# Patient Record
Sex: MALE | Race: WHITE | NOT HISPANIC OR LATINO | URBAN - METROPOLITAN AREA
[De-identification: names, ages, dates, MRNs, and addresses within clinical notes are randomized per-mention and may not be internally consistent; named-entity substitution may affect disease eponyms.]

---

## 2019-02-26 ENCOUNTER — CLINICAL SUPPORT (OUTPATIENT)
Dept: SMOKING CESSATION | Facility: CLINIC | Age: 59
End: 2019-02-26
Payer: COMMERCIAL

## 2019-02-26 DIAGNOSIS — F17.200 NICOTINE DEPENDENCE: Primary | ICD-10-CM

## 2019-02-26 PROCEDURE — 99404 PR PREVENT COUNSEL,INDIV,60 MIN: ICD-10-PCS | Mod: S$GLB,,, | Performed by: GENERAL PRACTICE

## 2019-02-26 PROCEDURE — 99999 PR PBB SHADOW E&M-NEW PATIENT-LVL I: CPT | Mod: PBBFAC,,,

## 2019-02-26 PROCEDURE — 99999 PR PBB SHADOW E&M-NEW PATIENT-LVL I: ICD-10-PCS | Mod: PBBFAC,,,

## 2019-02-26 PROCEDURE — 99404 PREV MED CNSL INDIV APPRX 60: CPT | Mod: S$GLB,,, | Performed by: GENERAL PRACTICE

## 2019-02-26 RX ORDER — IBUPROFEN 200 MG
1 TABLET ORAL DAILY
Qty: 14 PATCH | Refills: 0 | Status: SHIPPED | OUTPATIENT
Start: 2019-02-26 | End: 2019-03-19 | Stop reason: SDUPTHER

## 2019-02-26 RX ORDER — MULTIVITAMIN
1 TABLET ORAL DAILY
COMMUNITY

## 2019-03-12 ENCOUNTER — CLINICAL SUPPORT (OUTPATIENT)
Dept: SMOKING CESSATION | Facility: CLINIC | Age: 59
End: 2019-03-12
Payer: COMMERCIAL

## 2019-03-12 DIAGNOSIS — F17.200 NICOTINE DEPENDENCE: Primary | ICD-10-CM

## 2019-03-12 PROCEDURE — 99401 PREV MED CNSL INDIV APPRX 15: CPT | Mod: S$GLB,,, | Performed by: GENERAL PRACTICE

## 2019-03-12 PROCEDURE — 99401 PR PREVENT COUNSEL,INDIV,15 MIN: ICD-10-PCS | Mod: S$GLB,,, | Performed by: GENERAL PRACTICE

## 2019-03-12 NOTE — PROGRESS NOTES
Individual Follow-Up Form    3/12/2019    Clinical Status of Patient: Outpatient    Length of Service: 15 minutes    Continuing Medication: yes  Patches     Target Symptoms: Withdrawal and medication side effects. The following were  rated moderate (3) to severe (4) on TCRS:  · Moderate (3): desire tobacco, restless  · Severe (4): none    Comments: Spoke with patient by telephone along with his wife Magnolia in regards to their scheduled clinic follow up appointment. She stated that she did not arrange for transportation for today and will need to reschedule for next week. He stated that he was able to go 4-5 days without smoking last week and used the Step 1 nicotine patch with no adverse side effects noted. He stated that he and his wife were feeling stressed due to smoke personal issues and their finances so they each smoked. He stated that he woke up this morning and put on a nicotine patch. He has 8 remaining patches and feels confident that he will be able to refrain from smoking. Discussed coping strategies and healthy eating habits. Encouraged a clinic follow up appointment. Appointment scheduled. The patient denies any abnormal behavioral or mental changes at this time. Will continue to encourage and monitor progress.    Diagnosis: F17.200    Next Visit: 1 week

## 2019-03-19 ENCOUNTER — CLINICAL SUPPORT (OUTPATIENT)
Dept: SMOKING CESSATION | Facility: CLINIC | Age: 59
End: 2019-03-19
Payer: COMMERCIAL

## 2019-03-19 DIAGNOSIS — F17.200 NICOTINE DEPENDENCE: Primary | ICD-10-CM

## 2019-03-19 DIAGNOSIS — F17.200 NICOTINE DEPENDENCE: ICD-10-CM

## 2019-03-19 PROCEDURE — 99999 PR PBB SHADOW E&M-EST. PATIENT-LVL I: CPT | Mod: PBBFAC,,,

## 2019-03-19 PROCEDURE — 99999 PR PBB SHADOW E&M-EST. PATIENT-LVL I: ICD-10-PCS | Mod: PBBFAC,,,

## 2019-03-19 PROCEDURE — 99404 PREV MED CNSL INDIV APPRX 60: CPT | Mod: S$GLB,,, | Performed by: GENERAL PRACTICE

## 2019-03-19 PROCEDURE — 99404 PR PREVENT COUNSEL,INDIV,60 MIN: ICD-10-PCS | Mod: S$GLB,,, | Performed by: GENERAL PRACTICE

## 2019-03-19 RX ORDER — IBUPROFEN 200 MG
1 TABLET ORAL DAILY
Qty: 14 PATCH | Refills: 0 | Status: SHIPPED | OUTPATIENT
Start: 2019-03-19 | End: 2019-09-18 | Stop reason: ALTCHOICE

## 2019-03-19 RX ORDER — DM/P-EPHED/ACETAMINOPH/DOXYLAM 30-7.5/3
2 LIQUID (ML) ORAL
Qty: 72 LOZENGE | Refills: 2 | Status: SHIPPED | OUTPATIENT
Start: 2019-03-19 | End: 2019-04-25 | Stop reason: SDUPTHER

## 2019-03-19 NOTE — PROGRESS NOTES
Individual Follow-Up Form    3/19/2019    Clinical Status of Patient: Outpatient    Length of Service: 60 minutes    Continuing Medication: yes  Patches    Other Medications: nicotine lozenges to use as needed     Target Symptoms: Withdrawal and medication side effects. The following were  rated moderate (3) to severe (4) on TCRS:  · Moderate (3): desire tobacco  · Severe (4): none    Comments: Patient was seen today for a smoking cessation progress update. He is here today with his wife whom is also in the program. He states that he is doing better than his wife. He is able to refrain from smoking but states that when he sees his wife smoke he will have an intense craving that he is unable to overcome. Discussed coping strategies and the use of nicotine lozenges. He verbalized understanding.  He denies any negative thoughts or behavior at this time. Discussed healthy eating habits and physical activities. Will continue to encourage and monitor progress.  Diagnosis: F17.200    Next Visit: 2 weeks

## 2019-04-02 ENCOUNTER — CLINICAL SUPPORT (OUTPATIENT)
Dept: SMOKING CESSATION | Facility: CLINIC | Age: 59
End: 2019-04-02
Payer: COMMERCIAL

## 2019-04-02 DIAGNOSIS — F17.200 NICOTINE DEPENDENCE: Primary | ICD-10-CM

## 2019-04-02 PROCEDURE — 99401 PR PREVENT COUNSEL,INDIV,15 MIN: ICD-10-PCS | Mod: S$GLB,,, | Performed by: GENERAL PRACTICE

## 2019-04-02 PROCEDURE — 99401 PREV MED CNSL INDIV APPRX 15: CPT | Mod: S$GLB,,, | Performed by: GENERAL PRACTICE

## 2019-04-02 NOTE — PROGRESS NOTES
"Individual Follow-Up Form    4/2/2019    Quit Date:3-    Clinical Status of Patient: Outpatient    Length of Service: 15 minutes    Continuing Medication: yes  Nicotine Lozenges     Target Symptoms: Withdrawal and medication side effects. The following were  rated moderate (3) to severe (4) on TCRS:  · Moderate (3): restless, anxious  · Severe (4): none    Comments: Spoke with patient by telephone in regards to his smoking cessation progress. He and his wife were on the phone together for a counseling call by telephone. She stated that they were unable to secure transportation for today's appointment. Both have reached their target quit date. He stated that he had an easier time not smoking because he would keep himself busy or go for a walk when he wanted to smoke. He is using the nicotine lozenges and states that he is only using a "few" each day. He has stopped using the nicotine patches and feels confident that he will remain tobacco free. They both state that they still have remaining cigarettes in the house if it gets too difficult. Reviewed coping strategies and encouraged them to delay before giving in and to get rid of the remaining cigarettes. They verbalized understanding. The patient denies any abnormal behavioral or mental changes at this time. Will continue to encourage and monitor progress.    Diagnosis: F17.200    Next Visit: 2 weeks  "

## 2019-04-25 ENCOUNTER — CLINICAL SUPPORT (OUTPATIENT)
Dept: SMOKING CESSATION | Facility: CLINIC | Age: 59
End: 2019-04-25
Payer: COMMERCIAL

## 2019-04-25 ENCOUNTER — TELEPHONE (OUTPATIENT)
Dept: SMOKING CESSATION | Facility: CLINIC | Age: 59
End: 2019-04-25

## 2019-04-25 DIAGNOSIS — F17.200 NICOTINE DEPENDENCE: Primary | ICD-10-CM

## 2019-04-25 DIAGNOSIS — F17.200 NICOTINE DEPENDENCE: ICD-10-CM

## 2019-04-25 PROCEDURE — 99407 PR TOBACCO USE CESSATION INTENSIVE >10 MINUTES: ICD-10-PCS | Mod: S$GLB,,, | Performed by: GENERAL PRACTICE

## 2019-04-25 PROCEDURE — 99407 BEHAV CHNG SMOKING > 10 MIN: CPT | Mod: S$GLB,,, | Performed by: GENERAL PRACTICE

## 2019-04-25 RX ORDER — DM/P-EPHED/ACETAMINOPH/DOXYLAM 30-7.5/3
2 LIQUID (ML) ORAL
Qty: 270 LOZENGE | Refills: 0 | Status: SHIPPED | OUTPATIENT
Start: 2019-04-25 | End: 2019-09-18 | Stop reason: ALTCHOICE

## 2019-04-25 NOTE — TELEPHONE ENCOUNTER
Attempt to contact patient in regards to his smoking cessation progress. Recorded message left with return contact information.

## 2019-05-16 ENCOUNTER — TELEPHONE (OUTPATIENT)
Dept: SMOKING CESSATION | Facility: CLINIC | Age: 59
End: 2019-05-16

## 2019-05-16 NOTE — TELEPHONE ENCOUNTER
Attempt to contact patient or his wife Magnolia in regards to their smoking cessation appointment. Patient's wife cancelled her appointment online but Shaan did not. Attempt to confirm appointment but had to leave a recorded message with return contact information. Patient did not arrive for his 1:00pm appointment.

## 2019-05-20 ENCOUNTER — CLINICAL SUPPORT (OUTPATIENT)
Dept: SMOKING CESSATION | Facility: CLINIC | Age: 59
End: 2019-05-20
Payer: COMMERCIAL

## 2019-05-20 DIAGNOSIS — F17.200 NICOTINE DEPENDENCE: Primary | ICD-10-CM

## 2019-05-20 PROCEDURE — 99401 PR PREVENT COUNSEL,INDIV,15 MIN: ICD-10-PCS | Mod: S$GLB,,, | Performed by: GENERAL PRACTICE

## 2019-05-20 PROCEDURE — 99401 PREV MED CNSL INDIV APPRX 15: CPT | Mod: S$GLB,,, | Performed by: GENERAL PRACTICE

## 2019-05-20 NOTE — PROGRESS NOTES
"Individual Follow-Up Form    5/20/2019    Quit Date: 5-    Clinical Status of Patient: Outpatient    Length of Service: 15 minutes    Continuing Medication: yes  Nicotine Lozenges     Target Symptoms: Withdrawal and medication side effects. The following were  rated moderate (3) to severe (4) on TCRS:  · Moderate (3): restless  · Severe (4): none    Comments: Spoke with patient by telephone along with his wife Magnolia in regards to their smoking cessation progress and missed clinic appointment. He has not smoked since last Friday and stated that he only took a "few puffs" off of a small cigarette. He continues to use the 2 mg nicotine lozenges as needed. He has been working a few hours each day trying to get more money to be financially comfortable. He states that they cannot spend money on cigarettes or cigars. He denies any negative thoughts or behavior at this time. Will follow up by telephone for the next follow up due to transportation issues. Will continue to encourage and monitor progress.    Diagnosis: F17.200    Next Visit: 1 week  "

## 2019-08-05 ENCOUNTER — TELEPHONE (OUTPATIENT)
Dept: SMOKING CESSATION | Facility: CLINIC | Age: 59
End: 2019-08-05

## 2019-09-18 ENCOUNTER — CLINICAL SUPPORT (OUTPATIENT)
Dept: SMOKING CESSATION | Facility: CLINIC | Age: 59
End: 2019-09-18
Payer: COMMERCIAL

## 2019-09-18 DIAGNOSIS — F17.200 NICOTINE DEPENDENCE: Primary | ICD-10-CM

## 2019-09-18 PROCEDURE — 99407 PR TOBACCO USE CESSATION INTENSIVE >10 MINUTES: ICD-10-PCS | Mod: S$GLB,,, | Performed by: GENERAL PRACTICE

## 2019-09-18 PROCEDURE — 99407 BEHAV CHNG SMOKING > 10 MIN: CPT | Mod: S$GLB,,, | Performed by: GENERAL PRACTICE

## 2019-09-18 RX ORDER — DM/P-EPHED/ACETAMINOPH/DOXYLAM 30-7.5/3
2 LIQUID (ML) ORAL
Qty: 270 LOZENGE | Refills: 0 | Status: SHIPPED | OUTPATIENT
Start: 2019-09-18 | End: 2021-01-27 | Stop reason: ALTCHOICE

## 2021-01-27 ENCOUNTER — CLINICAL SUPPORT (OUTPATIENT)
Dept: SMOKING CESSATION | Facility: CLINIC | Age: 61
End: 2021-01-27
Payer: COMMERCIAL

## 2021-01-27 DIAGNOSIS — F17.200 NICOTINE DEPENDENCE: Primary | ICD-10-CM

## 2021-01-27 PROCEDURE — 99404 PREV MED CNSL INDIV APPRX 60: CPT | Mod: S$GLB,,, | Performed by: GENERAL PRACTICE

## 2021-01-27 PROCEDURE — 99999 PR PBB SHADOW E&M-EST. PATIENT-LVL I: ICD-10-PCS | Mod: PBBFAC,,,

## 2021-01-27 PROCEDURE — 99999 PR PBB SHADOW E&M-EST. PATIENT-LVL I: CPT | Mod: PBBFAC,,,

## 2021-01-27 PROCEDURE — 99404 PR PREVENT COUNSEL,INDIV,60 MIN: ICD-10-PCS | Mod: S$GLB,,, | Performed by: GENERAL PRACTICE

## 2021-01-27 RX ORDER — NICOTINE POLACRILEX 2 MG/1
2 LOZENGE ORAL
Qty: 243 EACH | Refills: 0 | Status: SHIPPED | OUTPATIENT
Start: 2021-01-27

## 2021-01-27 RX ORDER — IBUPROFEN 200 MG
1 TABLET ORAL DAILY
Qty: 14 PATCH | Refills: 1 | Status: SHIPPED | OUTPATIENT
Start: 2021-01-27

## 2021-02-04 ENCOUNTER — TELEPHONE (OUTPATIENT)
Dept: SMOKING CESSATION | Facility: CLINIC | Age: 61
End: 2021-02-04

## 2021-02-15 ENCOUNTER — CLINICAL SUPPORT (OUTPATIENT)
Dept: SMOKING CESSATION | Facility: CLINIC | Age: 61
End: 2021-02-15
Payer: COMMERCIAL

## 2021-02-15 DIAGNOSIS — F17.200 NICOTINE DEPENDENCE: Primary | ICD-10-CM

## 2021-02-15 PROCEDURE — 99402 PR PREVENT COUNSEL,INDIV,30 MIN: ICD-10-PCS | Mod: S$GLB,,, | Performed by: GENERAL PRACTICE

## 2021-02-15 PROCEDURE — 99402 PREV MED CNSL INDIV APPRX 30: CPT | Mod: S$GLB,,, | Performed by: GENERAL PRACTICE

## 2021-05-12 ENCOUNTER — CLINICAL SUPPORT (OUTPATIENT)
Dept: SMOKING CESSATION | Facility: CLINIC | Age: 61
End: 2021-05-12
Payer: COMMERCIAL

## 2021-05-12 DIAGNOSIS — F17.200 NICOTINE DEPENDENCE: Primary | ICD-10-CM

## 2021-05-12 PROCEDURE — 99407 BEHAV CHNG SMOKING > 10 MIN: CPT | Mod: S$GLB,,,

## 2021-05-12 PROCEDURE — 99407 PR TOBACCO USE CESSATION INTENSIVE >10 MINUTES: ICD-10-PCS | Mod: S$GLB,,,

## 2021-08-19 ENCOUNTER — CLINICAL SUPPORT (OUTPATIENT)
Dept: SMOKING CESSATION | Facility: CLINIC | Age: 61
End: 2021-08-19
Payer: COMMERCIAL

## 2021-08-19 DIAGNOSIS — F17.200 NICOTINE DEPENDENCE: Primary | ICD-10-CM

## 2021-08-19 PROCEDURE — 99407 PR TOBACCO USE CESSATION INTENSIVE >10 MINUTES: ICD-10-PCS | Mod: S$GLB,,,

## 2021-08-19 PROCEDURE — 99407 BEHAV CHNG SMOKING > 10 MIN: CPT | Mod: S$GLB,,,

## 2025-05-27 ENCOUNTER — TELEPHONE (OUTPATIENT)
Dept: INTERNAL MEDICINE | Facility: CLINIC | Age: 65
End: 2025-05-27
Payer: MEDICARE

## 2025-05-27 NOTE — TELEPHONE ENCOUNTER
Copied from CRM #[id number]. Topic: Appointments - Appointment Rescheduling  >> May 27, 2025  8:26 AM Krystina wrote:  Type:  Patient Requesting a call back     Who Called: Binta   What is the call back request regarding?:caller states that pt is needing to reschedule the appt for today 5/27/25. The next option was 6/2/25 but that did not work and neither does the third option which was 6/27/25. Caller would like to speak to a nurse   Would the patient rather a call back or a response via [facility]?call  Best Call Back Number:[phone]       Additional Information:

## 2025-05-27 NOTE — TELEPHONE ENCOUNTER
Called and spoke to pts wife and assisted with rescheduling pts appointment. Advised pts wife that the appointment was added to wait list in case something sooner became available. Verbalized understanding.

## 2025-05-30 ENCOUNTER — HOSPITAL ENCOUNTER (INPATIENT)
Facility: HOSPITAL | Age: 65
LOS: 5 days | Discharge: HOME | DRG: 193 | End: 2025-06-04
Attending: EMERGENCY MEDICINE | Admitting: HOSPITALIST
Payer: MEDICARE

## 2025-05-30 DIAGNOSIS — R07.9 CHEST PAIN: ICD-10-CM

## 2025-05-30 DIAGNOSIS — R06.02 SOB (SHORTNESS OF BREATH): ICD-10-CM

## 2025-05-30 DIAGNOSIS — R79.89 ELEVATED BRAIN NATRIURETIC PEPTIDE (BNP) LEVEL: ICD-10-CM

## 2025-05-30 DIAGNOSIS — R04.2 HEMOPTYSIS: ICD-10-CM

## 2025-05-30 DIAGNOSIS — J18.9 PNEUMONIA OF LEFT LOWER LOBE DUE TO INFECTIOUS ORGANISM: ICD-10-CM

## 2025-05-30 DIAGNOSIS — R06.02 SHORTNESS OF BREATH: ICD-10-CM

## 2025-05-30 DIAGNOSIS — D64.9 SYMPTOMATIC ANEMIA: Primary | ICD-10-CM

## 2025-05-30 LAB
ABO + RH BLD: NORMAL
ABO + RH BLD: NORMAL
ABORH RETYPE: NORMAL
ABSOLUTE EOSINOPHIL (OHS): 0.16 K/UL
ABSOLUTE MONOCYTE (OHS): 0.41 K/UL (ref 0.3–1)
ABSOLUTE NEUTROPHIL COUNT (OHS): 5.03 K/UL (ref 1.8–7.7)
ALBUMIN SERPL BCP-MCNC: 3.3 G/DL (ref 3.5–5.2)
ALP SERPL-CCNC: 69 UNIT/L (ref 40–150)
ALT SERPL W/O P-5'-P-CCNC: 11 UNIT/L (ref 10–44)
AMPHET UR QL SCN: NEGATIVE
ANION GAP (OHS): 8 MMOL/L (ref 8–16)
AST SERPL-CCNC: 12 UNIT/L (ref 11–45)
BARBITURATE SCN PRESENT UR: NEGATIVE
BASOPHILS # BLD AUTO: 0.01 K/UL
BASOPHILS NFR BLD AUTO: 0.1 %
BENZODIAZ UR QL SCN: NEGATIVE
BILIRUB SERPL-MCNC: 0.5 MG/DL (ref 0.1–1)
BLD PROD TYP BPU: NORMAL
BLD PROD TYP BPU: NORMAL
BLOOD UNIT EXPIRATION DATE: NORMAL
BLOOD UNIT EXPIRATION DATE: NORMAL
BLOOD UNIT TYPE CODE: 9500
BLOOD UNIT TYPE CODE: 9500
BNP SERPL-MCNC: 210 PG/ML (ref 0–99)
BUN SERPL-MCNC: 22 MG/DL (ref 8–23)
CALCIUM SERPL-MCNC: 8.7 MG/DL (ref 8.7–10.5)
CANNABINOIDS UR QL SCN: ABNORMAL
CHLORIDE SERPL-SCNC: 103 MMOL/L (ref 95–110)
CO2 SERPL-SCNC: 24 MMOL/L (ref 23–29)
COCAINE UR QL SCN: ABNORMAL
CREAT SERPL-MCNC: 0.9 MG/DL (ref 0.5–1.4)
CREAT UR-MCNC: 98.4 MG/DL (ref 23–375)
CROSSMATCH INTERPRETATION: NORMAL
CROSSMATCH INTERPRETATION: NORMAL
DISPENSE STATUS: NORMAL
DISPENSE STATUS: NORMAL
ERYTHROCYTE [DISTWIDTH] IN BLOOD BY AUTOMATED COUNT: 21 % (ref 11.5–14.5)
ETHANOL SERPL-MCNC: <10 MG/DL
GFR SERPLBLD CREATININE-BSD FMLA CKD-EPI: >60 ML/MIN/1.73/M2
GLUCOSE SERPL-MCNC: 97 MG/DL (ref 70–110)
HCT VFR BLD AUTO: 15.6 % (ref 40–54)
HCT VFR BLD AUTO: 17.7 % (ref 40–54)
HCV AB SERPL QL IA: NEGATIVE
HGB BLD-MCNC: 3.9 GM/DL (ref 14–18)
HGB BLD-MCNC: 5 GM/DL (ref 14–18)
HIV 1+2 AB+HIV1 P24 AG SERPL QL IA: NEGATIVE
HOLD SPECIMEN: NORMAL
IMM GRANULOCYTES # BLD AUTO: 0.02 K/UL (ref 0–0.04)
IMM GRANULOCYTES NFR BLD AUTO: 0.3 % (ref 0–0.5)
INDIRECT COOMBS: NORMAL
LYMPHOCYTES # BLD AUTO: 1.11 K/UL (ref 1–4.8)
MCH RBC QN AUTO: 17.3 PG (ref 27–31)
MCHC RBC AUTO-ENTMCNC: 25 G/DL (ref 32–36)
MCV RBC AUTO: 69 FL (ref 82–98)
METHADONE UR QL SCN: NEGATIVE
NUCLEATED RBC (/100WBC) (OHS): 0 /100 WBC
OPIATES UR QL SCN: NEGATIVE
PCP UR QL: NEGATIVE
PLATELET # BLD AUTO: 233 K/UL (ref 150–450)
PMV BLD AUTO: 9.2 FL (ref 9.2–12.9)
POTASSIUM SERPL-SCNC: 3.9 MMOL/L (ref 3.5–5.1)
PROT SERPL-MCNC: 6.2 GM/DL (ref 6–8.4)
RBC # BLD AUTO: 2.25 M/UL (ref 4.6–6.2)
RELATIVE EOSINOPHIL (OHS): 2.4 %
RELATIVE LYMPHOCYTE (OHS): 16.5 % (ref 18–48)
RELATIVE MONOCYTE (OHS): 6.1 % (ref 4–15)
RELATIVE NEUTROPHIL (OHS): 74.6 % (ref 38–73)
RH BLD: NORMAL
SODIUM SERPL-SCNC: 135 MMOL/L (ref 136–145)
SPECIMEN OUTDATE: NORMAL
TROPONIN I SERPL DL<=0.01 NG/ML-MCNC: <0.006 NG/ML
TROPONIN I SERPL DL<=0.01 NG/ML-MCNC: <0.006 NG/ML
UNIT NUMBER: NORMAL
UNIT NUMBER: NORMAL
WBC # BLD AUTO: 6.74 K/UL (ref 3.9–12.7)

## 2025-05-30 PROCEDURE — 82077 ASSAY SPEC XCP UR&BREATH IA: CPT | Performed by: EMERGENCY MEDICINE

## 2025-05-30 PROCEDURE — 80307 DRUG TEST PRSMV CHEM ANLYZR: CPT | Performed by: EMERGENCY MEDICINE

## 2025-05-30 PROCEDURE — 87389 HIV-1 AG W/HIV-1&-2 AB AG IA: CPT | Performed by: EMERGENCY MEDICINE

## 2025-05-30 PROCEDURE — 85025 COMPLETE CBC W/AUTO DIFF WBC: CPT | Performed by: EMERGENCY MEDICINE

## 2025-05-30 PROCEDURE — 93005 ELECTROCARDIOGRAM TRACING: CPT

## 2025-05-30 PROCEDURE — 93010 ELECTROCARDIOGRAM REPORT: CPT | Mod: ,,, | Performed by: INTERNAL MEDICINE

## 2025-05-30 PROCEDURE — 96365 THER/PROPH/DIAG IV INF INIT: CPT

## 2025-05-30 PROCEDURE — 83880 ASSAY OF NATRIURETIC PEPTIDE: CPT | Performed by: EMERGENCY MEDICINE

## 2025-05-30 PROCEDURE — 63600175 PHARM REV CODE 636 W HCPCS: Performed by: EMERGENCY MEDICINE

## 2025-05-30 PROCEDURE — 82040 ASSAY OF SERUM ALBUMIN: CPT | Performed by: EMERGENCY MEDICINE

## 2025-05-30 PROCEDURE — 36430 TRANSFUSION BLD/BLD COMPNT: CPT

## 2025-05-30 PROCEDURE — 11000001 HC ACUTE MED/SURG PRIVATE ROOM

## 2025-05-30 PROCEDURE — 25000003 PHARM REV CODE 250: Performed by: EMERGENCY MEDICINE

## 2025-05-30 PROCEDURE — 85018 HEMOGLOBIN: CPT | Performed by: NURSE PRACTITIONER

## 2025-05-30 PROCEDURE — 87040 BLOOD CULTURE FOR BACTERIA: CPT | Performed by: EMERGENCY MEDICINE

## 2025-05-30 PROCEDURE — P9016 RBC LEUKOCYTES REDUCED: HCPCS | Performed by: EMERGENCY MEDICINE

## 2025-05-30 PROCEDURE — 30233N1 TRANSFUSION OF NONAUTOLOGOUS RED BLOOD CELLS INTO PERIPHERAL VEIN, PERCUTANEOUS APPROACH: ICD-10-PCS | Performed by: EMERGENCY MEDICINE

## 2025-05-30 PROCEDURE — 86803 HEPATITIS C AB TEST: CPT | Performed by: EMERGENCY MEDICINE

## 2025-05-30 PROCEDURE — 99285 EMERGENCY DEPT VISIT HI MDM: CPT | Mod: 25

## 2025-05-30 PROCEDURE — 86920 COMPATIBILITY TEST SPIN: CPT | Performed by: EMERGENCY MEDICINE

## 2025-05-30 PROCEDURE — 25500020 PHARM REV CODE 255: Performed by: EMERGENCY MEDICINE

## 2025-05-30 PROCEDURE — 84484 ASSAY OF TROPONIN QUANT: CPT | Performed by: EMERGENCY MEDICINE

## 2025-05-30 PROCEDURE — 86850 RBC ANTIBODY SCREEN: CPT | Performed by: EMERGENCY MEDICINE

## 2025-05-30 PROCEDURE — 96361 HYDRATE IV INFUSION ADD-ON: CPT

## 2025-05-30 PROCEDURE — 85014 HEMATOCRIT: CPT | Performed by: NURSE PRACTITIONER

## 2025-05-30 RX ORDER — ACETAMINOPHEN 500 MG
1000 TABLET ORAL
Status: ACTIVE | OUTPATIENT
Start: 2025-05-30 | End: 2025-05-31

## 2025-05-30 RX ORDER — ASPIRIN 325 MG
325 TABLET ORAL
Status: DISCONTINUED | OUTPATIENT
Start: 2025-05-30 | End: 2025-05-31

## 2025-05-30 RX ORDER — HYDROCODONE BITARTRATE AND ACETAMINOPHEN 500; 5 MG/1; MG/1
TABLET ORAL
Status: DISCONTINUED | OUTPATIENT
Start: 2025-05-30 | End: 2025-06-04 | Stop reason: HOSPADM

## 2025-05-30 RX ADMIN — IOHEXOL 100 ML: 350 INJECTION, SOLUTION INTRAVENOUS at 10:05

## 2025-05-30 RX ADMIN — PIPERACILLIN AND TAZOBACTAM 4.5 G: 4; .5 INJECTION, POWDER, LYOPHILIZED, FOR SOLUTION INTRAVENOUS at 09:05

## 2025-05-30 RX ADMIN — SODIUM CHLORIDE 500 ML: 9 INJECTION, SOLUTION INTRAVENOUS at 07:05

## 2025-05-30 NOTE — ED PROVIDER NOTES
"SCRIBE #1 NOTE: I, Irma Viviana, am scribing for, and in the presence of, Cody Jacobo Jr., MD. I have scribed the entire note.       History     Chief Complaint   Patient presents with    Shortness of Breath     Pt brought in by EMS for SOB, worse on exertion, with blood tinged sputum x 3 months. Pt also reports dizziness when standing over the same time. Pt reports he has been unable to get to a doctor. -chest pain     Review of patient's allergies indicates:   Allergen Reactions    Chantix [varenicline]      Seizures. Pt states that he can't remember if it was Chantix or Wellbutrin that he had seizures with.    Wellbutrin [bupropion hcl]      seizures         History of Present Illness     HPI    5/30/2025, 6:45 PM  History obtained from the medical records and patient      History of Present Illness: Shaan Tucker is a 64 y.o. male patient with a PMHx of seizures and fetal alcohol syndrome who presents to the Emergency Department for evaluation of coughing bloody tinged sputum which onset approximately "6 months ago." Pt denies being evaluated for sxs. Pt denies any surgeries or blood transfusions. Pt denies taking any home medications. Symptoms are constant and moderate in severity. No mitigating or exacerbating factors reported. Associated sxs include increased SOB with minimal exertion and fatigue. Patient denies any abdominal pain, fever, chest pain, and all other sxs at this time. No prior Tx provided. No further complaints or concerns at this time.       Arrival mode: Ambulance Service     PCP: Karla Dennis FNP        Past Medical History:  Past Medical History:   Diagnosis Date    ADHD (attention deficit hyperactivity disorder)     Autism     Fetal alcohol syndrome     Seizures        Past Surgical History:  Past Surgical History:   Procedure Laterality Date    CYST REMOVAL           Family History:  No family history on file.    Social History:  Social History     Tobacco Use    Smoking status: " Former     Current packs/day: 0.00     Average packs/day: 1 pack/day for 33.0 years (33.0 ttl pk-yrs)     Types: Cigarettes, Cigars     Start date: 1988     Quit date: 2021     Years since quittin.3    Smokeless tobacco: Never    Tobacco comments:     cigarillos   Substance and Sexual Activity    Alcohol use: Not on file    Drug use: Not on file    Sexual activity: Not on file        Review of Systems     Review of Systems   Constitutional:  Positive for fatigue. Negative for fever.   HENT:  Negative for sore throat.    Respiratory:  Positive for cough (Bloody tinged sputum) and shortness of breath.    Cardiovascular:  Negative for chest pain.   Gastrointestinal:  Negative for nausea.   Genitourinary:  Negative for dysuria.   Musculoskeletal:  Negative for back pain.   Skin:  Negative for rash.   Neurological:  Negative for weakness.   Hematological:  Does not bruise/bleed easily.   All other systems reviewed and are negative.     Physical Exam     Initial Vitals [25 1627]   BP Pulse Resp Temp SpO2   (!) 107/52 73 18 98.2 °F (36.8 °C) 99 %      MAP       --          Physical Exam  Nursing Notes and Vital Signs Reviewed.  Constitutional: Patient is in no acute distress. Frail. Pale.  Head: Atraumatic. Normocephalic.  Eyes: PERRL. EOM intact. Conjunctivae are not pale. No scleral icterus.  ENT: Mucous membranes are dry. Oropharynx is clear and symmetric. Coughing blood tinged sputum.  Neck: Supple. Full ROM. No lymphadenopathy.  Cardiovascular: Regular rate. Regular rhythm. No murmurs, rubs, or gallops. Distal pulses are 2+ and symmetric.  Pulmonary/Chest: No respiratory distress. Left lung crackles.  Abdominal: Soft and non-distended.  There is no tenderness.  No rebound, guarding, or rigidity. Good bowel sounds.  Genitourinary: No CVA tenderness  Musculoskeletal: Moves all extremities. No obvious deformities. No edema. No calf tenderness.  Skin: Warm and dry.   Neurological:  Alert, awake, and  appropriate.  Normal speech.  No acute focal neurological deficits are appreciated. GS15  Psychiatric: Normal affect. Good eye contact. Appropriate in content.     ED Course   Critical Care    Date/Time: 5/30/2025 11:39 PM    Performed by: Cody Jacobo Jr., MD  Authorized by: Cody Jacobo Jr., MD  Direct patient critical care time: 21 minutes  Additional history critical care time: 19 minutes  Ordering / reviewing critical care time: 22 minutes  Documentation critical care time: 8 minutes  Consulting other physicians critical care time: 5 minutes  Total critical care time (exclusive of procedural time) : 75 minutes  Critical care time was exclusive of separately billable procedures and treating other patients and teaching time.  Critical care was necessary to treat or prevent imminent or life-threatening deterioration of the following conditions: Symptomatic anemia.  Critical care was time spent personally by me on the following activities: blood draw for specimens, development of treatment plan with patient or surrogate, interpretation of cardiac output measurements, evaluation of patient's response to treatment, examination of patient, obtaining history from patient or surrogate, ordering and performing treatments and interventions, pulse oximetry, ordering and review of radiographic studies, ordering and review of laboratory studies, re-evaluation of patient's condition and discussions with consultants.        ED Vital Signs:  Vitals:    05/30/25 1914 05/30/25 1932 05/30/25 1940 05/30/25 1945   BP: (!) 110/59 115/61 115/65 124/68   Pulse: 65 65 66 66   Resp: 17 17 18 16   Temp: 97.9 °F (36.6 °C)  97.9 °F (36.6 °C) 98 °F (36.7 °C)   TempSrc: Oral  Oral    SpO2: 100% 98% 100% 100%   Weight:       Height:        05/30/25 2003 05/30/25 2004 05/30/25 2014 05/30/25 2130   BP: 127/71  127/72 126/76   Pulse: 67  66 66   Resp: 16  17 18   Temp:   97.8 °F (36.6 °C) 98.4 °F (36.9 °C)   TempSrc:   Oral    SpO2: 100%  99%  "100%   Weight:  63.5 kg (140 lb)     Height:  5' 10" (1.778 m)      05/30/25 2147 05/30/25 2203 05/30/25 2232 05/30/25 2333   BP: 127/72 130/78 (!) 115/55 119/65   Pulse: 67 70 72 70   Resp: 19  19 20   Temp: 98.2 °F (36.8 °C)   98 °F (36.7 °C)   TempSrc: Oral   Oral   SpO2: 99% 98% 98% 98%   Weight:       Height:        05/31/25 0003 05/31/25 0040 05/31/25 0157   BP: 124/64 124/68    Pulse: 71 66 62   Resp:  20    Temp:  98.3 °F (36.8 °C)    TempSrc:  Oral    SpO2: 97% 98%    Weight:  59.3 kg (130 lb 11.7 oz)    Height:  5' 9" (1.753 m)        Abnormal Lab Results:  Labs Reviewed   COMPREHENSIVE METABOLIC PANEL - Abnormal       Result Value    Sodium 135 (*)     Potassium 3.9      Chloride 103      CO2 24      Glucose 97      BUN 22      Creatinine 0.9      Calcium 8.7      Protein Total 6.2      Albumin 3.3 (*)     Bilirubin Total 0.5      ALP 69      AST 12      ALT 11      Anion Gap 8      eGFR >60     B-TYPE NATRIURETIC PEPTIDE - Abnormal     (*)    CBC WITH DIFFERENTIAL - Abnormal    WBC 6.74      RBC 2.25 (*)     HGB 3.9 (*)     HCT 15.6 (*)     MCV 69 (*)     MCH 17.3 (*)     MCHC 25.0 (*)     RDW 21.0 (*)     Platelet Count 233      MPV 9.2      Nucleated RBC 0      Neut % 74.6 (*)     Lymph % 16.5 (*)     Mono % 6.1      Eos % 2.4      Basophil % 0.1      Imm Grans % 0.3      Neut # 5.03      Lymph # 1.11      Mono # 0.41      Eos # 0.16      Baso # 0.01      Imm Grans # 0.02     DRUG SCREEN PANEL, URINE EMERGENCY - Abnormal    Benzodiazepine, Urine Negative      Methadone, Urine Negative      Cocaine, Urine Presumptive Positive (*)     Opiates, Urine Negative      Barbiturates, Urine Negative      Amphetamines, Urine Negative      THC Presumptive Positive (*)     Phencyclidine, Urine Negative      Urine Creatinine 98.4      Narrative:     This screen includes the following classes of drugs at the listed cut-off:     Benzodiazepines:        200 ng/ml   Methadone:              300 ng/ml   Cocaine " "metabolite:     300 ng/ml   Opiates:                300 ng/ml   Barbiturates:           200 ng/ml   Amphetamines:           1000 ng/ml   Marijuana metabs (THC): 50 ng/ml   Phencyclidine (PCP):    25 ng/ml     This is a screening test. If results do not correlate with clinical presentation, then a confirmatory send out test is advised.    This report is intended for use in clinical monitoring and management of patients. It is not intended for use in employment related drug testing."   HEMOGLOBIN - Abnormal    HGB 5.0 (*)    HEMATOCRIT - Abnormal    HCT 17.7 (*)    HEPATITIS C ANTIBODY - Normal    Hep C Ab Interp Negative     HIV 1 / 2 ANTIBODY - Normal    HIV 1/2 Ag/Ab Negative     TROPONIN I - Normal    Troponin-I <0.006     TROPONIN I - Normal    Troponin-I <0.006     ALCOHOL,MEDICAL (ETHANOL) - Normal    Alcohol, Serum <10     CULTURE, BLOOD   CULTURE, BLOOD   HEP C VIRUS HOLD SPECIMEN    Extra Tube Hold for add-ons.     CBC W/ AUTO DIFFERENTIAL    Narrative:     The following orders were created for panel order CBC auto differential.  Procedure                               Abnormality         Status                     ---------                               -----------         ------                     CBC with Differential[[id number]]       Abnormal            Final result                 Please view results for these tests on the individual orders.   TYPE & SCREEN    Specimen Outdate 06/02/2025 23:59      Group & Rh A POS      Indirect Tianna NEG     ABORH RETYPE    ABORH Retype A POS     PREPARE RBC SOFT    UNIT NUMBER [id number]      UNIT ABO/RH A POS      DISPENSE STATUS Transfused      Unit Expiration [id number]      Product Code C2705A40      Unit Blood Type Code 6200      CROSSMATCH INTERPRETATION Compatible      UNIT NUMBER [id number]      UNIT ABO/RH A POS      DISPENSE STATUS Selected      Unit Expiration [id number]      Product Code C0148C18      Unit Blood Type Code 6200      CROSSMATCH " INTERPRETATION Compatible     PREPARE RBC SOFT    UNIT NUMBER [id number]      UNIT ABO/RH O NEG      DISPENSE STATUS Transfused      Unit Expiration [id number]      Product Code W8297H34      Unit Blood Type Code 9500      CROSSMATCH INTERPRETATION Compatible      UNIT NUMBER [id number]      UNIT ABO/RH O NEG      DISPENSE STATUS Transfused      Unit Expiration [id number]      Product Code X8283O45      Unit Blood Type Code 9500      CROSSMATCH INTERPRETATION Compatible          All Lab Results:  Results for orders placed or performed during the hospital encounter of 05/30/25   Hepatitis C Antibody    Collection Time: 05/30/25  5:47 PM   Result Value Ref Range    Hep C Ab Interp Negative Negative   HCV Virus Hold Specimen    Collection Time: 05/30/25  5:47 PM   Result Value Ref Range    Extra Tube Hold for add-ons.    HIV 1/2 Ag/Ab (4th Gen)    Collection Time: 05/30/25  5:47 PM   Result Value Ref Range    HIV 1/2 Ag/Ab Negative Negative   Comprehensive metabolic panel    Collection Time: 05/30/25  5:47 PM   Result Value Ref Range    Sodium 135 (L) 136 - 145 mmol/L    Potassium 3.9 3.5 - 5.1 mmol/L    Chloride 103 95 - 110 mmol/L    CO2 24 23 - 29 mmol/L    Glucose 97 70 - 110 mg/dL    BUN 22 8 - 23 mg/dL    Creatinine 0.9 0.5 - 1.4 mg/dL    Calcium 8.7 8.7 - 10.5 mg/dL    Protein Total 6.2 6.0 - 8.4 gm/dL    Albumin 3.3 (L) 3.5 - 5.2 g/dL    Bilirubin Total 0.5 0.1 - 1.0 mg/dL    ALP 69 40 - 150 unit/L    AST 12 11 - 45 unit/L    ALT 11 10 - 44 unit/L    Anion Gap 8 8 - 16 mmol/L    eGFR >60 >60 mL/min/1.73/m2   Troponin I #1    Collection Time: 05/30/25  5:47 PM   Result Value Ref Range    Troponin-I <0.006 <=0.026 ng/mL   BNP    Collection Time: 05/30/25  5:47 PM   Result Value Ref Range     (H) 0 - 99 pg/mL   ABORH RETYPE    Collection Time: 05/30/25  5:47 PM   Result Value Ref Range    ABORH Retype A POS    CBC with Differential    Collection Time: 05/30/25  6:38 PM   Result Value Ref Range     WBC 6.74 3.90 - 12.70 K/uL    RBC 2.25 (L) 4.60 - 6.20 M/uL    HGB 3.9 (LL) 14.0 - 18.0 gm/dL    HCT 15.6 (LL) 40.0 - 54.0 %    MCV 69 (L) 82 - 98 fL    MCH 17.3 (L) 27.0 - 31.0 pg    MCHC 25.0 (L) 32.0 - 36.0 g/dL    RDW 21.0 (H) 11.5 - 14.5 %    Platelet Count 233 150 - 450 K/uL    MPV 9.2 9.2 - 12.9 fL    Nucleated RBC 0 <=0 /100 WBC    Neut % 74.6 (H) 38 - 73 %    Lymph % 16.5 (L) 18 - 48 %    Mono % 6.1 4 - 15 %    Eos % 2.4 <=8 %    Basophil % 0.1 <=1.9 %    Imm Grans % 0.3 0.0 - 0.5 %    Neut # 5.03 1.8 - 7.7 K/uL    Lymph # 1.11 1 - 4.8 K/uL    Mono # 0.41 0.3 - 1 K/uL    Eos # 0.16 <=0.5 K/uL    Baso # 0.01 <=0.2 K/uL    Imm Grans # 0.02 0.00 - 0.04 K/uL   Ethanol    Collection Time: 05/30/25  6:38 PM   Result Value Ref Range    Alcohol, Serum <10 <10 mg/dL   Type & Screen    Collection Time: 05/30/25  6:38 PM   Result Value Ref Range    Specimen Outdate 06/02/2025 23:59     Group & Rh A POS     Indirect Tianna NEG    Prepare RBC 2 Units; hb: 4    Collection Time: 05/30/25  6:38 PM   Result Value Ref Range    UNIT NUMBER [id number]     UNIT ABO/RH A POS     DISPENSE STATUS Transfused     Unit Expiration [id number]     Product Code S1704B41     Unit Blood Type Code 6200     CROSSMATCH INTERPRETATION Compatible     UNIT NUMBER [id number]     UNIT ABO/RH A POS     DISPENSE STATUS Selected     Unit Expiration [id number]     Product Code H1199D46     Unit Blood Type Code 6200     CROSSMATCH INTERPRETATION Compatible    Prepare RBC 2 Units; Emergency    Collection Time: 05/30/25  6:38 PM   Result Value Ref Range    UNIT NUMBER [id number]     UNIT ABO/RH O NEG     DISPENSE STATUS Transfused     Unit Expiration [id number]     Product Code S5433E64     Unit Blood Type Code 9500     CROSSMATCH INTERPRETATION Compatible     UNIT NUMBER [id number]     UNIT ABO/RH O NEG     DISPENSE STATUS Transfused     Unit Expiration [id number]     Product Code O1081Z50     Unit Blood Type Code 9500     CROSSMATCH  INTERPRETATION Compatible    Troponin I #2    Collection Time: 05/30/25  8:42 PM   Result Value Ref Range    Troponin-I <0.006 <=0.026 ng/mL   Hemoglobin    Collection Time: 05/30/25  9:16 PM   Result Value Ref Range    HGB 5.0 (LL) 14.0 - 18.0 gm/dL   Hematocrit    Collection Time: 05/30/25  9:16 PM   Result Value Ref Range    HCT 17.7 (LL) 40.0 - 54.0 %   Drug screen panel, in-house    Collection Time: 05/30/25  9:35 PM   Result Value Ref Range    Benzodiazepine, Urine Negative Negative    Methadone, Urine Negative Negative    Cocaine, Urine Presumptive Positive (A) Negative    Opiates, Urine Negative Negative    Barbiturates, Urine Negative Negative    Amphetamines, Urine Negative Negative    THC Presumptive Positive (A) Negative    Phencyclidine, Urine Negative Negative    Urine Creatinine 98.4 23.0 - 375.0 mg/dL       Imaging Results:  Imaging Results              CT Chest With Contrast (In process)                      X-Ray Chest AP Portable (Final result)  Result time 05/30/25 18:19:29      Final result by Jluis Wick Jr., MD (05/30/25 18:19:29)                   Impression:      1.  Pneumonia.    Finalized on: 5/30/2025 6:19 PM By:  Jluis Wick MD  [facility]# [id number]      2025-05-30 18:21:33.995     [facility]               Narrative:    EXAM: XR CHEST AP PORTABLE    CLINICAL INDICATION:     Shortness of breath.    TECHNIQUE/FINDINGS: Left lower lobe and left midlung consolidation with small effusion.  Right lung is clear.  Multiple granulomas.  Cardiac silhouette and mediastinum within normal limits.                                    Type of Interpretation: Outside Written Report  STAT Radiology Procedure Done: CT chest with intravenous contrast  Interpretation: Findings are compatible with infectious pneumonia within the left lung  Radiologist:  Florentin Wilson MD  05/30/2025  23:03    The EKG was ordered, reviewed, and independently interpreted by the ED provider.  Interpretation time: 16:57  Rate: 67  BPM  Rhythm: normal sinus rhythm  Interpretation: No acute ST changes. No STEMI.           The Emergency Provider reviewed the vital signs and test results, which are outlined above.     ED Discussion     9:12 PM: Discussed pt's case with FRAN Selby (Critical care medicine) who recommends STAT h/h and admission to the floor since VSS and no active hemoptysis.    9:46 PM: Discussed case with Maco Joya MD ([facility]). Dr. Joya agrees with current care and management of pt and accepts admission. Dr. Joya requests a CT Chest scan with contrast.  Admitting Service: [facility]  Admitting Physician: Dr. Joya  Admit to: med/tele inpt    9:46 PM: Re-evaluated pt. I have discussed test results, shared treatment plan, and the need for admission with patient/family/caretaker at bedside. Pt and/or family/caretaker express understanding at this time and agree with all information. All questions answered. Pt/caretaker/family member(s) have no further questions or concerns at this time. Pt is ready for admit.    65 yo M c/o fatigue, sob, and coughing up pink sputum for 6 months, worsening fatigue today.  hb: 3.9, wbc: 6.7, plt: 233, cr: 0.9, bnp: 210, trop: <0.006, EKG: nsr, no stemi. 67bpm. cxr: LLL pneumonia.  pt given 2u emergent prbc and I ordered 2 more units of prbc for him as well as zosyn for this pneumonia.  Repeat hb: 5 after 2u emergent prbc.  pt satting well at 99% on RA.  admission for symptomatic anemia and pneumonia.         Medical Decision Making  Amount and/or Complexity of Data Reviewed  Labs: ordered. Decision-making details documented in ED Course.  Radiology: ordered and independent interpretation performed. Decision-making details documented in ED Course.  ECG/medicine tests: ordered and independent interpretation performed. Decision-making details documented in ED Course.    Risk  OTC drugs.  Prescription drug management.  Parenteral controlled  substances.  Decision regarding hospitalization.  Risk Details: OTC drugs, prescription drugs and controlled substances considered.  Due to patient's symptoms improving and pain controlled pain medications ordered appropriately.  Differential diagnoses:  Pneumonia, Congestive heart failure, Acute Myocardial infarction, Pleural effusion, Pulmonary edema, Pulmonary embolism, Electrolyte imbalance, Infectious etiology, COPD exacerbation, Asthma exacerbation, Pneumothorax,  Cardiac tamponade, Anemia, Deconditioning, bronchospasm, upper airway obstruction such: Aspiration, Foreign body among others.      Critical Care  Total time providing critical care: 75 minutes                ED Medication(s):  Medications   aspirin tablet 325 mg (325 mg Oral Not Given 5/30/25 1930)   0.9%  NaCl infusion (for blood administration) (has no administration in time range)   acetaminophen tablet 1,000 mg (1,000 mg Oral Not Given 5/30/25 1845)   0.9%  NaCl infusion (for blood administration) (has no administration in time range)   sodium chloride 0.9% flush 10 mL (has no administration in time range)   albuterol-ipratropium 2.5 mg-0.5 mg/3 mL nebulizer solution 3 mL (has no administration in time range)   ondansetron injection 4 mg (has no administration in time range)   acetaminophen suppository 650 mg (has no administration in time range)   morphine injection 2 mg (has no administration in time range)   naloxone 0.4 mg/mL injection 0.02 mg (has no administration in time range)   glucose chewable tablet 16 g (has no administration in time range)   glucose chewable tablet 24 g (has no administration in time range)   dextrose 50% injection 12.5 g (has no administration in time range)   dextrose 50% injection 25 g (has no administration in time range)   glucagon (human recombinant) injection 1 mg (has no administration in time range)   sodium chloride 0.9% bolus 500 mL 500 mL (0 mLs Intravenous Stopped 5/30/25 2018)   piperacillin-tazobactam  (ZOSYN) 4.5 g in D5W 100 mL IVPB (MB+) (0 g Intravenous Stopped 5/30/25 2137)   iohexoL (OMNIPAQUE 350) injection 100 mL (100 mLs Intravenous Given 5/30/25 2247)       Current Discharge Medication List                  Scribe Attestation:   Scribe #1: I performed the above scribed service and the documentation accurately describes the services I performed. I attest to the accuracy of the note.     Attending:   Physician Attestation Statement for Scribe #1: I, Cody Jacobo Jr., MD, personally performed the services described in this documentation, as scribed by Irma Michelle, in my presence, and it is both accurate and complete.           Clinical Impression       ICD-10-CM ICD-9-CM   1. Symptomatic anemia  D64.9 285.9   2. Shortness of breath  R06.02 786.05   3. SOB (shortness of breath)  R06.02 786.05   4. Pneumonia of left lower lobe due to infectious organism  J18.9 486   5. Chest pain  R07.9 786.50       Disposition:   Disposition: Admitted  Condition: Cody Melo Jr., MD  05/31/25 0108

## 2025-05-31 PROBLEM — J18.9 PNEUMONIA: Status: ACTIVE | Noted: 2025-05-31

## 2025-05-31 PROBLEM — R04.2 HEMOPTYSIS: Status: ACTIVE | Noted: 2025-05-31

## 2025-05-31 PROBLEM — D64.9 SYMPTOMATIC ANEMIA: Status: ACTIVE | Noted: 2025-05-31

## 2025-05-31 LAB
ABO + RH BLD: NORMAL
ABO + RH BLD: NORMAL
ABSOLUTE EOSINOPHIL (OHS): 0.23 K/UL
ABSOLUTE MONOCYTE (OHS): 0.49 K/UL (ref 0.3–1)
ABSOLUTE NEUTROPHIL COUNT (OHS): 4.61 K/UL (ref 1.8–7.7)
ALBUMIN SERPL BCP-MCNC: 3 G/DL (ref 3.5–5.2)
ALP SERPL-CCNC: 63 UNIT/L (ref 40–150)
ALT SERPL W/O P-5'-P-CCNC: 7 UNIT/L (ref 10–44)
ANION GAP (OHS): 6 MMOL/L (ref 8–16)
AORTIC ROOT ANNULUS: 3.7 CM
AORTIC SIZE INDEX: 1.7 CM/M2
ASCENDING AORTA: 3.1 CM
AST SERPL-CCNC: 13 UNIT/L (ref 11–45)
AV INDEX (PROSTH): 0.81
AV MEAN GRADIENT: 5 MMHG
AV PEAK GRADIENT: 7 MMHG
AV REGURGITATION PRESSURE HALF TIME: 901 MS
AV VALVE AREA BY VELOCITY RATIO: 3.8 CM²
AV VALVE AREA: 3.9 CM²
AV VELOCITY RATIO: 0.77
BASOPHILS # BLD AUTO: 0.02 K/UL
BASOPHILS NFR BLD AUTO: 0.3 %
BILIRUB SERPL-MCNC: 1 MG/DL (ref 0.1–1)
BLD PROD TYP BPU: NORMAL
BLD PROD TYP BPU: NORMAL
BLOOD UNIT EXPIRATION DATE: NORMAL
BLOOD UNIT EXPIRATION DATE: NORMAL
BLOOD UNIT TYPE CODE: 6200
BLOOD UNIT TYPE CODE: 6200
BSA FOR ECHO PROCEDURE: 1.76 M2
BUN SERPL-MCNC: 16 MG/DL (ref 8–23)
CALCIUM SERPL-MCNC: 8.2 MG/DL (ref 8.7–10.5)
CHLORIDE SERPL-SCNC: 109 MMOL/L (ref 95–110)
CO2 SERPL-SCNC: 22 MMOL/L (ref 23–29)
CREAT SERPL-MCNC: 0.8 MG/DL (ref 0.5–1.4)
CROSSMATCH INTERPRETATION: NORMAL
CROSSMATCH INTERPRETATION: NORMAL
CV ECHO LV RWT: 0.27 CM
DISPENSE STATUS: NORMAL
DISPENSE STATUS: NORMAL
DOP CALC AO PEAK VEL: 1.3 M/S
DOP CALC AO VTI: 31.8 CM
DOP CALC LVOT AREA: 4.9 CM2
DOP CALC LVOT DIAMETER: 2.5 CM
DOP CALC LVOT PEAK VEL: 1 M/S
DOP CALC LVOT STROKE VOLUME: 125.6 CM3
DOP CALC RVOT PEAK VEL: 0.65 M/S
DOP CALC RVOT VTI: 18 CM
DOP CALCLVOT PEAK VEL VTI: 25.6 CM
E WAVE DECELERATION TIME: 163 MSEC
E/A RATIO: 1.07
E/E' RATIO: 12 M/S
ECHO LV POSTERIOR WALL: 0.8 CM (ref 0.6–1.1)
EJECTION FRACTION: 50 %
ERYTHROCYTE [DISTWIDTH] IN BLOOD BY AUTOMATED COUNT: 21.5 % (ref 11.5–14.5)
FRACTIONAL SHORTENING: 28.8 % (ref 28–44)
GFR SERPLBLD CREATININE-BSD FMLA CKD-EPI: >60 ML/MIN/1.73/M2
GLUCOSE SERPL-MCNC: 76 MG/DL (ref 70–110)
HCT VFR BLD AUTO: 23.9 % (ref 40–54)
HGB BLD-MCNC: 7 GM/DL (ref 14–18)
IMM GRANULOCYTES # BLD AUTO: 0.02 K/UL (ref 0–0.04)
IMM GRANULOCYTES NFR BLD AUTO: 0.3 % (ref 0–0.5)
INTERVENTRICULAR SEPTUM: 0.8 CM (ref 0.6–1.1)
IVC DIAMETER: 2.66 CM
IVRT: 100 MSEC
LA MAJOR: 6.9 CM
LA MINOR: 6.2 CM
LA WIDTH: 4.6 CM
LEFT ATRIUM AREA SYSTOLIC (APICAL 2 CHAMBER): 29.21 CM2
LEFT ATRIUM AREA SYSTOLIC (APICAL 4 CHAMBER): 25.52 CM2
LEFT ATRIUM SIZE: 4.1 CM
LEFT ATRIUM VOLUME INDEX MOD: 54 ML/M2
LEFT ATRIUM VOLUME INDEX: 59 ML/M2
LEFT ATRIUM VOLUME MOD: 97 ML
LEFT ATRIUM VOLUME: 105 CM3
LEFT INTERNAL DIMENSION IN SYSTOLE: 4.2 CM (ref 2.1–4)
LEFT VENTRICLE DIASTOLIC VOLUME INDEX: 96.07 ML/M2
LEFT VENTRICLE DIASTOLIC VOLUME: 171 ML
LEFT VENTRICLE END SYSTOLIC VOLUME APICAL 2 CHAMBER: 111.93 ML
LEFT VENTRICLE END SYSTOLIC VOLUME APICAL 4 CHAMBER: 78.85 ML
LEFT VENTRICLE MASS INDEX: 101.5 G/M2
LEFT VENTRICLE SYSTOLIC VOLUME INDEX: 43.3 ML/M2
LEFT VENTRICLE SYSTOLIC VOLUME: 77 ML
LEFT VENTRICULAR INTERNAL DIMENSION IN DIASTOLE: 5.9 CM (ref 3.5–6)
LEFT VENTRICULAR MASS: 180.7 G
LV LATERAL E/E' RATIO: 9.7 M/S
LV SEPTAL E/E' RATIO: 14.5 M/S
LVED V (TEICH): 171.13 ML
LVES V (TEICH): 76.75 ML
LVOT MG: 2.52 MMHG
LVOT MV: 0.74 CM/S
LYMPHOCYTES # BLD AUTO: 1.1 K/UL (ref 1–4.8)
MCH RBC QN AUTO: 21.7 PG (ref 27–31)
MCHC RBC AUTO-ENTMCNC: 29.3 G/DL (ref 32–36)
MCV RBC AUTO: 74 FL (ref 82–98)
MV PEAK A VEL: 0.81 M/S
MV PEAK E VEL: 0.87 M/S
NUCLEATED RBC (/100WBC) (OHS): 0 /100 WBC
OHS CV RV/LV RATIO: 0.51 CM
PISA AR MAX VEL: 4.12 M/S
PISA MRMAX VEL: 5.69 M/S
PISA TR MAX VEL: 3.1 M/S
PLATELET # BLD AUTO: 195 K/UL (ref 150–450)
PMV BLD AUTO: 9.4 FL (ref 9.2–12.9)
POTASSIUM SERPL-SCNC: 4.3 MMOL/L (ref 3.5–5.1)
PROT SERPL-MCNC: 5.8 GM/DL (ref 6–8.4)
PULM VEIN S/D RATIO: 1.75
PV MEAN GRADIENT: 1 MMHG
PV PEAK D VEL: 0.48 M/S
PV PEAK S VEL: 0.84 M/S
RA MAJOR: 5.29 CM
RA PRESSURE ESTIMATED: 3 MMHG
RA WIDTH: 4.8 CM
RBC # BLD AUTO: 3.22 M/UL (ref 4.6–6.2)
RELATIVE EOSINOPHIL (OHS): 3.6 %
RELATIVE LYMPHOCYTE (OHS): 17 % (ref 18–48)
RELATIVE MONOCYTE (OHS): 7.6 % (ref 4–15)
RELATIVE NEUTROPHIL (OHS): 71.2 % (ref 38–73)
RIGHT VENTRICLE DIASTOLIC BASEL DIMENSION: 3 CM
RV TB RVSP: 6 MMHG
RV TISSUE DOPPLER FREE WALL SYSTOLIC VELOCITY 1 (APICAL 4 CHAMBER VIEW): 13.28 CM/S
SODIUM SERPL-SCNC: 137 MMOL/L (ref 136–145)
STJ: 2.7 CM
TDI LATERAL: 0.09 M/S
TDI SEPTAL: 0.06 M/S
TDI: 0.08 M/S
TR MAX PG: 38 MMHG
TRICUSPID ANNULAR PLANE SYSTOLIC EXCURSION: 2 CM
TV REST PULMONARY ARTERY PRESSURE: 41 MMHG
UNIT NUMBER: NORMAL
UNIT NUMBER: NORMAL
WBC # BLD AUTO: 6.47 K/UL (ref 3.9–12.7)
Z-SCORE OF LEFT VENTRICULAR DIMENSION IN END DIASTOLE: 1.82
Z-SCORE OF LEFT VENTRICULAR DIMENSION IN END SYSTOLE: 2.55

## 2025-05-31 PROCEDURE — P9016 RBC LEUKOCYTES REDUCED: HCPCS | Performed by: EMERGENCY MEDICINE

## 2025-05-31 PROCEDURE — 11000001 HC ACUTE MED/SURG PRIVATE ROOM

## 2025-05-31 PROCEDURE — 25000003 PHARM REV CODE 250: Performed by: STUDENT IN AN ORGANIZED HEALTH CARE EDUCATION/TRAINING PROGRAM

## 2025-05-31 PROCEDURE — 99900035 HC TECH TIME PER 15 MIN (STAT)

## 2025-05-31 PROCEDURE — 63600175 PHARM REV CODE 636 W HCPCS: Performed by: STUDENT IN AN ORGANIZED HEALTH CARE EDUCATION/TRAINING PROGRAM

## 2025-05-31 PROCEDURE — 87205 SMEAR GRAM STAIN: CPT | Performed by: INTERNAL MEDICINE

## 2025-05-31 PROCEDURE — 36415 COLL VENOUS BLD VENIPUNCTURE: CPT | Performed by: HOSPITALIST

## 2025-05-31 PROCEDURE — 21400001 HC TELEMETRY ROOM

## 2025-05-31 PROCEDURE — 87205 SMEAR GRAM STAIN: CPT | Performed by: STUDENT IN AN ORGANIZED HEALTH CARE EDUCATION/TRAINING PROGRAM

## 2025-05-31 PROCEDURE — 87015 SPECIMEN INFECT AGNT CONCNTJ: CPT | Performed by: INTERNAL MEDICINE

## 2025-05-31 PROCEDURE — 63600175 PHARM REV CODE 636 W HCPCS: Performed by: INTERNAL MEDICINE

## 2025-05-31 PROCEDURE — 87206 SMEAR FLUORESCENT/ACID STAI: CPT | Performed by: INTERNAL MEDICINE

## 2025-05-31 PROCEDURE — 94799 UNLISTED PULMONARY SVC/PX: CPT | Mod: XB

## 2025-05-31 PROCEDURE — 85025 COMPLETE CBC W/AUTO DIFF WBC: CPT | Performed by: HOSPITALIST

## 2025-05-31 PROCEDURE — 80053 COMPREHEN METABOLIC PANEL: CPT | Performed by: HOSPITALIST

## 2025-05-31 RX ORDER — GLUCAGON 1 MG
1 KIT INJECTION
Status: DISCONTINUED | OUTPATIENT
Start: 2025-05-31 | End: 2025-06-04 | Stop reason: HOSPADM

## 2025-05-31 RX ORDER — AZITHROMYCIN 250 MG/1
250 TABLET, FILM COATED ORAL DAILY
Status: DISCONTINUED | OUTPATIENT
Start: 2025-05-31 | End: 2025-05-31

## 2025-05-31 RX ORDER — METRONIDAZOLE 500 MG/100ML
500 INJECTION, SOLUTION INTRAVENOUS
Status: DISCONTINUED | OUTPATIENT
Start: 2025-05-31 | End: 2025-06-04 | Stop reason: HOSPADM

## 2025-05-31 RX ORDER — SODIUM CHLORIDE 0.9 % (FLUSH) 0.9 %
10 SYRINGE (ML) INJECTION EVERY 12 HOURS PRN
Status: DISCONTINUED | OUTPATIENT
Start: 2025-05-31 | End: 2025-06-04 | Stop reason: HOSPADM

## 2025-05-31 RX ORDER — IBUPROFEN 200 MG
16 TABLET ORAL
Status: DISCONTINUED | OUTPATIENT
Start: 2025-05-31 | End: 2025-06-04 | Stop reason: HOSPADM

## 2025-05-31 RX ORDER — CEFTRIAXONE 1 G/1
1 INJECTION, POWDER, FOR SOLUTION INTRAMUSCULAR; INTRAVENOUS
Status: DISCONTINUED | OUTPATIENT
Start: 2025-05-31 | End: 2025-06-04 | Stop reason: HOSPADM

## 2025-05-31 RX ORDER — MORPHINE SULFATE 2 MG/ML
2 INJECTION, SOLUTION INTRAMUSCULAR; INTRAVENOUS EVERY 4 HOURS PRN
Refills: 0 | Status: DISCONTINUED | OUTPATIENT
Start: 2025-05-31 | End: 2025-06-02

## 2025-05-31 RX ORDER — IPRATROPIUM BROMIDE AND ALBUTEROL SULFATE 2.5; .5 MG/3ML; MG/3ML
3 SOLUTION RESPIRATORY (INHALATION) EVERY 6 HOURS PRN
Status: DISCONTINUED | OUTPATIENT
Start: 2025-05-31 | End: 2025-06-04 | Stop reason: HOSPADM

## 2025-05-31 RX ORDER — NALOXONE HCL 0.4 MG/ML
0.02 VIAL (ML) INJECTION
Status: DISCONTINUED | OUTPATIENT
Start: 2025-05-31 | End: 2025-06-04 | Stop reason: HOSPADM

## 2025-05-31 RX ORDER — ONDANSETRON HYDROCHLORIDE 2 MG/ML
4 INJECTION, SOLUTION INTRAVENOUS EVERY 8 HOURS PRN
Status: DISCONTINUED | OUTPATIENT
Start: 2025-05-31 | End: 2025-06-04 | Stop reason: HOSPADM

## 2025-05-31 RX ORDER — ACETAMINOPHEN 650 MG/1
650 SUPPOSITORY RECTAL EVERY 6 HOURS PRN
Status: DISCONTINUED | OUTPATIENT
Start: 2025-05-31 | End: 2025-06-04 | Stop reason: HOSPADM

## 2025-05-31 RX ORDER — IBUPROFEN 200 MG
24 TABLET ORAL
Status: DISCONTINUED | OUTPATIENT
Start: 2025-05-31 | End: 2025-06-04 | Stop reason: HOSPADM

## 2025-05-31 RX ADMIN — CEFTRIAXONE 1 G: 1 INJECTION, POWDER, FOR SOLUTION INTRAMUSCULAR; INTRAVENOUS at 09:05

## 2025-05-31 RX ADMIN — AZITHROMYCIN MONOHYDRATE 500 MG: 500 INJECTION, POWDER, LYOPHILIZED, FOR SOLUTION INTRAVENOUS at 09:05

## 2025-05-31 RX ADMIN — METRONIDAZOLE 500 MG: 5 INJECTION, SOLUTION INTRAVENOUS at 03:05

## 2025-05-31 RX ADMIN — METRONIDAZOLE 500 MG: 5 INJECTION, SOLUTION INTRAVENOUS at 11:05

## 2025-05-31 NOTE — ED NOTES
At about 1850  pt started coughing up blood  shortly after the pt became diaphoretic   Pts heart rate dropped to 42 bpm ,BP 89/74,O2 95%  Pt was responsive to verbal commands  Provider notified   Pts  bed was place in trendelenburg and IV fluids started  At about 18:59 pts bed was placed back into the neutral position  HOB elevated 30 degrees  Pts vital signs stablized P 65,114/62 BP, O2 98%

## 2025-05-31 NOTE — ASSESSMENT & PLAN NOTE
Patient has a diagnosis of pneumonia. The cause of the pneumonia is suspected to be bacterial in etiology but organism is not known. The pneumonia is stable. The patient has the following signs/symptoms of pneumonia: cough, sputum production, shortness of breath, and chest pain. The patient does not have a current oxygen requirement and the patient does not have a home oxygen requirement. I have reviewed the pertinent imaging. The following cultures have been collected: Blood cultures and Sputum culture The culture results are listed below.     Current antimicrobial regimen consists of the antibiotics listed below. Will monitor patient closely and continue current treatment plan unchanged.    Antibiotics (From admission, onward)      Start     Stop Route Frequency Ordered    05/31/25 1530  metronidazole IVPB 500 mg         -- IV Every 8 hours (non-standard times) 05/31/25 1419    05/31/25 1000  azithromycin (ZITHROMAX) 500 mg in 0.9% NaCl 250 mL IVPB (admixture device)         06/05/25 0959 IV Every 24 hours (non-standard times) 05/31/25 0849    05/31/25 0945  cefTRIAXone injection 1 g         -- IV Every 24 hours (non-standard times) 05/31/25 0844            Microbiology Results (last 7 days)       Procedure Component Value Units Date/Time    Culture, Respiratory with Gram Stain [[id number]] Collected: 05/31/25 1526    Order Status: Sent Specimen: Respiratory from Sputum, Expectorated Updated: 05/31/25 1555    AFB Culture & Smear [[id number]] Collected: 05/31/25 1526    Order Status: Sent Specimen: Sputum, Expectorated Updated: 05/31/25 1555    Blood culture [[id number]]  (Normal) Collected: 05/30/25 1705    Order Status: Completed Specimen: Blood from Peripheral, Antecubital, Left Updated: 05/31/25 1501     Blood Culture No Growth After 6 Hours    Blood Culture #2 **CANNOT BE ORDERED STAT** [[id number]]  (Normal) Collected: 05/30/25 2106    Order Status: Completed Specimen: Blood from Peripheral, Antecubital,  Right Updated: 05/31/25 1501     Blood Culture No Growth After 6 Hours    Culture, Respiratory with Gram Stain [[id number]] Collected: 05/31/25 0939    Order Status: Sent Specimen: Respiratory from Sputum Updated: 05/31/25 0979

## 2025-05-31 NOTE — PLAN OF CARE
[facility] - Med Surg  Initial Discharge Assessment       Primary Care Provider: Karla Dennis FNP    Admission Diagnosis: Shortness of breath [R06.02]  SOB (shortness of breath) [R06.02]  Symptomatic anemia [D64.9]  Pneumonia of left lower lobe due to infectious organism [J18.9]    Admission Date: 5/30/2025  Expected Discharge Date:     Transition of Care Barriers: None    Payor: [employer] MANAGED MEDICARE / Plan: [employer] H[employer] PPO SPECIAL NEEDS / Product Type: Medicare Advantage /     Extended Emergency Contact Information  Primary Emergency Contact: genny sexton  Address: [address] [place] [address] [facility]  Home Phone: [phone]  Mobile Phone: [phone]  Relation: Spouse  Preferred language: English   needed? No    Discharge Plan A: Home         [facility]  [address]  [place] [address]  Phone: [phone] Fax: [phone]      Initial Assessment (most recent)       Adult Discharge Assessment - 05/31/25 1002          Discharge Assessment    Assessment Type Discharge Planning Assessment     Confirmed/corrected address, phone number and insurance Yes     Confirmed Demographics Correct on Facesheet     Source of Information patient     Communicated ADEOLA with patient/caregiver Date not available/Unable to determine     People in Home spouse     Name(s) of People in Home eliseo Arriaga, [phone]     Do you expect to return to your current living situation? Yes     Do you have help at home or someone to help you manage your care at home? No     Prior to hospitilization cognitive status: Alert/Oriented     Current cognitive status: Alert/Oriented     Walking or Climbing Stairs Difficulty no     Dressing/Bathing Difficulty no     Home Accessibility wheelchair accessible     Home Layout Able to live on 1st floor     Equipment Currently Used at Home cane, straight;walker, rolling     Readmission within 30 days? No      Patient currently being followed by outpatient case management? No     Do you currently have service(s) that help you manage your care at home? No     Do you take prescription medications? No     Do you have prescription coverage? Yes     Do you have any problems affording any of your prescribed medications? TBD     Who is going to help you get home at discharge? needs transportation     How do you get to doctors appointments? health plan transportation     Are you on dialysis? No     Do you take coumadin? No     Discharge Plan A Home     DME Needed Upon Discharge  none     Discharge Plan discussed with: Patient     Transition of Care Barriers None

## 2025-05-31 NOTE — ASSESSMENT & PLAN NOTE
Anemia is likely due to chronic blood loss. Most recent hemoglobin and hematocrit are listed below.  Recent Labs     05/30/25  1838 05/30/25  2116 05/31/25  0837   HGB 3.9* 5.0* 7.0*   HCT 15.6* 17.7* 23.9*     Plan  - Monitor serial CBC: Every 8 hours  - Transfuse PRBC if patient becomes hemodynamically unstable, symptomatic or H/H drops below 7/21.  - s/p 4 units of prbc transfusion  - Patient's anemia is currently improving  -Hold antiplatelet/anticoagulation therapies   -Continue blood transfusion    5/31  Hb 7

## 2025-05-31 NOTE — HPI
Shaan Tucker is a 64 y.o. male with a PMH  has a past medical history of ADHD (attention deficit hyperactivity disorder), Autism, Fetal alcohol syndrome, and Seizures. who presented to the ED via EMS for further evaluation of persistent and worsening blood-tinged sputum x3 months duration.  Patient reported he has not seen a physician regarding this matter due to lack of transportation and reported calling EMS after experiencing extreme dizziness and inability to stand up.  Patient also reported taking over-the-counter naproxen daily over the past few months but denies use of other antiplatelet/anticoagulation therapies, and denied overseas or long-distance travel, TB exposure, experiencing any fever, chills, sweats, weight loss, epistaxis, hematemesis, abdominal pain, hematuria, hematochezia, or evidence of melena.  Associated symptoms also included worsening generalized weakness/fatigue, intermittent chest pain, and dyspnea/shortness of breath with exertion only.  He denies taking any prescribed medications, prior surgeries or blood transfusions, or experiencing similar symptoms previously.  Patient does report smoking cigars occasionally as well as smoking marijuana and doing cocaine.  He does not use inhalers, nebulizers, home oxygen, or CPAP outpatient and reported being in his usual state of health prior to onset of symptoms.  All other review of systems negative except as noted above.  Initial workup in the ED revealed patient to be afebrile without leukocytosis, initially hypotensive with blood pressure measuring 88/58 which improved following emergent blood transfusion, saturating 100% on room air in no acute distress and without use of accessory muscles noted, H/H 3.9/15.6, , troponin negative, urine toxicology positive for cocaine and THC, and chest x-ray positive for left lower lobe and left mid lung consolidations with small effusions and addition to multiple granulomas with clear appearing right  lung.  Patient received emergent 2u of unmatched blood and 1u crossmatch blood in the ED with additional 1 unit crossmatch blood pending transfusion.  Patient underwent CT chest for further evaluation and admitted to Hospital Medicine inpatient for continued medical management.      PCP: Karla Dennis

## 2025-05-31 NOTE — SUBJECTIVE & OBJECTIVE
Past Medical History:   Diagnosis Date    ADHD (attention deficit hyperactivity disorder)     Autism     Fetal alcohol syndrome     Seizures        Past Surgical History:   Procedure Laterality Date    CYST REMOVAL         Review of patient's allergies indicates:   Allergen Reactions    Chantix [varenicline]      Seizures. Pt states that he can't remember if it was Chantix or Wellbutrin that he had seizures with.    Wellbutrin [bupropion hcl]      seizures       No current facility-administered medications on file prior to encounter.     Current Outpatient Medications on File Prior to Encounter   Medication Sig    multivitamin (THERAGRAN) per tablet Take 1 tablet by mouth once daily.    nicotine (NICODERM CQ) 21 mg/24 hr Place 1 patch onto the skin once daily.    nicotine, polacrilex, 2 mg lzmn Take 1 each (2 mg total) by mouth as needed (use no more than 6 lozenges in 24 hours while wearing a nicotine patch).     Family History    None       Tobacco Use    Smoking status: Former     Current packs/day: 0.00     Average packs/day: 1 pack/day for 33.0 years (33.0 ttl pk-yrs)     Types: Cigarettes, Cigars     Start date: 1988     Quit date: 2021     Years since quittin.3    Smokeless tobacco: Never    Tobacco comments:     cigarillos   Substance and Sexual Activity    Alcohol use: Not on file    Drug use: Not on file    Sexual activity: Not on file     Review of Systems   All other systems reviewed and are negative.    Objective:     Vital Signs (Most Recent):  Temp: 98.3 °F (36.8 °C) (25 0040)  Pulse: 62 (25 0157)  Resp: 20 (25 0040)  BP: 124/68 (25 0040)  SpO2: 98 % (25 0040) Vital Signs (24h Range):  Temp:  [97.8 °F (36.6 °C)-98.4 °F (36.9 °C)] 98.3 °F (36.8 °C)  Pulse:  [62-73] 62  Resp:  [16-20] 20  SpO2:  [97 %-100 %] 98 %  BP: ()/(52-78) 124/68     Weight: 59.3 kg (130 lb 11.7 oz)  Body mass index is 19.31 kg/m².     Physical Exam  Vitals reviewed.    Constitutional:       General: He is not in acute distress.     Appearance: Normal appearance. He is normal weight. He is not ill-appearing, toxic-appearing or diaphoretic.   HENT:      Head: Normocephalic and atraumatic.      Right Ear: External ear normal.      Left Ear: External ear normal.      Nose: Nose normal. No congestion or rhinorrhea.      Mouth/Throat:      Mouth: Mucous membranes are dry.      Pharynx: Oropharynx is clear. No oropharyngeal exudate or posterior oropharyngeal erythema.      Comments: Poor dentition  Eyes:      General: No scleral icterus.     Extraocular Movements: Extraocular movements intact.      Conjunctiva/sclera: Conjunctivae normal.      Pupils: Pupils are equal, round, and reactive to light.      Comments: Pallor noted   Neck:      Vascular: No carotid bruit.   Cardiovascular:      Rate and Rhythm: Normal rate and regular rhythm.      Pulses: Normal pulses.      Heart sounds: Normal heart sounds. No murmur heard.     No friction rub. No gallop.   Pulmonary:      Effort: Pulmonary effort is normal. No respiratory distress.      Breath sounds: No stridor. No wheezing, rhonchi or rales.      Comments: Right lung clear to auscultation throughout.  Left lung positive for expiratory wheezing and coarse breath sounds noted throughout greater in lower lobe  Chest:      Chest wall: No tenderness.   Abdominal:      General: Abdomen is flat. Bowel sounds are normal. There is no distension.      Palpations: Abdomen is soft. There is no mass.      Tenderness: There is no abdominal tenderness. There is no right CVA tenderness, left CVA tenderness, guarding or rebound.      Hernia: No hernia is present.   Musculoskeletal:         General: No swelling, tenderness, deformity or signs of injury. Normal range of motion.      Cervical back: Normal range of motion and neck supple. No rigidity or tenderness.      Right lower leg: No edema.      Left lower leg: No edema.   Lymphadenopathy:       Cervical: No cervical adenopathy.   Skin:     General: Skin is warm and dry.      Capillary Refill: Capillary refill takes less than 2 seconds.      Coloration: Skin is not jaundiced or pale.      Findings: No bruising, erythema, lesion or rash.   Neurological:      General: No focal deficit present.      Mental Status: He is alert and oriented to person, place, and time. Mental status is at baseline.      Cranial Nerves: No cranial nerve deficit.      Sensory: No sensory deficit.      Motor: No weakness.      Coordination: Coordination normal.   Psychiatric:         Mood and Affect: Mood normal.         Behavior: Behavior normal.         Thought Content: Thought content normal.         Judgment: Judgment normal.              CRANIAL NERVES     CN III, IV, VI   Pupils are equal, round, and reactive to light.       Significant Labs: All pertinent labs within the past 24 hours have been reviewed.    Significant Imaging: I have reviewed all pertinent imaging results/findings within the past 24 hours.    LABS:  Recent Results (from the past 24 hours)   Hepatitis C Antibody    Collection Time: 05/30/25  5:47 PM   Result Value Ref Range    Hep C Ab Interp Negative Negative   HCV Virus Hold Specimen    Collection Time: 05/30/25  5:47 PM   Result Value Ref Range    Extra Tube Hold for add-ons.    HIV 1/2 Ag/Ab (4th Gen)    Collection Time: 05/30/25  5:47 PM   Result Value Ref Range    HIV 1/2 Ag/Ab Negative Negative   Comprehensive metabolic panel    Collection Time: 05/30/25  5:47 PM   Result Value Ref Range    Sodium 135 (L) 136 - 145 mmol/L    Potassium 3.9 3.5 - 5.1 mmol/L    Chloride 103 95 - 110 mmol/L    CO2 24 23 - 29 mmol/L    Glucose 97 70 - 110 mg/dL    BUN 22 8 - 23 mg/dL    Creatinine 0.9 0.5 - 1.4 mg/dL    Calcium 8.7 8.7 - 10.5 mg/dL    Protein Total 6.2 6.0 - 8.4 gm/dL    Albumin 3.3 (L) 3.5 - 5.2 g/dL    Bilirubin Total 0.5 0.1 - 1.0 mg/dL    ALP 69 40 - 150 unit/L    AST 12 11 - 45 unit/L    ALT 11 10 - 44  unit/L    Anion Gap 8 8 - 16 mmol/L    eGFR >60 >60 mL/min/1.73/m2   Troponin I #1    Collection Time: 05/30/25  5:47 PM   Result Value Ref Range    Troponin-I <0.006 <=0.026 ng/mL   BNP    Collection Time: 05/30/25  5:47 PM   Result Value Ref Range     (H) 0 - 99 pg/mL   ABORH RETYPE    Collection Time: 05/30/25  5:47 PM   Result Value Ref Range    ABORH Retype A POS    CBC with Differential    Collection Time: 05/30/25  6:38 PM   Result Value Ref Range    WBC 6.74 3.90 - 12.70 K/uL    RBC 2.25 (L) 4.60 - 6.20 M/uL    HGB 3.9 (LL) 14.0 - 18.0 gm/dL    HCT 15.6 (LL) 40.0 - 54.0 %    MCV 69 (L) 82 - 98 fL    MCH 17.3 (L) 27.0 - 31.0 pg    MCHC 25.0 (L) 32.0 - 36.0 g/dL    RDW 21.0 (H) 11.5 - 14.5 %    Platelet Count 233 150 - 450 K/uL    MPV 9.2 9.2 - 12.9 fL    Nucleated RBC 0 <=0 /100 WBC    Neut % 74.6 (H) 38 - 73 %    Lymph % 16.5 (L) 18 - 48 %    Mono % 6.1 4 - 15 %    Eos % 2.4 <=8 %    Basophil % 0.1 <=1.9 %    Imm Grans % 0.3 0.0 - 0.5 %    Neut # 5.03 1.8 - 7.7 K/uL    Lymph # 1.11 1 - 4.8 K/uL    Mono # 0.41 0.3 - 1 K/uL    Eos # 0.16 <=0.5 K/uL    Baso # 0.01 <=0.2 K/uL    Imm Grans # 0.02 0.00 - 0.04 K/uL   Ethanol    Collection Time: 05/30/25  6:38 PM   Result Value Ref Range    Alcohol, Serum <10 <10 mg/dL   Prepare RBC 2 Units; hb: 4    Collection Time: 05/30/25  6:38 PM   Result Value Ref Range    UNIT NUMBER [id number]     UNIT ABO/RH A POS     DISPENSE STATUS Transfused     Unit Expiration [id number]     Product Code O4740N27     Unit Blood Type Code 6200     CROSSMATCH INTERPRETATION Compatible     UNIT NUMBER [id number]     UNIT ABO/RH A POS     DISPENSE STATUS Selected     Unit Expiration [id number]     Product Code [id number]     Unit Blood Type Code 6200     CROSSMATCH INTERPRETATION Compatible    Type & Screen    Collection Time: 05/30/25  6:38 PM   Result Value Ref Range    Specimen Outdate 06/02/2025 23:59     Group & Rh A POS     Indirect [place] NEG    Prepare RBC 2 Units;  Emergency    Collection Time: 05/30/25  6:38 PM   Result Value Ref Range    UNIT NUMBER [id number]     UNIT ABO/RH O NEG     DISPENSE STATUS Transfused     Unit Expiration [id number]     Product Code A5124K07     Unit Blood Type Code 9500     CROSSMATCH INTERPRETATION Compatible     UNIT NUMBER [id number]     UNIT ABO/RH O NEG     DISPENSE STATUS Transfused     Unit Expiration [id number]     Product Code P7789F96     Unit Blood Type Code 9500     CROSSMATCH INTERPRETATION Compatible    Troponin I #2    Collection Time: 05/30/25  8:42 PM   Result Value Ref Range    Troponin-I <0.006 <=0.026 ng/mL   Hemoglobin    Collection Time: 05/30/25  9:16 PM   Result Value Ref Range    HGB 5.0 (LL) 14.0 - 18.0 gm/dL   Hematocrit    Collection Time: 05/30/25  9:16 PM   Result Value Ref Range    HCT 17.7 (LL) 40.0 - 54.0 %   Drug screen panel, in-house    Collection Time: 05/30/25  9:35 PM   Result Value Ref Range    Benzodiazepine, Urine Negative Negative    Methadone, Urine Negative Negative    Cocaine, Urine Presumptive Positive (A) Negative    Opiates, Urine Negative Negative    Barbiturates, Urine Negative Negative    Amphetamines, Urine Negative Negative    THC Presumptive Positive (A) Negative    Phencyclidine, Urine Negative Negative    Urine Creatinine 98.4 23.0 - 375.0 mg/dL       RADIOLOGY  X-Ray Chest AP Portable  Result Date: 5/30/2025  EXAM: XR CHEST AP PORTABLE CLINICAL INDICATION:     Shortness of breath. TECHNIQUE/FINDINGS: Left lower lobe and left midlung consolidation with small effusion.  Right lung is clear.  Multiple granulomas.  Cardiac silhouette and mediastinum within normal limits.     1.  Pneumonia. Finalized on: 5/30/2025 6:19 PM By:  Jluis Wick MD [facility]# [id number]      2025-05-30 18:21:33.995     [facility]      EKG    MICROBIOLOGY    MDM     PAST MEDICAL HISTORY:  Arthritis     Chronic atrial fibrillation     CVA (cerebrovascular accident)     Dementia     History of macular degeneration     HTN (hypertension)

## 2025-05-31 NOTE — ASSESSMENT & PLAN NOTE
Patient has a diagnosis of pneumonia. The cause of the pneumonia is suspected to be bacterial in etiology but organism is not known. The pneumonia is stable. The patient has the following signs/symptoms of pneumonia: cough, sputum production, shortness of breath, and chest pain. The patient does not have a current oxygen requirement and the patient does not have a home oxygen requirement. I have reviewed the pertinent imaging. The following cultures have been collected: Blood cultures and Sputum culture The culture results are listed below.     Current antimicrobial regimen consists of the antibiotics listed below. Will monitor patient closely and continue current treatment plan unchanged.    Antibiotics (From admission, onward)      None            Microbiology Results (last 7 days)       Procedure Component Value Units Date/Time    Blood culture [[id number]] Collected: 05/30/25 1705    Order Status: Sent Specimen: Blood from Peripheral, Antecubital, Left Updated: 05/30/25 2119    Blood Culture #2 **CANNOT BE ORDERED STAT** [[id number]] Collected: 05/30/25 2106    Order Status: Sent Specimen: Blood from Peripheral, Antecubital, Right Updated: 05/30/25 2119

## 2025-05-31 NOTE — ASSESSMENT & PLAN NOTE
Patient presented with persistent and worsening blood-tinged sputum/hemoptysis x3-6 months duration. Patient reported taking over-the-counter naproxen daily over the past few months but denies use of other antiplatelet/anticoagulation therapies, overseas or long-distance travel, TB exposure, experiencing any fever, chills, sweats, weight loss, epistaxis, hematemesis, abdominal pain, hematuria, hematochezia, or evidence of melena.  Patient reports smoking cigars occasionally as well as use of marijuana and cocaine. Initial workup in the ED revealed patient to be afebrile without leukocytosis, initially hypotensive with blood pressure measuring 88/58 which improved following emergent blood transfusion, saturating 100% on room air in no acute distress and without use of accessory muscles noted, H/H 3.9/15.6, , troponin negative, urine toxicology positive for cocaine and THC. Chest x-ray positive for left lower lobe and left mid lung consolidations with small effusions and addition to multiple granulomas with clear appearing right lung.  Patient s/p emergent 2u of unmatched blood and 1u crossmatch blood in the ED with additional 1 unit crossmatch blood pending transfusion.  Patient underwent CT chest for further evaluation and pending results.   Plan:  -NPO  -Telemetry  -Continue current pain regimen, titrate as needed  -Bowel regimen  -Bedrest  -IVFs prn  -Continue blood transfusion  -Hold antiplatelet/anticoagulation therapies  -Antiemetics prn  -Tylenol as needed for fever  -f/u CT chest and cultures  -Continue antibiotics   -f/u pulmonology in a.m.

## 2025-05-31 NOTE — ASSESSMENT & PLAN NOTE
Patient presented with persistent and worsening blood-tinged sputum/hemoptysis x3-6 months duration. Patient reported taking over-the-counter naproxen daily over the past few months but denies use of other antiplatelet/anticoagulation therapies, overseas or long-distance travel, TB exposure, experiencing any fever, chills, sweats, weight loss, epistaxis, hematemesis, abdominal pain, hematuria, hematochezia, or evidence of melena.  Patient reports smoking cigars occasionally as well as use of marijuana and cocaine. Initial workup in the ED revealed patient to be afebrile without leukocytosis, initially hypotensive with blood pressure measuring 88/58 which improved following emergent blood transfusion, saturating 100% on room air in no acute distress and without use of accessory muscles noted, H/H 3.9/15.6, , troponin negative, urine toxicology positive for cocaine and THC. Chest x-ray positive for left lower lobe and left mid lung consolidations with small effusions and addition to multiple granulomas with clear appearing right lung.  Patient s/p emergent 2u of unmatched blood and 1u crossmatch blood in the ED with additional 1 unit crossmatch blood pending transfusion.  Patient underwent CT chest for further evaluation and pending results.   Plan:  -NPO  -Telemetry  -Continue current pain regimen, titrate as needed  -Bowel regimen  -Bedrest  -IVFs prn  -Continue blood transfusion  -Hold antiplatelet/anticoagulation therapies  -Antiemetics prn  -Tylenol as needed for fever  -f/u CT chest and cultures  -Continue antibiotics   -f/u pulmonology --  per pulm-- ?  Aspiration component;  recommended antibiotics including anaerobic coverage,; Given chronicity and risk of atypical mycobacteria, recommended AFB; recommended Repeat imaging with CT in 4-6 weeks to ensure resolution; chest radiograph in 2-3 days to gauge improvement

## 2025-05-31 NOTE — PLAN OF CARE
Discussed poc with pt, pt verbalized understanding    Purposeful rounding every 2hours    VS wnl  Cardiac monitoring in use, pt is NSR, tele monitor # [id number]  Fall precautions in place, remains injury free  Pt denies c/o pain or nausea.      Accurate I&Os  Abx given as prescribed  Bed locked at lowest position  Call light within reach    Chart check complete  Will cont with POC

## 2025-05-31 NOTE — HOSPITAL COURSE
Admitted under Hospital Medicine with hemoptysis (ongoing for over past 3-4 months, did not follow up with PCP/pulmonology outpatient so far), severe anemia, weight loss, pneumonia. History of noncompliance with outpatient follow-up visits, medications . Active smoking, smoking over past 30 years     CT chest showed- Extensive left consolidation consistent with pneumonia. Associated volume loss with shift of the mediastinum to the left. Small left pleural effusion. Severe anemia on arrival, status post 4 units of PRBC transfusion on 05/30 and started on IV iron infusions inpatient.     Pulmonology consulted, per pulm--likely ?Aspiration component;  recommended antibiotics including anaerobic coverage, Given chronicity and risk of atypical mycobacteria, recommended AFB; recommended Repeat imaging with CT in 4-6 weeks to ensure resolution  Sputum culture with normal respiratory yohan.  Prelim AFB culture with no acid-fast bacilli seen on stain.    As of 06/03, hemoptysis resolved, transfusing 1u PRBC 06/03 for Hgb 7. Repeat CXR fairly unchanged since admission, still with L sided consolidation. Discussed with Pulmonology Dr. Velazquez- he recommends transition to Levofloxacin to complete 7d course of antibiotics and outpatient pulm followup to ensure resolution. Anticipate discharge in [place] if Hgb remains stable.     06/04- patient is seen and examined.  hemoglobin improved to 9.0.  Patient reports no hemoptysis.  Denies chest pain, shortness of breath, sats stable on room air.  Appears stable for discharge home today with p.o. levofloxacin to complete total 7 day course of antibiotics per pulmonology recommendations.  will follow up with PCP within 3-5 days, and pulmonology as scheduled on 06/12/2025 for further management/repeat imaging.  Discharge medications, return precautions and follow-up discussed at length with the patient, all questions answered to satisfaction.

## 2025-05-31 NOTE — SUBJECTIVE & OBJECTIVE
Interval History:     No acute events overnight   Complaining of ongoing intermittent hemoptysis   Hemoglobin trended up to 7   Currently on empiric antibiotics   Follow up on cultures   Procalcitonin   Pulmonology follow up    Review of Systems  Objective:     Vital Signs (Most Recent):  Temp: 98.4 °F (36.9 °C) (05/31/25 1242)  Pulse: 63 (05/31/25 1631)  Resp: 16 (05/31/25 1631)  BP: (!) 124/58 (05/31/25 1631)  SpO2: 96 % (05/31/25 1631) Vital Signs (24h Range):  Temp:  [97.8 °F (36.6 °C)-98.6 °F (37 °C)] 98.4 °F (36.9 °C)  Pulse:  [61-72] 63  Resp:  [16-20] 16  SpO2:  [95 %-100 %] 96 %  BP: ()/(55-78) 124/58     Weight: 63.5 kg (140 lb)  Body mass index is 20.67 kg/m².    Intake/Output Summary (Last 24 hours) at 5/31/2025 1704  Last data filed at 5/31/2025 1308  Gross per 24 hour   Intake 3475.08 ml   Output --   Net 3475.08 ml         Physical Exam      Constitutional:       General: He is not in acute distress.     Appearance: Normal appearance. He is normal weight. He is not ill-appearing, toxic-appearing or diaphoretic.   HENT:      Head: Normocephalic and atraumatic.      Right Ear: External ear normal.      Left Ear: External ear normal.      Nose: Nose normal. No congestion or rhinorrhea.      Mouth/Throat:      Mouth: Mucous membranes are dry.      Pharynx: Oropharynx is clear. No oropharyngeal exudate or posterior oropharyngeal erythema.      Comments: Poor dentition  Eyes:      General: No scleral icterus.     Extraocular Movements: Extraocular movements intact.      Conjunctiva/sclera: Conjunctivae normal.      Pupils: Pupils are equal, round, and reactive to light.      Comments: Pallor noted   Neck:      Vascular: No carotid bruit.   Cardiovascular:      Rate and Rhythm: Normal rate and regular rhythm.      Pulses: Normal pulses.      Heart sounds: Normal heart sounds. No murmur heard.     No friction rub. No gallop.   Pulmonary:      Effort: Pulmonary effort is normal. No respiratory distress.       Breath sounds: No stridor. No wheezing, rhonchi or rales.      Comments: Right lung clear to auscultation throughout.  Left lung positive for expiratory wheezing and coarse breath sounds noted throughout greater in lower lobe  Chest:      Chest wall: No tenderness.   Abdominal:      General: Abdomen is flat. Bowel sounds are normal. There is no distension.      Palpations: Abdomen is soft. There is no mass.      Tenderness: There is no abdominal tenderness. There is no right CVA tenderness, left CVA tenderness, guarding or rebound.      Hernia: No hernia is present.   Musculoskeletal:         General: No swelling, tenderness, deformity or signs of injury. Normal range of motion.      Cervical back: Normal range of motion and neck supple. No rigidity or tenderness.      Right lower leg: No edema.      Left lower leg: No edema.   Lymphadenopathy:      Cervical: No cervical adenopathy.   Skin:     General: Skin is warm and dry.      Capillary Refill: Capillary refill takes less than 2 seconds.      Coloration: Skin is not jaundiced or pale.      Findings: No bruising, erythema, lesion or rash.   Neurological:      General: No focal deficit present.      Mental Status: He is alert and oriented to person, place, and time. Mental status is at baseline.      Cranial Nerves: No cranial nerve deficit.      Sensory: No sensory deficit.      Motor: No weakness.      Coordination: Coordination normal.   Psychiatric:         Mood and Affect: Mood normal.         Behavior: Behavior normal.         Thought Content: Thought content normal.         Judgment: Judgment normal.     Significant Labs: All pertinent labs within the past 24 hours have been reviewed.  CBC:   Recent Labs   Lab 05/30/25  1838 05/30/25  2116 05/31/25  0837   WBC 6.74  --  6.47   HGB 3.9* 5.0* 7.0*   HCT 15.6* 17.7* 23.9*     --  195     CMP:   Recent Labs   Lab 05/30/25  1747 05/31/25  0837   * 137   K 3.9 4.3    109   CO2 24 22*   GLU  97 76   BUN 22 16   CREATININE 0.9 0.8   CALCIUM 8.7 8.2*   PROT 6.2 5.8*   ALBUMIN 3.3* 3.0*   BILITOT 0.5 1.0   ALKPHOS 69 63   AST 12 13   ALT 11 7*   ANIONGAP 8 6*       Significant Imaging:   Imaging Results              CT Chest With Contrast (Final result)  Result time 05/31/25 08:31:57      Final result by Alfonso Smith MD (05/31/25 08:31:57)                   Impression:     Extensive left consolidation consistent with pneumonia.  Associated volume loss with shift of the mediastinum to the left.  Small left pleural effusion.    Finalized on: 5/31/2025 8:31 AM By:  Alfonso Smith MD  [facility]# [id number]      2025-05-31 08:34:02.126     [facility]               Narrative:    EXAM:  CT CHEST WITH CONTRAST    CLINICAL HISTORY:  Hemoptysis.    COMPARISON: None.    TECHNIQUE:  Standard contrast-enhanced CT scan of the chest performed with 75 mL Isovue-370.  All CT scans at [this location] are performed using dose modulation techniques as appropriate to a performed exam including the following: automated exposure control; adjustment of the mA and/or kV according to patient size (this includes techniques or standardized protocols for targeted exams where dose is matched to indication / reason for exam; i.e. extremities or head); use of iterative reconstruction technique.    FINDINGS:  There is extensive consolidation involving the left upper and to a greater extent left lower lobes.  There is associated volume loss with shift of the mediastinum to the left.    There is a small left pleural effusion.    The right lung is grossly clear with minor atelectasis in the lung base.    The cardiac size is normal.  There is moderate coronary artery calcification.    The great vessels appear normal.  The pulmonary arteries are mildly enlarged.  No large filling defect    In the upper abdomen no significant finding is appreciated.                                         X-Ray Chest AP Portable (Final result)  Result time  05/30/25 18:19:29      Final result by Jluis Wick Jr., MD (05/30/25 18:19:29)                   Impression:      1.  Pneumonia.    Finalized on: 5/30/2025 6:19 PM By:  Jluis Wick MD  [facility]# [id number]      2025-05-30 18:21:33.995     [facility]               Narrative:    EXAM: XR CHEST AP PORTABLE    CLINICAL INDICATION:     Shortness of breath.    TECHNIQUE/FINDINGS: Left lower lobe and left midlung consolidation with small effusion.  Right lung is clear.  Multiple granulomas.  Cardiac silhouette and mediastinum within normal limits.

## 2025-05-31 NOTE — PLAN OF CARE
Discussed plan of care with pt. Pt verbalized understanding. No signs of acute distress. Q2h turns/repositioning encouraged. Bed alarm set with bed at lowest position. Tele monitor [id number] in place. Call light within reach. Purposeful rounding Q2h.      Chart check complete.     Problem: Adult Inpatient Plan of Care  Goal: Plan of Care Review  Outcome: Progressing  Goal: Patient-Specific Goal (Individualized)  Outcome: Progressing  Goal: Absence of Hospital-Acquired Illness or Injury  Outcome: Progressing  Goal: Optimal Comfort and Wellbeing  Outcome: Progressing  Goal: Readiness for Transition of Care  Outcome: Progressing     Problem: Fall Injury Risk  Goal: Absence of Fall and Fall-Related Injury  Outcome: Progressing     Problem: Pain Acute  Goal: Optimal Pain Control and Function  Outcome: Progressing

## 2025-05-31 NOTE — ASSESSMENT & PLAN NOTE
Anemia is likely due to chronic blood loss. Most recent hemoglobin and hematocrit are listed below.  Recent Labs     05/30/25  1838 05/30/25  2116   HGB 3.9* 5.0*   HCT 15.6* 17.7*     Plan  - Monitor serial CBC: Every 8 hours  - Transfuse PRBC if patient becomes hemodynamically unstable, symptomatic or H/H drops below 7/21.  - Patient has received 3 units of PRBCs on admission (2 emergent and 1 crossmatched in the ED with additional 1u crossmatched pending transfusion  - Patient's anemia is currently improving  -Hold antiplatelet/anticoagulation therapies   -Continue blood transfusion  -Pulmonology consult in a.m.   -f/u CT Chest

## 2025-05-31 NOTE — CONSULTS
[facility]  Critical Care - Medicine  Consult Note    Patient Name: Shaan Tucker  MRN: [id number]  Admission Date: 2025  Hospital Length of Stay: 1 days  Code Status: Full Code  Attending Physician: Breanne Goncalves,*  Primary Care Provider: Karla Dennis FNP   Principal Problem: Hemoptysis    Consults  Subjective:     HPI:   63 yo male with multiple chronic medical issues admitted with cough, failure to thrive and blood tinged sputum for > 1 month. No reported risk factors for TB exosure. CT shows extensive/dense LLL pneumonia. Tox screen + for cocaine and THC. Profoundly anemic at , given PRBC in ER. Pulmonary consulted for the above      Past Medical History:   Diagnosis Date    ADHD (attention deficit hyperactivity disorder)     Autism     Fetal alcohol syndrome     Seizures        Past Surgical History:   Procedure Laterality Date    CYST REMOVAL         Review of patient's allergies indicates:   Allergen Reactions    Chantix [varenicline]      Seizures. Pt states that he can't remember if it was Chantix or Wellbutrin that he had seizures with.    Wellbutrin [bupropion hcl]      seizures       Family History    None       Tobacco Use    Smoking status: Former     Current packs/day: 0.00     Average packs/day: 1 pack/day for 33.0 years (33.0 ttl pk-yrs)     Types: Cigarettes, Cigars     Start date: 1988     Quit date: 2021     Years since quittin.3    Smokeless tobacco: Never    Tobacco comments:     cigarillos   Substance and Sexual Activity    Alcohol use: Not on file    Drug use: Not on file    Sexual activity: Not on file      Review of Systems otherwise negative  Objective:     Vital Signs (Most Recent):  Temp: 98.4 °F (36.9 °C) (25 1242)  Pulse: 63 (25 1307)  Resp: 16 (25 1242)  BP: 132/63 (25 1242)  SpO2: 97 % (25 1242) Vital Signs (24h Range):  Temp:  [97.8 °F (36.6 °C)-98.6 °F (37 °C)] 98.4 °F (36.9 °C)  Pulse:  [61-73] 63  Resp:  [16-20]  16  SpO2:  [95 %-100 %] 97 %  BP: ()/(52-78) 132/63     Weight: 63.5 kg (140 lb)  Body mass index is 20.67 kg/m².      Intake/Output Summary (Last 24 hours) at 5/31/2025 1412  Last data filed at 5/31/2025 1308  Gross per 24 hour   Intake 3475.08 ml   Output --   Net 3475.08 ml       Physical Exam  GEN: Not in distress  HEENT: poor dentition, abnormal facies  NECK: no JVD  CHEST: ronchi and crackles left > right  HEART: regular  ABD: soft, NT  No clubbing    \    Significant Labs:    CBC/Anemia Profile:  Recent Labs   Lab 05/30/25  1838 05/30/25  2116 05/31/25  0837   WBC 6.74  --  6.47   HGB 3.9* 5.0* 7.0*   HCT 15.6* 17.7* 23.9*     --  195   MCV 69*  --  74*   RDW 21.0*  --  21.5*        Chemistries:  Recent Labs   Lab 05/30/25  1747 05/31/25  0837   * 137   K 3.9 4.3    109   CO2 24 22*   BUN 22 16   CREATININE 0.9 0.8   CALCIUM 8.7 8.2*   ALBUMIN 3.3* 3.0*   PROT 6.2 5.8*   BILITOT 0.5 1.0   ALKPHOS 69 63   ALT 11 7*   AST 12 13         Assessment/Plan:     Active Diagnoses:    Diagnosis Date Noted POA    PRINCIPAL PROBLEM:  Hemoptysis [R04.2] 05/31/2025 Yes    Symptomatic anemia [D64.9] 05/31/2025 Yes    Pneumonia [J18.9] 05/31/2025 Yes      Problems Resolved During this Admission:     - Dense LLL pneumonia  - Certainly could be consistent with aspiration  - Abx should include anaerobic coverage  - Will get sputum culture  - Given chronicity and risk of atypical mycobacteria, will get AFB  - Do not suspect MTB  - Nutrition  - Repeat imaging with CT in 4-6 weeks to ensure resolution  - chest radiograph in 2-3 days to gauge improvement  - Anemia improved post PRBC  - Supportive care  - Will follow     Otto Tran MD  Critical Care - Medicine  [facility]

## 2025-05-31 NOTE — PROGRESS NOTES
[facility]  Progress Note    Patient Name: Shaan Tucker  MRN: [id number]  Patient Class: IP- Inpatient   Admission Date: 5/30/2025  Length of Stay: 1 days  Attending Physician: Breanne Goncalves,*  Primary Care Provider: Karla Dennis FNP        Subjective     Principal Problem:Hemoptysis        HPI:  Shaan Tucker is a 64 y.o. male with a PMH  has a past medical history of ADHD (attention deficit hyperactivity disorder), Autism, Fetal alcohol syndrome, and Seizures. who presented to the ED via EMS for further evaluation of persistent and worsening blood-tinged sputum x3 months duration.  Patient reported he has not seen a physician regarding this matter due to lack of transportation and reported calling EMS after experiencing extreme dizziness and inability to stand up.  Patient also reported taking over-the-counter naproxen daily over the past few months but denies use of other antiplatelet/anticoagulation therapies, and denied overseas or long-distance travel, TB exposure, experiencing any fever, chills, sweats, weight loss, epistaxis, hematemesis, abdominal pain, hematuria, hematochezia, or evidence of melena.  Associated symptoms also included worsening generalized weakness/fatigue, intermittent chest pain, and dyspnea/shortness of breath with exertion only.  He denies taking any prescribed medications, prior surgeries or blood transfusions, or experiencing similar symptoms previously.  Patient does report smoking cigars occasionally as well as smoking marijuana and doing cocaine.  He does not use inhalers, nebulizers, home oxygen, or CPAP outpatient and reported being in his usual state of health prior to onset of symptoms.  All other review of systems negative except as noted above.  Initial workup in the ED revealed patient to be afebrile without leukocytosis, initially hypotensive with blood pressure measuring 88/58 which improved following emergent blood transfusion,  saturating 100% on room air in no acute distress and without use of accessory muscles noted, H/H 3.9/15.6, , troponin negative, urine toxicology positive for cocaine and THC, and chest x-ray positive for left lower lobe and left mid lung consolidations with small effusions and addition to multiple granulomas with clear appearing right lung.  Patient received emergent 2u of unmatched blood and 1u crossmatch blood in the ED with additional 1 unit crossmatch blood pending transfusion.  Patient underwent CT chest for further evaluation and admitted to Hospital Medicine inpatient for continued medical management.      PCP: Karla Dennis      Overview/Hospital Course:  Admitted under Hospital Medicine with hemoptysis (ongoing for over past 3-4 months, did not follow up with PCP/pulmonology outpatient so far), severe anemia, weight loss, pneumonia   CT chest showed- Extensive left consolidation consistent with pneumonia. Associated volume loss with shift of the mediastinum to the left. Small left pleural effusion.   History of noncompliance with outpatient follow-up visits, medications   Active smoking, smoking over past 30 years   Weight loss of approximately 40 lb over past 3 months   Severe anemia on arrival, status post 4 units of PRBC transfusion on 05/30  Pulmonology on board- per pulm-- ?  Aspiration component;  recommended antibiotics including anaerobic coverage,; Given chronicity and risk of atypical mycobacteria, recommended AFB; recommended Repeat imaging with CT in 4-6 weeks to ensure resolution; chest radiograph in 2-3 days to gauge improvement    Dispo- pending clinical improvement, pulmonology clearance    Interval History:     No acute events overnight   Complaining of ongoing intermittent hemoptysis   Hemoglobin trended up to 7   Currently on empiric antibiotics   Follow up on cultures   Procalcitonin   Pulmonology follow up    Review of Systems  Objective:     Vital Signs (Most Recent):  Temp:  98.4 °F (36.9 °C) (05/31/25 1242)  Pulse: 63 (05/31/25 1631)  Resp: 16 (05/31/25 1631)  BP: (!) 124/58 (05/31/25 1631)  SpO2: 96 % (05/31/25 1631) Vital Signs (24h Range):  Temp:  [97.8 °F (36.6 °C)-98.6 °F (37 °C)] 98.4 °F (36.9 °C)  Pulse:  [61-72] 63  Resp:  [16-20] 16  SpO2:  [95 %-100 %] 96 %  BP: ()/(55-78) 124/58     Weight: 63.5 kg (140 lb)  Body mass index is 20.67 kg/m².    Intake/Output Summary (Last 24 hours) at 5/31/2025 1704  Last data filed at 5/31/2025 1308  Gross per 24 hour   Intake 3475.08 ml   Output --   Net 3475.08 ml         Physical Exam      Constitutional:       General: He is not in acute distress.     Appearance: Normal appearance. He is normal weight. He is not ill-appearing, toxic-appearing or diaphoretic.   HENT:      Head: Normocephalic and atraumatic.      Right Ear: External ear normal.      Left Ear: External ear normal.      Nose: Nose normal. No congestion or rhinorrhea.      Mouth/Throat:      Mouth: Mucous membranes are dry.      Pharynx: Oropharynx is clear. No oropharyngeal exudate or posterior oropharyngeal erythema.      Comments: Poor dentition  Eyes:      General: No scleral icterus.     Extraocular Movements: Extraocular movements intact.      Conjunctiva/sclera: Conjunctivae normal.      Pupils: Pupils are equal, round, and reactive to light.      Comments: Pallor noted   Neck:      Vascular: No carotid bruit.   Cardiovascular:      Rate and Rhythm: Normal rate and regular rhythm.      Pulses: Normal pulses.      Heart sounds: Normal heart sounds. No murmur heard.     No friction rub. No gallop.   Pulmonary:      Effort: Pulmonary effort is normal. No respiratory distress.      Breath sounds: No stridor. No wheezing, rhonchi or rales.      Comments: Right lung clear to auscultation throughout.  Left lung positive for expiratory wheezing and coarse breath sounds noted throughout greater in lower lobe  Chest:      Chest wall: No tenderness.   Abdominal:       General: Abdomen is flat. Bowel sounds are normal. There is no distension.      Palpations: Abdomen is soft. There is no mass.      Tenderness: There is no abdominal tenderness. There is no right CVA tenderness, left CVA tenderness, guarding or rebound.      Hernia: No hernia is present.   Musculoskeletal:         General: No swelling, tenderness, deformity or signs of injury. Normal range of motion.      Cervical back: Normal range of motion and neck supple. No rigidity or tenderness.      Right lower leg: No edema.      Left lower leg: No edema.   Lymphadenopathy:      Cervical: No cervical adenopathy.   Skin:     General: Skin is warm and dry.      Capillary Refill: Capillary refill takes less than 2 seconds.      Coloration: Skin is not jaundiced or pale.      Findings: No bruising, erythema, lesion or rash.   Neurological:      General: No focal deficit present.      Mental Status: He is alert and oriented to person, place, and time. Mental status is at baseline.      Cranial Nerves: No cranial nerve deficit.      Sensory: No sensory deficit.      Motor: No weakness.      Coordination: Coordination normal.   Psychiatric:         Mood and Affect: Mood normal.         Behavior: Behavior normal.         Thought Content: Thought content normal.         Judgment: Judgment normal.     Significant Labs: All pertinent labs within the past 24 hours have been reviewed.  CBC:   Recent Labs   Lab 05/30/25  1838 05/30/25  2116 05/31/25  0837   WBC 6.74  --  6.47   HGB 3.9* 5.0* 7.0*   HCT 15.6* 17.7* 23.9*     --  195     CMP:   Recent Labs   Lab 05/30/25  1747 05/31/25  0837   * 137   K 3.9 4.3    109   CO2 24 22*   GLU 97 76   BUN 22 16   CREATININE 0.9 0.8   CALCIUM 8.7 8.2*   PROT 6.2 5.8*   ALBUMIN 3.3* 3.0*   BILITOT 0.5 1.0   ALKPHOS 69 63   AST 12 13   ALT 11 7*   ANIONGAP 8 6*       Significant Imaging:   Imaging Results              CT Chest With Contrast (Final result)  Result time 05/31/25  08:31:57      Final result by Alfonso Smith MD (05/31/25 08:31:57)                   Impression:     Extensive left consolidation consistent with pneumonia.  Associated volume loss with shift of the mediastinum to the left.  Small left pleural effusion.    Finalized on: 5/31/2025 8:31 AM By:  Alfonso Smith MD  [facility]# [id number]      2025-05-31 08:34:02.126     [facility]               Narrative:    EXAM:  CT CHEST WITH CONTRAST    CLINICAL HISTORY:  Hemoptysis.    COMPARISON: None.    TECHNIQUE:  Standard contrast-enhanced CT scan of the chest performed with 75 mL Isovue-370.  All CT scans at [this location] are performed using dose modulation techniques as appropriate to a performed exam including the following: automated exposure control; adjustment of the mA and/or kV according to patient size (this includes techniques or standardized protocols for targeted exams where dose is matched to indication / reason for exam; i.e. extremities or head); use of iterative reconstruction technique.    FINDINGS:  There is extensive consolidation involving the left upper and to a greater extent left lower lobes.  There is associated volume loss with shift of the mediastinum to the left.    There is a small left pleural effusion.    The right lung is grossly clear with minor atelectasis in the lung base.    The cardiac size is normal.  There is moderate coronary artery calcification.    The great vessels appear normal.  The pulmonary arteries are mildly enlarged.  No large filling defect    In the upper abdomen no significant finding is appreciated.                                         X-Ray Chest AP Portable (Final result)  Result time 05/30/25 18:19:29      Final result by Jluis Wick Jr., MD (05/30/25 18:19:29)                   Impression:      1.  Pneumonia.    Finalized on: 5/30/2025 6:19 PM By:  Jluis Wick MD  [facility]# [id number]      2025-05-30 18:21:33.995     [facility]               Narrative:    EXAM: XR CHEST  AP PORTABLE    CLINICAL INDICATION:     Shortness of breath.    TECHNIQUE/FINDINGS: Left lower lobe and left midlung consolidation with small effusion.  Right lung is clear.  Multiple granulomas.  Cardiac silhouette and mediastinum within normal limits.                                           Assessment & Plan  Hemoptysis  Patient presented with persistent and worsening blood-tinged sputum/hemoptysis x3-6 months duration. Patient reported taking over-the-counter naproxen daily over the past few months but denies use of other antiplatelet/anticoagulation therapies, overseas or long-distance travel, TB exposure, experiencing any fever, chills, sweats, weight loss, epistaxis, hematemesis, abdominal pain, hematuria, hematochezia, or evidence of melena.  Patient reports smoking cigars occasionally as well as use of marijuana and cocaine. Initial workup in the ED revealed patient to be afebrile without leukocytosis, initially hypotensive with blood pressure measuring 88/58 which improved following emergent blood transfusion, saturating 100% on room air in no acute distress and without use of accessory muscles noted, H/H 3.9/15.6, , troponin negative, urine toxicology positive for cocaine and THC. Chest x-ray positive for left lower lobe and left mid lung consolidations with small effusions and addition to multiple granulomas with clear appearing right lung.  Patient s/p emergent 2u of unmatched blood and 1u crossmatch blood in the ED with additional 1 unit crossmatch blood pending transfusion.  Patient underwent CT chest for further evaluation and pending results.   Plan:  -NPO  -Telemetry  -Continue current pain regimen, titrate as needed  -Bowel regimen  -Bedrest  -IVFs prn  -Continue blood transfusion  -Hold antiplatelet/anticoagulation therapies  -Antiemetics prn  -Tylenol as needed for fever  -f/u CT chest and cultures  -Continue antibiotics   -f/u pulmonology --  per pulm-- ?  Aspiration component;   recommended antibiotics including anaerobic coverage,; Given chronicity and risk of atypical mycobacteria, recommended AFB; recommended Repeat imaging with CT in 4-6 weeks to ensure resolution; chest radiograph in 2-3 days to gauge improvement  Symptomatic anemia  Anemia is likely due to chronic blood loss. Most recent hemoglobin and hematocrit are listed below.  Recent Labs     05/30/25  1838 05/30/25  2116 05/31/25  0837   HGB 3.9* 5.0* 7.0*   HCT 15.6* 17.7* 23.9*     Plan  - Monitor serial CBC: Every 8 hours  - Transfuse PRBC if patient becomes hemodynamically unstable, symptomatic or H/H drops below 7/21.  - s/p 4 units of prbc transfusion  - Patient's anemia is currently improving  -Hold antiplatelet/anticoagulation therapies   -Continue blood transfusion    5/31  Hb 7      Pneumonia  Patient has a diagnosis of pneumonia. The cause of the pneumonia is suspected to be bacterial in etiology but organism is not known. The pneumonia is stable. The patient has the following signs/symptoms of pneumonia: cough, sputum production, shortness of breath, and chest pain. The patient does not have a current oxygen requirement and the patient does not have a home oxygen requirement. I have reviewed the pertinent imaging. The following cultures have been collected: Blood cultures and Sputum culture The culture results are listed below.     Current antimicrobial regimen consists of the antibiotics listed below. Will monitor patient closely and continue current treatment plan unchanged.    Antibiotics (From admission, onward)      Start     Stop Route Frequency Ordered    05/31/25 1530  metronidazole IVPB 500 mg         -- IV Every 8 hours (non-standard times) 05/31/25 1419    05/31/25 1000  azithromycin (ZITHROMAX) 500 mg in 0.9% NaCl 250 mL IVPB (admixture device)         06/05/25 0959 IV Every 24 hours (non-standard times) 05/31/25 0849    05/31/25 0945  cefTRIAXone injection 1 g         -- IV Every 24 hours (non-standard  times) 05/31/25 0844            Microbiology Results (last 7 days)       Procedure Component Value Units Date/Time    Culture, Respiratory with Gram Stain [[id number]] Collected: 05/31/25 1526    Order Status: Sent Specimen: Respiratory from Sputum, Expectorated Updated: 05/31/25 1555    AFB Culture & Smear [[id number]] Collected: 05/31/25 1526    Order Status: Sent Specimen: Sputum, Expectorated Updated: 05/31/25 1555    Blood culture [[id number]]  (Normal) Collected: 05/30/25 1705    Order Status: Completed Specimen: Blood from Peripheral, Antecubital, Left Updated: 05/31/25 1501     Blood Culture No Growth After 6 Hours    Blood Culture #2 **CANNOT BE ORDERED STAT** [[id number]]  (Normal) Collected: 05/30/25 2106    Order Status: Completed Specimen: Blood from Peripheral, Antecubital, Right Updated: 05/31/25 1501     Blood Culture No Growth After 6 Hours    Culture, Respiratory with Gram Stain [[id number]] Collected: 05/31/25 0939    Order Status: Sent Specimen: Respiratory from Sputum Updated: 05/31/25 0943          VTE Risk Mitigation (From admission, onward)           Ordered     Reason for No Pharmacological VTE Prophylaxis  Once        Question Answer Comment   Reasons: Active Bleeding    Reasons: Risk of Bleeding        05/31/25 0154     IP VTE LOW RISK PATIENT  Once         05/31/25 0154     Place sequential compression device  Until discontinued         05/31/25 0154                    Discharge Planning   ADEOLA:      Code Status: Full Code   Medical Readiness for Discharge Date:   Discharge Plan A: Home                        Breanne Goncalves MD  Department of Hospital Medicine   [facility]

## 2025-05-31 NOTE — H&P
[facility] Medicine  History & Physical    Patient Name: Shaan Tucker  MRN: [id number]  Patient Class: IP- Inpatient  Admission Date: 5/30/2025  Attending Physician: No att. providers found   Primary Care Provider: Karla Dennis FNP         Patient information was obtained from patient, past medical records, and ER records.     Subjective:     Principal Problem:Hemoptysis    Chief Complaint:   Chief Complaint   Patient presents with    Shortness of Breath     Pt brought in by EMS for SOB, worse on exertion, with blood tinged sputum x 3 months. Pt also reports dizziness when standing over the same time. Pt reports he has been unable to get to a doctor. -chest pain        HPI: Shaan Tucker is a 64 y.o. male with a PMH  has a past medical history of ADHD (attention deficit hyperactivity disorder), Autism, Fetal alcohol syndrome, and Seizures. who presented to the ED via EMS for further evaluation of persistent and worsening blood-tinged sputum x3 months duration.  Patient reported he has not seen a physician regarding this matter due to lack of transportation and reported calling EMS after experiencing extreme dizziness and inability to stand up.  Patient also reported taking over-the-counter naproxen daily over the past few months but denies use of other antiplatelet/anticoagulation therapies, and denied overseas or long-distance travel, TB exposure, experiencing any fever, chills, sweats, weight loss, epistaxis, hematemesis, abdominal pain, hematuria, hematochezia, or evidence of melena.  Associated symptoms also included worsening generalized weakness/fatigue, intermittent chest pain, and dyspnea/shortness of breath with exertion only.  He denies taking any prescribed medications, prior surgeries or blood transfusions, or experiencing similar symptoms previously.  Patient does report smoking cigars occasionally as well as smoking marijuana and doing cocaine.  He does not use inhalers, nebulizers,  home oxygen, or CPAP outpatient and reported being in his usual state of health prior to onset of symptoms.  All other review of systems negative except as noted above.  Initial workup in the ED revealed patient to be afebrile without leukocytosis, initially hypotensive with blood pressure measuring 88/58 which improved following emergent blood transfusion, saturating 100% on room air in no acute distress and without use of accessory muscles noted, H/H 3.9/15.6, , troponin negative, urine toxicology positive for cocaine and THC, and chest x-ray positive for left lower lobe and left mid lung consolidations with small effusions and addition to multiple granulomas with clear appearing right lung.  Patient received emergent 2u of unmatched blood and 1u crossmatch blood in the ED with additional 1 unit crossmatch blood pending transfusion.  Patient underwent CT chest for further evaluation and admitted to Hospital Medicine inpatient for continued medical management.      PCP: Karla Dennis      Past Medical History:   Diagnosis Date    ADHD (attention deficit hyperactivity disorder)     Autism     Fetal alcohol syndrome     Seizures        Past Surgical History:   Procedure Laterality Date    CYST REMOVAL         Review of patient's allergies indicates:   Allergen Reactions    Chantix [varenicline]      Seizures. Pt states that he can't remember if it was Chantix or Wellbutrin that he had seizures with.    Wellbutrin [bupropion hcl]      seizures       No current facility-administered medications on file prior to encounter.     Current Outpatient Medications on File Prior to Encounter   Medication Sig    multivitamin (THERAGRAN) per tablet Take 1 tablet by mouth once daily.    nicotine (NICODERM CQ) 21 mg/24 hr Place 1 patch onto the skin once daily.    nicotine, polacrilex, 2 mg lzmn Take 1 each (2 mg total) by mouth as needed (use no more than 6 lozenges in 24 hours while wearing a nicotine patch).     Family  History    None       Tobacco Use    Smoking status: Former     Current packs/day: 0.00     Average packs/day: 1 pack/day for 33.0 years (33.0 ttl pk-yrs)     Types: Cigarettes, Cigars     Start date: 1988     Quit date: 2021     Years since quittin.3    Smokeless tobacco: Never    Tobacco comments:     cigarillos   Substance and Sexual Activity    Alcohol use: Not on file    Drug use: Not on file    Sexual activity: Not on file     Review of Systems   All other systems reviewed and are negative.    Objective:     Vital Signs (Most Recent):  Temp: 98.3 °F (36.8 °C) (25 0040)  Pulse: 62 (25 0157)  Resp: 20 (25 004)  BP: 124/68 (25)  SpO2: 98 % (25) Vital Signs (24h Range):  Temp:  [97.8 °F (36.6 °C)-98.4 °F (36.9 °C)] 98.3 °F (36.8 °C)  Pulse:  [62-73] 62  Resp:  [16-20] 20  SpO2:  [97 %-100 %] 98 %  BP: ()/(52-78) 124/68     Weight: 59.3 kg (130 lb 11.7 oz)  Body mass index is 19.31 kg/m².     Physical Exam  Vitals reviewed.   Constitutional:       General: He is not in acute distress.     Appearance: Normal appearance. He is normal weight. He is not ill-appearing, toxic-appearing or diaphoretic.   HENT:      Head: Normocephalic and atraumatic.      Right Ear: External ear normal.      Left Ear: External ear normal.      Nose: Nose normal. No congestion or rhinorrhea.      Mouth/Throat:      Mouth: Mucous membranes are dry.      Pharynx: Oropharynx is clear. No oropharyngeal exudate or posterior oropharyngeal erythema.      Comments: Poor dentition  Eyes:      General: No scleral icterus.     Extraocular Movements: Extraocular movements intact.      Conjunctiva/sclera: Conjunctivae normal.      Pupils: Pupils are equal, round, and reactive to light.      Comments: Pallor noted   Neck:      Vascular: No carotid bruit.   Cardiovascular:      Rate and Rhythm: Normal rate and regular rhythm.      Pulses: Normal pulses.      Heart sounds: Normal heart sounds. No  murmur heard.     No friction rub. No gallop.   Pulmonary:      Effort: Pulmonary effort is normal. No respiratory distress.      Breath sounds: No stridor. No wheezing, rhonchi or rales.      Comments: Right lung clear to auscultation throughout.  Left lung positive for expiratory wheezing and coarse breath sounds noted throughout greater in lower lobe  Chest:      Chest wall: No tenderness.   Abdominal:      General: Abdomen is flat. Bowel sounds are normal. There is no distension.      Palpations: Abdomen is soft. There is no mass.      Tenderness: There is no abdominal tenderness. There is no right CVA tenderness, left CVA tenderness, guarding or rebound.      Hernia: No hernia is present.   Musculoskeletal:         General: No swelling, tenderness, deformity or signs of injury. Normal range of motion.      Cervical back: Normal range of motion and neck supple. No rigidity or tenderness.      Right lower leg: No edema.      Left lower leg: No edema.   Lymphadenopathy:      Cervical: No cervical adenopathy.   Skin:     General: Skin is warm and dry.      Capillary Refill: Capillary refill takes less than 2 seconds.      Coloration: Skin is not jaundiced or pale.      Findings: No bruising, erythema, lesion or rash.   Neurological:      General: No focal deficit present.      Mental Status: He is alert and oriented to person, place, and time. Mental status is at baseline.      Cranial Nerves: No cranial nerve deficit.      Sensory: No sensory deficit.      Motor: No weakness.      Coordination: Coordination normal.   Psychiatric:         Mood and Affect: Mood normal.         Behavior: Behavior normal.         Thought Content: Thought content normal.         Judgment: Judgment normal.              CRANIAL NERVES     CN III, IV, VI   Pupils are equal, round, and reactive to light.       Significant Labs: All pertinent labs within the past 24 hours have been reviewed.    Significant Imaging: I have reviewed all  pertinent imaging results/findings within the past 24 hours.    LABS:  Recent Results (from the past 24 hours)   Hepatitis C Antibody    Collection Time: 05/30/25  5:47 PM   Result Value Ref Range    Hep C Ab Interp Negative Negative   HCV Virus Hold Specimen    Collection Time: 05/30/25  5:47 PM   Result Value Ref Range    Extra Tube Hold for add-ons.    HIV 1/2 Ag/Ab (4th Gen)    Collection Time: 05/30/25  5:47 PM   Result Value Ref Range    HIV 1/2 Ag/Ab Negative Negative   Comprehensive metabolic panel    Collection Time: 05/30/25  5:47 PM   Result Value Ref Range    Sodium 135 (L) 136 - 145 mmol/L    Potassium 3.9 3.5 - 5.1 mmol/L    Chloride 103 95 - 110 mmol/L    CO2 24 23 - 29 mmol/L    Glucose 97 70 - 110 mg/dL    BUN 22 8 - 23 mg/dL    Creatinine 0.9 0.5 - 1.4 mg/dL    Calcium 8.7 8.7 - 10.5 mg/dL    Protein Total 6.2 6.0 - 8.4 gm/dL    Albumin 3.3 (L) 3.5 - 5.2 g/dL    Bilirubin Total 0.5 0.1 - 1.0 mg/dL    ALP 69 40 - 150 unit/L    AST 12 11 - 45 unit/L    ALT 11 10 - 44 unit/L    Anion Gap 8 8 - 16 mmol/L    eGFR >60 >60 mL/min/1.73/m2   Troponin I #1    Collection Time: 05/30/25  5:47 PM   Result Value Ref Range    Troponin-I <0.006 <=0.026 ng/mL   BNP    Collection Time: 05/30/25  5:47 PM   Result Value Ref Range     (H) 0 - 99 pg/mL   ABORH RETYPE    Collection Time: 05/30/25  5:47 PM   Result Value Ref Range    ABORH Retype A POS    CBC with Differential    Collection Time: 05/30/25  6:38 PM   Result Value Ref Range    WBC 6.74 3.90 - 12.70 K/uL    RBC 2.25 (L) 4.60 - 6.20 M/uL    HGB 3.9 (LL) 14.0 - 18.0 gm/dL    HCT 15.6 (LL) 40.0 - 54.0 %    MCV 69 (L) 82 - 98 fL    MCH 17.3 (L) 27.0 - 31.0 pg    MCHC 25.0 (L) 32.0 - 36.0 g/dL    RDW 21.0 (H) 11.5 - 14.5 %    Platelet Count 233 150 - 450 K/uL    MPV 9.2 9.2 - 12.9 fL    Nucleated RBC 0 <=0 /100 WBC    Neut % 74.6 (H) 38 - 73 %    Lymph % 16.5 (L) 18 - 48 %    Mono % 6.1 4 - 15 %    Eos % 2.4 <=8 %    Basophil % 0.1 <=1.9 %    Imm Grans %  0.3 0.0 - 0.5 %    Neut # 5.03 1.8 - 7.7 K/uL    Lymph # 1.11 1 - 4.8 K/uL    Mono # 0.41 0.3 - 1 K/uL    Eos # 0.16 <=0.5 K/uL    Baso # 0.01 <=0.2 K/uL    Imm Grans # 0.02 0.00 - 0.04 K/uL   Ethanol    Collection Time: 05/30/25  6:38 PM   Result Value Ref Range    Alcohol, Serum <10 <10 mg/dL   Prepare RBC 2 Units; hb: 4    Collection Time: 05/30/25  6:38 PM   Result Value Ref Range    UNIT NUMBER [id number]     UNIT ABO/RH A POS     DISPENSE STATUS Transfused     Unit Expiration [id number]     Product Code C4045Q01     Unit Blood Type Code 6200     CROSSMATCH INTERPRETATION Compatible     UNIT NUMBER [id number]     UNIT ABO/RH A POS     DISPENSE STATUS Selected     Unit Expiration [id number]     Product Code E9855U66     Unit Blood Type Code 6200     CROSSMATCH INTERPRETATION Compatible    Type & Screen    Collection Time: 05/30/25  6:38 PM   Result Value Ref Range    Specimen Outdate 06/02/2025 23:59     Group & Rh A POS     Indirect [place] NEG    Prepare RBC 2 Units; Emergency    Collection Time: 05/30/25  6:38 PM   Result Value Ref Range    UNIT NUMBER [id number]     UNIT ABO/RH O NEG     DISPENSE STATUS Transfused     Unit Expiration [id number]     Product Code U4173T87     Unit Blood Type Code 9500     CROSSMATCH INTERPRETATION Compatible     UNIT NUMBER [id number]     UNIT ABO/RH O NEG     DISPENSE STATUS Transfused     Unit Expiration [id number]     Product Code U4029W14     Unit Blood Type Code 9500     CROSSMATCH INTERPRETATION Compatible    Troponin I #2    Collection Time: 05/30/25  8:42 PM   Result Value Ref Range    Troponin-I <0.006 <=0.026 ng/mL   Hemoglobin    Collection Time: 05/30/25  9:16 PM   Result Value Ref Range    HGB 5.0 (LL) 14.0 - 18.0 gm/dL   Hematocrit    Collection Time: 05/30/25  9:16 PM   Result Value Ref Range    HCT 17.7 (LL) 40.0 - 54.0 %   Drug screen panel, in-house    Collection Time: 05/30/25  9:35 PM   Result Value Ref Range    Benzodiazepine, Urine  Negative Negative    Methadone, Urine Negative Negative    Cocaine, Urine Presumptive Positive (A) Negative    Opiates, Urine Negative Negative    Barbiturates, Urine Negative Negative    Amphetamines, Urine Negative Negative    THC Presumptive Positive (A) Negative    Phencyclidine, Urine Negative Negative    Urine Creatinine 98.4 23.0 - 375.0 mg/dL       RADIOLOGY  X-Ray Chest AP Portable  Result Date: 5/30/2025  EXAM: XR CHEST AP PORTABLE CLINICAL INDICATION:     Shortness of breath. TECHNIQUE/FINDINGS: Left lower lobe and left midlung consolidation with small effusion.  Right lung is clear.  Multiple granulomas.  Cardiac silhouette and mediastinum within normal limits.     1.  Pneumonia. Finalized on: 5/30/2025 6:19 PM By:  Jluis Wcik MD [facility]# [id number]      2025-05-30 18:21:33.995     [facility]      EKG    MICROBIOLOGY    MDM    Assessment/Plan:     Assessment & Plan  Hemoptysis  Patient presented with persistent and worsening blood-tinged sputum/hemoptysis x3-6 months duration. Patient reported taking over-the-counter naproxen daily over the past few months but denies use of other antiplatelet/anticoagulation therapies, overseas or long-distance travel, TB exposure, experiencing any fever, chills, sweats, weight loss, epistaxis, hematemesis, abdominal pain, hematuria, hematochezia, or evidence of melena.  Patient reports smoking cigars occasionally as well as use of marijuana and cocaine. Initial workup in the ED revealed patient to be afebrile without leukocytosis, initially hypotensive with blood pressure measuring 88/58 which improved following emergent blood transfusion, saturating 100% on room air in no acute distress and without use of accessory muscles noted, H/H 3.9/15.6, , troponin negative, urine toxicology positive for cocaine and THC. Chest x-ray positive for left lower lobe and left mid lung consolidations with small effusions and addition to multiple granulomas with clear appearing right  lung.  Patient s/p emergent 2u of unmatched blood and 1u crossmatch blood in the ED with additional 1 unit crossmatch blood pending transfusion.  Patient underwent CT chest for further evaluation and pending results.   Plan:  -NPO  -Telemetry  -Continue current pain regimen, titrate as needed  -Bowel regimen  -Bedrest  -IVFs prn  -Continue blood transfusion  -Hold antiplatelet/anticoagulation therapies  -Antiemetics prn  -Tylenol as needed for fever  -f/u CT chest and cultures  -Continue antibiotics   -f/u pulmonology in a.m.      Symptomatic anemia  Anemia is likely due to chronic blood loss. Most recent hemoglobin and hematocrit are listed below.  Recent Labs     05/30/25  1838 05/30/25  2116   HGB 3.9* 5.0*   HCT 15.6* 17.7*     Plan  - Monitor serial CBC: Every 8 hours  - Transfuse PRBC if patient becomes hemodynamically unstable, symptomatic or H/H drops below 7/21.  - Patient has received 3 units of PRBCs on admission (2 emergent and 1 crossmatched in the ED with additional 1u crossmatched pending transfusion  - Patient's anemia is currently improving  -Hold antiplatelet/anticoagulation therapies   -Continue blood transfusion  -Pulmonology consult in a.m.   -f/u CT Chest     Pneumonia  Patient has a diagnosis of pneumonia. The cause of the pneumonia is suspected to be bacterial in etiology but organism is not known. The pneumonia is stable. The patient has the following signs/symptoms of pneumonia: cough, sputum production, shortness of breath, and chest pain. The patient does not have a current oxygen requirement and the patient does not have a home oxygen requirement. I have reviewed the pertinent imaging. The following cultures have been collected: Blood cultures and Sputum culture The culture results are listed below.     Current antimicrobial regimen consists of the antibiotics listed below. Will monitor patient closely and continue current treatment plan unchanged.    Antibiotics (From admission, onward)       None            Microbiology Results (last 7 days)       Procedure Component Value Units Date/Time    Blood culture [[id number]] Collected: 05/30/25 1705    Order Status: Sent Specimen: Blood from Peripheral, Antecubital, Left Updated: 05/30/25 2119    Blood Culture #2 **CANNOT BE ORDERED STAT** [[id number]] Collected: 05/30/25 2106    Order Status: Sent Specimen: Blood from Peripheral, Antecubital, Right Updated: 05/30/25 2119            VTE Risk Mitigation (From admission, onward)           Ordered     Reason for No Pharmacological VTE Prophylaxis  Once        Question Answer Comment   Reasons: Active Bleeding    Reasons: Risk of Bleeding        05/31/25 0154     IP VTE LOW RISK PATIENT  Once         05/31/25 0154     Place sequential compression device  Until discontinued         05/31/25 0154                  //Core Measures   -DVT proph: SCDs, withholding anticoagulation in setting of hemoptysis   -Code status: Full    -Surrogate: spouse       Components of this note were documented using a voice recognition system and are subject to errors not corrected at the time the document was proof read. Please contact the author for any clarifications.        Maco Joya MD  Department of Hospital Medicine  [facility] - Med S[facility]

## 2025-06-01 PROBLEM — E43 SEVERE PROTEIN-CALORIE MALNUTRITION: Status: ACTIVE | Noted: 2025-06-01

## 2025-06-01 LAB
ABSOLUTE EOSINOPHIL (OHS): 0.38 K/UL
ABSOLUTE MONOCYTE (OHS): 0.5 K/UL (ref 0.3–1)
ABSOLUTE NEUTROPHIL COUNT (OHS): 5.43 K/UL (ref 1.8–7.7)
ALBUMIN SERPL BCP-MCNC: 3.2 G/DL (ref 3.5–5.2)
ALP SERPL-CCNC: 63 UNIT/L (ref 40–150)
ALT SERPL W/O P-5'-P-CCNC: 9 UNIT/L (ref 10–44)
ANION GAP (OHS): 10 MMOL/L (ref 8–16)
ANISOCYTOSIS BLD QL SMEAR: NORMAL
AST SERPL-CCNC: 16 UNIT/L (ref 11–45)
BASOPHILS # BLD AUTO: 0.03 K/UL
BASOPHILS NFR BLD AUTO: 0.4 %
BILIRUB SERPL-MCNC: 0.8 MG/DL (ref 0.1–1)
BUN SERPL-MCNC: 12 MG/DL (ref 8–23)
CALCIUM SERPL-MCNC: 8.3 MG/DL (ref 8.7–10.5)
CHLORIDE SERPL-SCNC: 106 MMOL/L (ref 95–110)
CO2 SERPL-SCNC: 17 MMOL/L (ref 23–29)
CREAT SERPL-MCNC: 0.7 MG/DL (ref 0.5–1.4)
ERYTHROCYTE [DISTWIDTH] IN BLOOD BY AUTOMATED COUNT: 22.4 % (ref 11.5–14.5)
GFR SERPLBLD CREATININE-BSD FMLA CKD-EPI: >60 ML/MIN/1.73/M2
GLUCOSE SERPL-MCNC: 78 MG/DL (ref 70–110)
HCT VFR BLD AUTO: 26.3 % (ref 40–54)
HGB BLD-MCNC: 7.7 GM/DL (ref 14–18)
HYPOCHROMIA BLD QL SMEAR: NORMAL
IMM GRANULOCYTES # BLD AUTO: 0.03 K/UL (ref 0–0.04)
IMM GRANULOCYTES NFR BLD AUTO: 0.4 % (ref 0–0.5)
LYMPHOCYTES # BLD AUTO: 1.15 K/UL (ref 1–4.8)
MCH RBC QN AUTO: 21.6 PG (ref 27–31)
MCHC RBC AUTO-ENTMCNC: 29.3 G/DL (ref 32–36)
MCV RBC AUTO: 74 FL (ref 82–98)
NUCLEATED RBC (/100WBC) (OHS): 0 /100 WBC
OHS QRS DURATION: 106 MS
OHS QTC CALCULATION: 433 MS
OVALOCYTES BLD QL SMEAR: NORMAL
PLATELET # BLD AUTO: 200 K/UL (ref 150–450)
PLATELET BLD QL SMEAR: NORMAL
PMV BLD AUTO: 9 FL (ref 9.2–12.9)
POIKILOCYTOSIS BLD QL SMEAR: NORMAL
POTASSIUM SERPL-SCNC: 3.8 MMOL/L (ref 3.5–5.1)
PROCALCITONIN SERPL-MCNC: 0.06 NG/ML
PROT SERPL-MCNC: 6 GM/DL (ref 6–8.4)
RBC # BLD AUTO: 3.56 M/UL (ref 4.6–6.2)
RELATIVE EOSINOPHIL (OHS): 5.1 %
RELATIVE LYMPHOCYTE (OHS): 15.3 % (ref 18–48)
RELATIVE MONOCYTE (OHS): 6.6 % (ref 4–15)
RELATIVE NEUTROPHIL (OHS): 72.2 % (ref 38–73)
SODIUM SERPL-SCNC: 133 MMOL/L (ref 136–145)
WBC # BLD AUTO: 7.52 K/UL (ref 3.9–12.7)

## 2025-06-01 PROCEDURE — 25000003 PHARM REV CODE 250: Performed by: STUDENT IN AN ORGANIZED HEALTH CARE EDUCATION/TRAINING PROGRAM

## 2025-06-01 PROCEDURE — 85025 COMPLETE CBC W/AUTO DIFF WBC: CPT | Performed by: HOSPITALIST

## 2025-06-01 PROCEDURE — 94799 UNLISTED PULMONARY SVC/PX: CPT

## 2025-06-01 PROCEDURE — 63600175 PHARM REV CODE 636 W HCPCS: Performed by: INTERNAL MEDICINE

## 2025-06-01 PROCEDURE — 84145 PROCALCITONIN (PCT): CPT | Performed by: STUDENT IN AN ORGANIZED HEALTH CARE EDUCATION/TRAINING PROGRAM

## 2025-06-01 PROCEDURE — 11000001 HC ACUTE MED/SURG PRIVATE ROOM

## 2025-06-01 PROCEDURE — 21400001 HC TELEMETRY ROOM

## 2025-06-01 PROCEDURE — 80053 COMPREHEN METABOLIC PANEL: CPT | Performed by: HOSPITALIST

## 2025-06-01 PROCEDURE — 36415 COLL VENOUS BLD VENIPUNCTURE: CPT | Performed by: STUDENT IN AN ORGANIZED HEALTH CARE EDUCATION/TRAINING PROGRAM

## 2025-06-01 PROCEDURE — 99900035 HC TECH TIME PER 15 MIN (STAT)

## 2025-06-01 PROCEDURE — 63600175 PHARM REV CODE 636 W HCPCS: Performed by: STUDENT IN AN ORGANIZED HEALTH CARE EDUCATION/TRAINING PROGRAM

## 2025-06-01 RX ORDER — TALC
6 POWDER (GRAM) TOPICAL NIGHTLY PRN
Status: DISCONTINUED | OUTPATIENT
Start: 2025-06-01 | End: 2025-06-04 | Stop reason: HOSPADM

## 2025-06-01 RX ORDER — CYANOCOBALAMIN 1000 UG/ML
1000 INJECTION, SOLUTION INTRAMUSCULAR; SUBCUTANEOUS
Status: DISCONTINUED | OUTPATIENT
Start: 2025-06-01 | End: 2025-06-04 | Stop reason: HOSPADM

## 2025-06-01 RX ORDER — SODIUM FERRIC GLUCONATE COMPLEX IN SUCROSE 12.5 MG/ML
125 INJECTION INTRAVENOUS DAILY
Status: DISCONTINUED | OUTPATIENT
Start: 2025-06-01 | End: 2025-06-03

## 2025-06-01 RX ORDER — FOLIC ACID 1 MG/1
1 TABLET ORAL DAILY
Status: DISCONTINUED | OUTPATIENT
Start: 2025-06-01 | End: 2025-06-04 | Stop reason: HOSPADM

## 2025-06-01 RX ADMIN — GUAIFENESIN AND DEXTROMETHORPHAN HYDROBROMIDE 1 TABLET: 600; 30 TABLET, EXTENDED RELEASE ORAL at 10:06

## 2025-06-01 RX ADMIN — FOLIC ACID 1 MG: 1 TABLET ORAL at 10:06

## 2025-06-01 RX ADMIN — AZITHROMYCIN MONOHYDRATE 500 MG: 500 INJECTION, POWDER, LYOPHILIZED, FOR SOLUTION INTRAVENOUS at 10:06

## 2025-06-01 RX ADMIN — SODIUM FERRIC GLUCONATE COMPLEX IN SUCROSE 125 MG: 12.5 INJECTION INTRAVENOUS at 10:06

## 2025-06-01 RX ADMIN — METRONIDAZOLE 500 MG: 5 INJECTION, SOLUTION INTRAVENOUS at 03:06

## 2025-06-01 RX ADMIN — CYANOCOBALAMIN 1000 MCG: 1000 INJECTION INTRAMUSCULAR; SUBCUTANEOUS at 10:06

## 2025-06-01 RX ADMIN — METRONIDAZOLE 500 MG: 5 INJECTION, SOLUTION INTRAVENOUS at 10:06

## 2025-06-01 RX ADMIN — METRONIDAZOLE 500 MG: 5 INJECTION, SOLUTION INTRAVENOUS at 06:06

## 2025-06-01 RX ADMIN — Medication 6 MG: at 10:06

## 2025-06-01 RX ADMIN — CEFTRIAXONE 1 G: 1 INJECTION, POWDER, FOR SOLUTION INTRAMUSCULAR; INTRAVENOUS at 10:06

## 2025-06-01 NOTE — ASSESSMENT & PLAN NOTE
Patient has a diagnosis of pneumonia. The cause of the pneumonia is suspected to be bacterial in etiology but organism is not known. The pneumonia is stable. The patient has the following signs/symptoms of pneumonia: cough, sputum production, shortness of breath, and chest pain. The patient does not have a current oxygen requirement and the patient does not have a home oxygen requirement. I have reviewed the pertinent imaging. The following cultures have been collected: Blood cultures and Sputum culture The culture results are listed below.     Current antimicrobial regimen consists of the antibiotics listed below. Will monitor patient closely and continue current treatment plan unchanged.    Antibiotics (From admission, onward)      Start     Stop Route Frequency Ordered    05/31/25 1530  metronidazole IVPB 500 mg         -- IV Every 8 hours (non-standard times) 05/31/25 1419    05/31/25 1000  azithromycin (ZITHROMAX) 500 mg in 0.9% NaCl 250 mL IVPB (admixture device)         06/05/25 0959 IV Every 24 hours (non-standard times) 05/31/25 0849    05/31/25 0945  cefTRIAXone injection 1 g         -- IV Every 24 hours (non-standard times) 05/31/25 0844            Microbiology Results (last 7 days)       Procedure Component Value Units Date/Time    Culture, Respiratory with Gram Stain [[id number]] Collected: 05/31/25 1526    Order Status: Completed Specimen: Respiratory from Sputum, Expectorated Updated: 06/01/25 1014     GRAM STAIN <10 Epithelial Cells/LPF      Moderate WBC seen      Rare Gram positive cocci    Blood culture [[id number]]  (Normal) Collected: 05/30/25 1705    Order Status: Completed Specimen: Blood from Peripheral, Antecubital, Left Updated: 06/01/25 0900     Blood Culture No Growth After 24 Hours    Blood Culture #2 **CANNOT BE ORDERED STAT** [[id number]]  (Normal) Collected: 05/30/25 2106    Order Status: Completed Specimen: Blood from Peripheral, Antecubital, Right Updated: 06/01/25 0900     Blood  Culture No Growth After 24 Hours    Culture, Respiratory with Gram Stain [[id number]] Collected: 05/31/25 0939    Order Status: Completed Specimen: Respiratory from Sputum Updated: 06/01/25 0402     GRAM STAIN <10 Epithelial Cells/LPF      Rare WBC seen      Few Gram positive cocci    Afb Culture Stain [[id number]] Collected: 05/31/25 1526    Order Status: Sent Specimen: Sputum, Expectorated Updated: 06/01/25 0056    AFB Culture & Smear [[id number]] Collected: 05/31/25 1526    Order Status: Sent Specimen: Sputum, Expectorated Updated: 05/31/25 1555

## 2025-06-01 NOTE — ASSESSMENT & PLAN NOTE
Nutrition consulted. Most recent weight and BMI monitored-     Measurements:  Wt Readings from Last 1 Encounters:   06/01/25 63.5 kg (139 lb 15.9 oz)   Body mass index is 20.67 kg/m².    Patient has been screened and assessed by RD.    Malnutrition Type:  Context: social/environmental circumstances  Level: severe    Malnutrition Characteristic Summary:  Energy Intake (Malnutrition): less than or equal to 75% for greater than or equal to 1 month  Subcutaneous Fat (Malnutrition): severe depletion  Muscle Mass (Malnutrition): severe depletion  Hand  Strength, Left (Malnutrition): decreased  Hand  Strength, Right (Malnutrition): decreased    Interventions/Recommendations (treatment strategy):  1. Recommend a Regular diet 2. Recommendd Boost plus TID to assist filling nutritional gaps 3. Recommend a daily multivitamin 4. Encourage PO and supplement intake, recommend feeding assistance as warranted 5. Weigh twice weekly

## 2025-06-01 NOTE — PROGRESS NOTES
[facility] Medicine  Progress Note    Patient Name: Shaan Tucker  MRN: [id number]  Patient Class: IP- Inpatient   Admission Date: 5/30/2025  Length of Stay: 2 days  Attending Physician: Breanne Goncalves,*  Primary Care Provider: Karla Dennis FNP        Subjective     Principal Problem:Hemoptysis        HPI:  Shaan Tucker is a 64 y.o. male with a PMH  has a past medical history of ADHD (attention deficit hyperactivity disorder), Autism, Fetal alcohol syndrome, and Seizures. who presented to the ED via EMS for further evaluation of persistent and worsening blood-tinged sputum x3 months duration.  Patient reported he has not seen a physician regarding this matter due to lack of transportation and reported calling EMS after experiencing extreme dizziness and inability to stand up.  Patient also reported taking over-the-counter naproxen daily over the past few months but denies use of other antiplatelet/anticoagulation therapies, and denied overseas or long-distance travel, TB exposure, experiencing any fever, chills, sweats, weight loss, epistaxis, hematemesis, abdominal pain, hematuria, hematochezia, or evidence of melena.  Associated symptoms also included worsening generalized weakness/fatigue, intermittent chest pain, and dyspnea/shortness of breath with exertion only.  He denies taking any prescribed medications, prior surgeries or blood transfusions, or experiencing similar symptoms previously.  Patient does report smoking cigars occasionally as well as smoking marijuana and doing cocaine.  He does not use inhalers, nebulizers, home oxygen, or CPAP outpatient and reported being in his usual state of health prior to onset of symptoms.  All other review of systems negative except as noted above.  Initial workup in the ED revealed patient to be afebrile without leukocytosis, initially hypotensive with blood pressure measuring 88/58 which improved following emergent blood transfusion,  saturating 100% on room air in no acute distress and without use of accessory muscles noted, H/H 3.9/15.6, , troponin negative, urine toxicology positive for cocaine and THC, and chest x-ray positive for left lower lobe and left mid lung consolidations with small effusions and addition to multiple granulomas with clear appearing right lung.  Patient received emergent 2u of unmatched blood and 1u crossmatch blood in the ED with additional 1 unit crossmatch blood pending transfusion.  Patient underwent CT chest for further evaluation and admitted to Hospital Medicine inpatient for continued medical management.      PCP: Karla Dennis      Overview/Hospital Course:  Admitted under Hospital Medicine with hemoptysis (ongoing for over past 3-4 months, did not follow up with PCP/pulmonology outpatient so far), severe anemia, weight loss, pneumonia   CT chest showed- Extensive left consolidation consistent with pneumonia. Associated volume loss with shift of the mediastinum to the left. Small left pleural effusion.   History of noncompliance with outpatient follow-up visits, medications   Active smoking, smoking over past 30 years   Weight loss of approximately 40 lb over past 3 months   Severe anemia on arrival, status post 4 units of PRBC transfusion on 05/30  Pulmonology on board- per pulm--likely ?Aspiration component;  recommended antibiotics including anaerobic coverage,; Given chronicity and risk of atypical mycobacteria, recommended AFB; recommended Repeat imaging with CT in 4-6 weeks to ensure resolution; chest radiograph in 2-3 days to gauge improvement    Dispo- pending clinical improvement, pulmonology clearance    Interval History:     No acute events overnight   Resting comfortably   Stated improvement in cough, hemoptysis   Hemoglobin slightly trended up to 7.7   Pending cultures, pulmonary clearance       Review of Systems  Objective:     Vital Signs (Most Recent):  Temp: 98.2 °F (36.8 °C) (06/01/25  1538)  Pulse: 65 (06/01/25 1538)  Resp: 18 (06/01/25 1538)  BP: 105/66 (06/01/25 1538)  SpO2: 97 % (06/01/25 1538) Vital Signs (24h Range):  Temp:  [98.1 °F (36.7 °C)-98.8 °F (37.1 °C)] 98.2 °F (36.8 °C)  Pulse:  [58-70] 65  Resp:  [16-20] 18  SpO2:  [96 %-98 %] 97 %  BP: (105-131)/(58-89) 105/66     Weight: 63.5 kg (139 lb 15.9 oz)  Body mass index is 20.67 kg/m².    Intake/Output Summary (Last 24 hours) at 6/1/2025 1552  Last data filed at 6/1/2025 0647  Gross per 24 hour   Intake 457.28 ml   Output 2 ml   Net 455.28 ml         Physical Exam      Constitutional:       General: He is not in acute distress.     Appearance: Normal appearance. He is normal weight. He is not ill-appearing, toxic-appearing or diaphoretic.   HENT:      Head: Normocephalic and atraumatic.      Right Ear: External ear normal.      Left Ear: External ear normal.      Nose: Nose normal. No congestion or rhinorrhea.      Mouth/Throat:      Mouth: Mucous membranes are dry.      Pharynx: Oropharynx is clear. No oropharyngeal exudate or posterior oropharyngeal erythema.      Comments: Poor dentition  Eyes:      General: No scleral icterus.     Extraocular Movements: Extraocular movements intact.      Conjunctiva/sclera: Conjunctivae normal.      Pupils: Pupils are equal, round, and reactive to light.      Comments: Pallor noted   Neck:      Vascular: No carotid bruit.   Cardiovascular:      Rate and Rhythm: Normal rate and regular rhythm.      Pulses: Normal pulses.      Heart sounds: Normal heart sounds. No murmur heard.     No friction rub. No gallop.   Pulmonary:      Effort: Pulmonary effort is normal. No respiratory distress.      Breath sounds: No stridor. No wheezing, rhonchi or rales.      Comments: Right lung clear to auscultation throughout.  Left lung positive for expiratory wheezing and coarse breath sounds noted throughout greater in lower lobe  Chest:      Chest wall: No tenderness.   Abdominal:      General: Abdomen is flat. Bowel  sounds are normal. There is no distension.      Palpations: Abdomen is soft. There is no mass.      Tenderness: There is no abdominal tenderness. There is no right CVA tenderness, left CVA tenderness, guarding or rebound.      Hernia: No hernia is present.   Musculoskeletal:         General: No swelling, tenderness, deformity or signs of injury. Normal range of motion.      Cervical back: Normal range of motion and neck supple. No rigidity or tenderness.      Right lower leg: No edema.      Left lower leg: No edema.   Lymphadenopathy:      Cervical: No cervical adenopathy.   Skin:     General: Skin is warm and dry.      Capillary Refill: Capillary refill takes less than 2 seconds.      Coloration: Skin is not jaundiced or pale.      Findings: No bruising, erythema, lesion or rash.   Neurological:      General: No focal deficit present.      Mental Status: He is alert and oriented to person, place, and time. Mental status is at baseline.      Cranial Nerves: No cranial nerve deficit.      Sensory: No sensory deficit.      Motor: No weakness.      Coordination: Coordination normal.   Psychiatric:         Mood and Affect: Mood normal.         Behavior: Behavior normal.         Thought Content: Thought content normal.         Judgment: Judgment normal.        Significant Labs: All pertinent labs within the past 24 hours have been reviewed.  CBC:   Recent Labs   Lab 05/30/25  1838 05/30/25  2116 05/31/25  0837 06/01/25  0639   WBC 6.74  --  6.47 7.52   HGB 3.9* 5.0* 7.0* 7.7*   HCT 15.6* 17.7* 23.9* 26.3*     --  195 200     CMP:   Recent Labs   Lab 05/30/25  1747 05/31/25  0837 06/01/25  0639   * 137 133*   K 3.9 4.3 3.8    109 106   CO2 24 22* 17*   GLU 97 76 78   BUN 22 16 12   CREATININE 0.9 0.8 0.7   CALCIUM 8.7 8.2* 8.3*   PROT 6.2 5.8* 6.0   ALBUMIN 3.3* 3.0* 3.2*   BILITOT 0.5 1.0 0.8   ALKPHOS 69 63 63   AST 12 13 16   ALT 11 7* 9*   ANIONGAP 8 6* 10       Significant Imaging:   Imaging  Results              CT Chest With Contrast (Final result)  Result time 05/31/25 08:31:57      Final result by Alfonso Smith MD (05/31/25 08:31:57)                   Impression:     Extensive left consolidation consistent with pneumonia.  Associated volume loss with shift of the mediastinum to the left.  Small left pleural effusion.    Finalized on: 5/31/2025 8:31 AM By:  Alfonso Smith MD  [facility]# [id number]      2025-05-31 08:34:02.126     [facility]               Narrative:    EXAM:  CT CHEST WITH CONTRAST    CLINICAL HISTORY:  Hemoptysis.    COMPARISON: None.    TECHNIQUE:  Standard contrast-enhanced CT scan of the chest performed with 75 mL Isovue-370.  All CT scans at [this location] are performed using dose modulation techniques as appropriate to a performed exam including the following: automated exposure control; adjustment of the mA and/or kV according to patient size (this includes techniques or standardized protocols for targeted exams where dose is matched to indication / reason for exam; i.e. extremities or head); use of iterative reconstruction technique.    FINDINGS:  There is extensive consolidation involving the left upper and to a greater extent left lower lobes.  There is associated volume loss with shift of the mediastinum to the left.    There is a small left pleural effusion.    The right lung is grossly clear with minor atelectasis in the lung base.    The cardiac size is normal.  There is moderate coronary artery calcification.    The great vessels appear normal.  The pulmonary arteries are mildly enlarged.  No large filling defect    In the upper abdomen no significant finding is appreciated.                                         X-Ray Chest AP Portable (Final result)  Result time 05/30/25 18:19:29      Final result by Jluis Wick Jr., MD (05/30/25 18:19:29)                   Impression:      1.  Pneumonia.    Finalized on: 5/30/2025 6:19 PM By:  Jluis Wick MD  [facility]# [id number]       2025-05-30 18:21:33.995     [facility]               Narrative:    EXAM: XR CHEST AP PORTABLE    CLINICAL INDICATION:     Shortness of breath.    TECHNIQUE/FINDINGS: Left lower lobe and left midlung consolidation with small effusion.  Right lung is clear.  Multiple granulomas.  Cardiac silhouette and mediastinum within normal limits.                                           Assessment & Plan  Hemoptysis  Patient presented with persistent and worsening blood-tinged sputum/hemoptysis x3-6 months duration. Patient reported taking over-the-counter naproxen daily over the past few months but denies use of other antiplatelet/anticoagulation therapies, overseas or long-distance travel, TB exposure, experiencing any fever, chills, sweats, weight loss, epistaxis, hematemesis, abdominal pain, hematuria, hematochezia, or evidence of melena.  Patient reports smoking cigars occasionally as well as use of marijuana and cocaine. Initial workup in the ED revealed patient to be afebrile without leukocytosis, initially hypotensive with blood pressure measuring 88/58 which improved following emergent blood transfusion, saturating 100% on room air in no acute distress and without use of accessory muscles noted, H/H 3.9/15.6, , troponin negative, urine toxicology positive for cocaine and THC. Chest x-ray positive for left lower lobe and left mid lung consolidations with small effusions and addition to multiple granulomas with clear appearing right lung.  Patient s/p emergent 2u of unmatched blood and 1u crossmatch blood in the ED with additional 1 unit crossmatch blood pending transfusion.  Patient underwent CT chest for further evaluation and pending results.   Plan:  -NPO  -Telemetry  -Continue current pain regimen, titrate as needed  -Bowel regimen  -Bedrest  -IVFs prn  -Continue blood transfusion  -Hold antiplatelet/anticoagulation therapies  -Antiemetics prn  -Tylenol as needed for fever  -f/u CT chest and  cultures  -Continue antibiotics   -f/u pulmonology --  per pulm-- ?  Aspiration component;  recommended antibiotics including anaerobic coverage,; Given chronicity and risk of atypical mycobacteria, recommended AFB; recommended Repeat imaging with CT in 4-6 weeks to ensure resolution; chest radiograph in 2-3 days to gauge improvement  Symptomatic anemia  Anemia is likely due to chronic blood loss. Most recent hemoglobin and hematocrit are listed below.  Recent Labs     05/30/25  2116 05/31/25  0837 06/01/25  0639   HGB 5.0* 7.0* 7.7*   HCT 17.7* 23.9* 26.3*     Plan  - Monitor serial CBC: Every 8 hours  - Transfuse PRBC if patient becomes hemodynamically unstable, symptomatic or H/H drops below 7/21.  - s/p 4 units of prbc transfusion  - Patient's anemia is currently improving  -Hold antiplatelet/anticoagulation therapies   -Continue blood transfusion    6/1  Hb gradually trending up       Pneumonia  Patient has a diagnosis of pneumonia. The cause of the pneumonia is suspected to be bacterial in etiology but organism is not known. The pneumonia is stable. The patient has the following signs/symptoms of pneumonia: cough, sputum production, shortness of breath, and chest pain. The patient does not have a current oxygen requirement and the patient does not have a home oxygen requirement. I have reviewed the pertinent imaging. The following cultures have been collected: Blood cultures and Sputum culture The culture results are listed below.     Current antimicrobial regimen consists of the antibiotics listed below. Will monitor patient closely and continue current treatment plan unchanged.    Antibiotics (From admission, onward)      Start     Stop Route Frequency Ordered    05/31/25 1530  metronidazole IVPB 500 mg         -- IV Every 8 hours (non-standard times) 05/31/25 1419    05/31/25 1000  azithromycin (ZITHROMAX) 500 mg in 0.9% NaCl 250 mL IVPB (admixture device)         06/05/25 0959 IV Every 24 hours  (non-standard times) 05/31/25 0849    05/31/25 0945  cefTRIAXone injection 1 g         -- IV Every 24 hours (non-standard times) 05/31/25 0844            Microbiology Results (last 7 days)       Procedure Component Value Units Date/Time    Culture, Respiratory with Gram Stain [[id number]] Collected: 05/31/25 1526    Order Status: Completed Specimen: Respiratory from Sputum, Expectorated Updated: 06/01/25 1014     GRAM STAIN <10 Epithelial Cells/LPF      Moderate WBC seen      Rare Gram positive cocci    Blood culture [[id number]]  (Normal) Collected: 05/30/25 1705    Order Status: Completed Specimen: Blood from Peripheral, Antecubital, Left Updated: 06/01/25 0900     Blood Culture No Growth After 24 Hours    Blood Culture #2 **CANNOT BE ORDERED STAT** [[id number]]  (Normal) Collected: 05/30/25 2106    Order Status: Completed Specimen: Blood from Peripheral, Antecubital, Right Updated: 06/01/25 0900     Blood Culture No Growth After 24 Hours    Culture, Respiratory with Gram Stain [[id number]] Collected: 05/31/25 0939    Order Status: Completed Specimen: Respiratory from Sputum Updated: 06/01/25 0402     GRAM STAIN <10 Epithelial Cells/LPF      Rare WBC seen      Few Gram positive cocci    Afb Culture Stain [[id number]] Collected: 05/31/25 1526    Order Status: Sent Specimen: Sputum, Expectorated Updated: 06/01/25 0056    AFB Culture & Smear [[id number]] Collected: 05/31/25 1526    Order Status: Sent Specimen: Sputum, Expectorated Updated: 05/31/25 1555          Severe protein-calorie malnutrition  Nutrition consulted. Most recent weight and BMI monitored-     Measurements:  Wt Readings from Last 1 Encounters:   06/01/25 63.5 kg (139 lb 15.9 oz)   Body mass index is 20.67 kg/m².    Patient has been screened and assessed by RD.    Malnutrition Type:  Context: social/environmental circumstances  Level: severe    Malnutrition Characteristic Summary:  Energy Intake (Malnutrition): less than or equal to 75% for greater  than or equal to 1 month  Subcutaneous Fat (Malnutrition): severe depletion  Muscle Mass (Malnutrition): severe depletion  Hand  Strength, Left (Malnutrition): decreased  Hand  Strength, Right (Malnutrition): decreased    Interventions/Recommendations (treatment strategy):  1. Recommend a Regular diet 2. Recommendd Boost plus TID to assist filling nutritional gaps 3. Recommend a daily multivitamin 4. Encourage PO and supplement intake, recommend feeding assistance as warranted 5. Weigh twice weekly    VTE Risk Mitigation (From admission, onward)           Ordered     Reason for No Pharmacological VTE Prophylaxis  Once        Question Answer Comment   Reasons: Active Bleeding    Reasons: Risk of Bleeding        05/31/25 0154     IP VTE LOW RISK PATIENT  Once         05/31/25 0154     Place sequential compression device  Until discontinued         05/31/25 0154                    Discharge Planning   ADEOLA:      Code Status: Full Code   Medical Readiness for Discharge Date:   Discharge Plan A: Home                        Breanne Goncalves MD  Department of Hospital Medicine   [facility] - Med Surg

## 2025-06-01 NOTE — PLAN OF CARE
Nutrition Recommendations/Interventions 6/1/25:   1. Recommend a Regular diet   2. Recommendd Boost plus TID to assist filling nutritional gaps   3. Recommend a daily multivitamin   4. Encourage PO and supplement intake, recommend feeding assistance as warranted   5. Recommend referral to  and/or   6. Weigh twice weekly  7. Collaboration by nutrition professional with other providers    Goals:   1. Pt will tolerate and consume >75% EEN and EPN prior to RD follow up   2. Pt will receive a daily multivitamin prior to RD follow up  3. Pt will receive a referral to  and/or  prior to RD follow up     PILAR Saunders, RDN, LDN

## 2025-06-01 NOTE — PROGRESS NOTES
[facility] - Med Surg  Adult Nutrition  Progress Note    SUMMARY       Recommendations    Recommendation/Intervention:   1. Recommend a Regular diet   2. Recommendd Boost plus TID to assist filling nutritional gaps   3. Recommend a daily multivitamin   4. Encourage PO and supplement intake, recommend feeding assistance as warranted   5. Recommend referral to  and/or   6. Weigh twice weekly    Goals:   1. Pt will tolerate and consume >75% EEN and EPN prior to RD follow up   2. Pt will receive a daily multivitamin prior to RD follow up  3. Pt will receive a referral to  and/or  prior to RD follow up   Nutrition Goal Status: new  Communication of RD Recs: other (comment) (RD notes, POC, sticky notes)    Nutrition Discharge Planning    Nutrition Discharge Planning: General healthy diet, Oral supplement regimen (comments)  Oral supplement regimen (comments): a daily multivitamin, Boost plus as warranted    Assessment and Plan    Endocrine  Severe protein-calorie malnutrition  Malnutrition Type:  Context: social/environmental circumstances  Level: severe    Related to (etiology):   Disordered eating pattern    Signs and Symptoms (as evidenced by):   BMI < 23 (older adult)  Underweight with loss of fat and muscle  Change in functional indicators ( strength)  Unable to eat sufficient energy/protein to maintain a healthy weigh      Malnutrition Characteristic Summary:  Energy Intake (Malnutrition): less than or equal to 75% for greater than or equal to 1 month  Subcutaneous Fat (Malnutrition): severe depletion  Muscle Mass (Malnutrition): severe depletion  Hand  Strength, Left (Malnutrition): decreased  Hand  Strength, Right (Malnutrition): decreased    Interventions/Recommendations (treatment strategy):  1. General diet  2. Commercial beverage medical food supplement therapy  3. Multivitamin mineral supplement therapy  4. Feeding assistance management  5.  "Collaboration by nutrition professional with other providers    Nutrition Diagnosis Status:   New         Malnutrition Assessment (5/31/25):  Malnutrition Context: social/environmental circumstances  Malnutrition Level: severe  Skin (Micronutrient): none (Jalil score = 19 (no risk)       Energy Intake (Malnutrition): less than or equal to 75% for greater than or equal to 1 month  Subcutaneous Fat (Malnutrition): severe depletion  Muscle Mass (Malnutrition): severe depletion  Hand  Strength, Left (Malnutrition): decreased  Hand  Strength, Right (Malnutrition): decreased   Orbital Region (Subcutaneous Fat Loss): severe depletion  Upper Arm Region (Subcutaneous Fat Loss): severe depletion   Shinto Region (Muscle Loss): severe depletion  Clavicle Bone Region (Muscle Loss): severe depletion  Clavicle and Acromion Bone Region (Muscle Loss): severe depletion  Dorsal Hand (Muscle Loss): severe depletion  Patellar Region (Muscle Loss): severe depletion  Anterior Thigh Region (Muscle Loss): severe depletion  Posterior Calf Region (Muscle Loss): severe depletion                 Reason for Assessment    Reason For Assessment: identified at risk by screening criteria  Diagnosis:  (Hemoptysis)  General Information Comments:   6/1/25: 64 y.o. Male admitted for Hemoptysis. PMH: Symptomatic anemia, Pneumonia; Hx: Seizures, Autism, Fetal alcohol syndrome, ADHD. Pt is currently on a Heart healthy diet. RD assessing pt d/t identified at risk per MST, score of 3 for unsure wt loss and decreased appetite. Pt is currently on a Heart healthy diet + Boost plus TID, RD visisted pt at bedside yesterday (5/31).Pt reported he had a good appetite PTA but unable to afford food d/t now retired and his food stamps were "cut in half", stated last 5 days of the month him and his wife are out of food, was eating 1 meal a day every 2-3 days x a couple of months, confirmed he is consuming 100% PO intake of meals since admit. RD provided pt with " "a menu to help confirmed pt preferred food choices, discussed protein/calorie benefit sof Boost plus, pt receptive to try, encouraged PO intake and ONS as snacks. Pt denied N/V/D, abd oain or chewing/swallowing difficulties, stated his UBW is 156-157 lbs, noticed wt loss and decreased hand strength, NFPE performed, severe malnutrition noted. Lbas, meds, weights reviewed. Weight charted 25 140 lbs (BMI 20.67, Underweight for age), no previous weights charted, -16 lb wt loss (10% wt change) for UBW. RD will continue to follow and monitor pt's notritional status during admit.    Nutrition/Diet History    Spiritual, Cultural Beliefs, Quaker Practices, Values that Affect Care: no  Food Allergies: NKFA  Factors Affecting Nutritional Intake: socio-economic    Nutrition Related Social Determinants of Health: SDOH: Lack of adequate food., Food insecurity., Unable to afford healthy foods, and Other: would benefit from referral to /     Anthropometrics    Height: 5' 9" (175.3 cm)  Height (inches): 69 in  Height Method: Stated  Weight: 63.5 kg (139 lb 15.9 oz)  Weight (lb): 139.99 lb  Weight Method: Bed Scale  Ideal Body Weight (IBW), Male: 160 lb  % Ideal Body Weight, Male (lb): 87.49 %  % Ideal Body Weight Malnutrition: 80-90% - mild deficit  BMI (Calculated): 20.7  BMI Grade: less than 23 (older than 65 years) - underweight  Usual Body Weight (UBW), k.9 kg  Weight Change Amount: 16 lb  % Usual Body Weight: 89.75  % Weight Change From Usual Weight: -10.44 %       Wt Readings from Last 15 Encounters:   25 63.5 kg (139 lb 15.9 oz)     Lab/Procedures/Meds    Pertinent Labs Reviewed: reviewed  Pertinent Medications Reviewed: reviewed    BMP  Lab Results   Component Value Date     (L) 2025    K 3.8 2025     2025    CO2 17 (L) 2025    BUN 12 2025    CREATININE 0.7 2025    CALCIUM 8.3 (L) 2025    ANIONGAP 10 2025    EGFRNORACEVR " ">60 06/01/2025     Lab Results   Component Value Date    CALCIUM 8.3 (L) 06/01/2025     Lab Results   Component Value Date    ALBUMIN 3.2 (L) 06/01/2025     Lab Results   Component Value Date    ALT 9 (L) 06/01/2025    AST 16 06/01/2025    ALKPHOS 63 06/01/2025    BILITOT 0.8 06/01/2025     No results for input(s): "POCTGLUCOSE" in the last 24 hours.    No results found for: "LABA1C", "HGBA1C"    Lab Results   Component Value Date    WBC 7.52 06/01/2025    HGB 7.7 (L) 06/01/2025    HCT 26.3 (L) 06/01/2025    MCV 74 (L) 06/01/2025     06/01/2025       Scheduled Meds:   azithromycin  500 mg Intravenous Q24H    cefTRIAXone (Rocephin) IV (PEDS and ADULTS)  1 g Intravenous Q24H    cyanocobalamin  1,000 mcg Intramuscular Q30 Days    ferric gluconate  125 mg Intravenous Daily    folic acid  1 mg Oral Daily    metroNIDAZOLE IV (PEDS and ADULTS)  500 mg Intravenous Q8H     Continuous Infusions:  PRN Meds:.  Current Facility-Administered Medications:     0.9%  NaCl infusion (for blood administration), , Intravenous, Q24H PRN    0.9%  NaCl infusion (for blood administration), , Intravenous, Q24H PRN    acetaminophen, 650 mg, Rectal, Q6H PRN    albuterol-ipratropium, 3 mL, Nebulization, Q6H PRN    dextrose 50%, 12.5 g, Intravenous, PRN    dextrose 50%, 25 g, Intravenous, PRN    glucagon (human recombinant), 1 mg, Intramuscular, PRN    glucose, 16 g, Oral, PRN    glucose, 24 g, Oral, PRN    morphine, 2 mg, Intravenous, Q4H PRN    naloxone, 0.02 mg, Intravenous, PRN    ondansetron, 4 mg, Intravenous, Q8H PRN    sodium chloride 0.9%, 10 mL, Intravenous, Q12H PRN      Estimated/Assessed Needs    Weight Used For Calorie Calculations: 63.5 kg (139 lb 15.9 oz)  Energy Calorie Requirements (kcal): 6026-6683 kcals (30-35 kcals/kg ABW (Underweight for age)  Energy Need Method: Kcal/kg  Protein Requirements: 76-95 g (1.2-1.5 g/kg ABW (Underweight)  Weight Used For Protein Calculations: 63.5 kg (139 lb 15.9 oz)  Fluid Requirements " (mL): 4645-1953 mL (1 mL/kcal)  Estimated Fluid Requirement Method: RDA Method  RDA Method (mL): 1905  CHO Requirement: 238-278 g (7265-6584 kcals/8)      Nutrition Prescription Ordered    Current Diet Order: Heart healthy diet  Oral Nutrition Supplement: Boost plus TID    Evaluation of Received Nutrient/Fluid Intake  I/O: (Net since admit):  6/1/25:+3930.4 mL    Energy Calories Required: meeting needs  Protein Required: meeting needs  Fluid Required: not meeting needs  Total Fluid Intake (mL): 937.3  Comments: LBM 5/31  Tolerance: tolerating  % Intake of Estimated Energy Needs: 75 - 100 %  % Meal Intake: 75 - 100 %    PES Statement  See malnutrition above    Nutrition Risk    Level of Risk/Frequency of Follow-up: high (F/u x 2 weekly)     Monitor and Evaluation    Monitor and Evaluation: Energy intake, Food and beverage intake, Protein intake, Diet order, Weight, Electrolyte and renal panel, Gastrointestinal profile, Glucose/endocrine profile, Nutrition focused physical findings     Nutrition Follow-Up    RD Follow-up?: Yes  Juli Portillo, BS, RDN, LDN

## 2025-06-01 NOTE — PLAN OF CARE
Bed alarm refused, pt up independently in NAD. IV antibiotics administered. Chart check complete.      Problem: Adult Inpatient Plan of Care  Goal: Plan of Care Review  Outcome: Progressing     Problem: Adult Inpatient Plan of Care  Goal: Patient-Specific Goal (Individualized)  Outcome: Progressing  Flowsheets (Taken 6/1/2025 0226)  Individualized Care Needs: none  Anxieties, Fears or Concerns: Pt concerned about when he'll be discharged  Patient/Family-Specific Goals (Include Timeframe): none     Problem: Adult Inpatient Plan of Care  Goal: Absence of Hospital-Acquired Illness or Injury  Outcome: Progressing     Problem: Adult Inpatient Plan of Care  Goal: Optimal Comfort and Wellbeing  Outcome: Progressing

## 2025-06-01 NOTE — PLAN OF CARE
Discussed poc with pt, pt verbalized understanding    Purposeful rounding every 2hours    VSS  Cardiac monitoring in use, pt is NSR/SB, tele monitor # [id number]  Fall precautions in place, remains injury free    Accurate I&Os  Abx given as prescribed  Bed locked at lowest position  Call light within reach    Chart check complete  Will cont with POC

## 2025-06-01 NOTE — ASSESSMENT & PLAN NOTE
Malnutrition Type:  Context: social/environmental circumstances  Level: severe    Related to (etiology):   Disordered eating pattern    Signs and Symptoms (as evidenced by):   BMI < 23 (older adult)  Underweight with loss of fat and muscle  Change in functional indicators ( strength)  Unable to eat sufficient energy/protein to maintain a healthy weigh      Malnutrition Characteristic Summary:  Energy Intake (Malnutrition): less than or equal to 75% for greater than or equal to 1 month  Subcutaneous Fat (Malnutrition): severe depletion  Muscle Mass (Malnutrition): severe depletion  Hand  Strength, Left (Malnutrition): decreased  Hand  Strength, Right (Malnutrition): decreased    Interventions/Recommendations (treatment strategy):  1. General diet  2. Commercial beverage medical food supplement therapy  3. Multivitamin mineral supplement therapy  4. Feeding assistance management  5. Collaboration by nutrition professional with other providers    Nutrition Diagnosis Status:   New

## 2025-06-01 NOTE — SUBJECTIVE & OBJECTIVE
Interval History:     No acute events overnight   Resting comfortably   Stated improvement in cough, hemoptysis   Hemoglobin slightly trended up to 7.7   Pending cultures, pulmonary clearance       Review of Systems  Objective:     Vital Signs (Most Recent):  Temp: 98.2 °F (36.8 °C) (06/01/25 1538)  Pulse: 65 (06/01/25 1538)  Resp: 18 (06/01/25 1538)  BP: 105/66 (06/01/25 1538)  SpO2: 97 % (06/01/25 1538) Vital Signs (24h Range):  Temp:  [98.1 °F (36.7 °C)-98.8 °F (37.1 °C)] 98.2 °F (36.8 °C)  Pulse:  [58-70] 65  Resp:  [16-20] 18  SpO2:  [96 %-98 %] 97 %  BP: (105-131)/(58-89) 105/66     Weight: 63.5 kg (139 lb 15.9 oz)  Body mass index is 20.67 kg/m².    Intake/Output Summary (Last 24 hours) at 6/1/2025 1552  Last data filed at 6/1/2025 0647  Gross per 24 hour   Intake 457.28 ml   Output 2 ml   Net 455.28 ml         Physical Exam      Constitutional:       General: He is not in acute distress.     Appearance: Normal appearance. He is normal weight. He is not ill-appearing, toxic-appearing or diaphoretic.   HENT:      Head: Normocephalic and atraumatic.      Right Ear: External ear normal.      Left Ear: External ear normal.      Nose: Nose normal. No congestion or rhinorrhea.      Mouth/Throat:      Mouth: Mucous membranes are dry.      Pharynx: Oropharynx is clear. No oropharyngeal exudate or posterior oropharyngeal erythema.      Comments: Poor dentition  Eyes:      General: No scleral icterus.     Extraocular Movements: Extraocular movements intact.      Conjunctiva/sclera: Conjunctivae normal.      Pupils: Pupils are equal, round, and reactive to light.      Comments: Pallor noted   Neck:      Vascular: No carotid bruit.   Cardiovascular:      Rate and Rhythm: Normal rate and regular rhythm.      Pulses: Normal pulses.      Heart sounds: Normal heart sounds. No murmur heard.     No friction rub. No gallop.   Pulmonary:      Effort: Pulmonary effort is normal. No respiratory distress.      Breath sounds: No  stridor. No wheezing, rhonchi or rales.      Comments: Right lung clear to auscultation throughout.  Left lung positive for expiratory wheezing and coarse breath sounds noted throughout greater in lower lobe  Chest:      Chest wall: No tenderness.   Abdominal:      General: Abdomen is flat. Bowel sounds are normal. There is no distension.      Palpations: Abdomen is soft. There is no mass.      Tenderness: There is no abdominal tenderness. There is no right CVA tenderness, left CVA tenderness, guarding or rebound.      Hernia: No hernia is present.   Musculoskeletal:         General: No swelling, tenderness, deformity or signs of injury. Normal range of motion.      Cervical back: Normal range of motion and neck supple. No rigidity or tenderness.      Right lower leg: No edema.      Left lower leg: No edema.   Lymphadenopathy:      Cervical: No cervical adenopathy.   Skin:     General: Skin is warm and dry.      Capillary Refill: Capillary refill takes less than 2 seconds.      Coloration: Skin is not jaundiced or pale.      Findings: No bruising, erythema, lesion or rash.   Neurological:      General: No focal deficit present.      Mental Status: He is alert and oriented to person, place, and time. Mental status is at baseline.      Cranial Nerves: No cranial nerve deficit.      Sensory: No sensory deficit.      Motor: No weakness.      Coordination: Coordination normal.   Psychiatric:         Mood and Affect: Mood normal.         Behavior: Behavior normal.         Thought Content: Thought content normal.         Judgment: Judgment normal.        Significant Labs: All pertinent labs within the past 24 hours have been reviewed.  CBC:   Recent Labs   Lab 05/30/25  1838 05/30/25  2116 05/31/25  0837 06/01/25  0639   WBC 6.74  --  6.47 7.52   HGB 3.9* 5.0* 7.0* 7.7*   HCT 15.6* 17.7* 23.9* 26.3*     --  195 200     CMP:   Recent Labs   Lab 05/30/25  1747 05/31/25  0837 06/01/25  0639   * 137 133*   K 3.9  4.3 3.8    109 106   CO2 24 22* 17*   GLU 97 76 78   BUN 22 16 12   CREATININE 0.9 0.8 0.7   CALCIUM 8.7 8.2* 8.3*   PROT 6.2 5.8* 6.0   ALBUMIN 3.3* 3.0* 3.2*   BILITOT 0.5 1.0 0.8   ALKPHOS 69 63 63   AST 12 13 16   ALT 11 7* 9*   ANIONGAP 8 6* 10       Significant Imaging:   Imaging Results              CT Chest With Contrast (Final result)  Result time 05/31/25 08:31:57      Final result by Aflonso Smith MD (05/31/25 08:31:57)                   Impression:     Extensive left consolidation consistent with pneumonia.  Associated volume loss with shift of the mediastinum to the left.  Small left pleural effusion.    Finalized on: 5/31/2025 8:31 AM By:  Alfonso Smith MD  [facility]# [id number]      2025-05-31 08:34:02.126     [facility]               Narrative:    EXAM:  CT CHEST WITH CONTRAST    CLINICAL HISTORY:  Hemoptysis.    COMPARISON: None.    TECHNIQUE:  Standard contrast-enhanced CT scan of the chest performed with 75 mL Isovue-370.  All CT scans at [this location] are performed using dose modulation techniques as appropriate to a performed exam including the following: automated exposure control; adjustment of the mA and/or kV according to patient size (this includes techniques or standardized protocols for targeted exams where dose is matched to indication / reason for exam; i.e. extremities or head); use of iterative reconstruction technique.    FINDINGS:  There is extensive consolidation involving the left upper and to a greater extent left lower lobes.  There is associated volume loss with shift of the mediastinum to the left.    There is a small left pleural effusion.    The right lung is grossly clear with minor atelectasis in the lung base.    The cardiac size is normal.  There is moderate coronary artery calcification.    The great vessels appear normal.  The pulmonary arteries are mildly enlarged.  No large filling defect    In the upper abdomen no significant finding is appreciated.                                          X-Ray Chest AP Portable (Final result)  Result time 05/30/25 18:19:29      Final result by Jluis Wick Jr., MD (05/30/25 18:19:29)                   Impression:      1.  Pneumonia.    Finalized on: 5/30/2025 6:19 PM By:  Jluis Wick MD  [facility]# [id number]      2025-05-30 18:21:33.995     [facility]               Narrative:    EXAM: XR CHEST AP PORTABLE    CLINICAL INDICATION:     Shortness of breath.    TECHNIQUE/FINDINGS: Left lower lobe and left midlung consolidation with small effusion.  Right lung is clear.  Multiple granulomas.  Cardiac silhouette and mediastinum within normal limits.

## 2025-06-01 NOTE — ASSESSMENT & PLAN NOTE
Anemia is likely due to chronic blood loss. Most recent hemoglobin and hematocrit are listed below.  Recent Labs     05/30/25  2116 05/31/25  0837 06/01/25  0639   HGB 5.0* 7.0* 7.7*   HCT 17.7* 23.9* 26.3*     Plan  - Monitor serial CBC: Every 8 hours  - Transfuse PRBC if patient becomes hemodynamically unstable, symptomatic or H/H drops below 7/21.  - s/p 4 units of prbc transfusion  - Patient's anemia is currently improving  -Hold antiplatelet/anticoagulation therapies   -Continue blood transfusion    6/1  Hb gradually trending up

## 2025-06-02 LAB
ABSOLUTE EOSINOPHIL (OHS): 0.46 K/UL
ABSOLUTE MONOCYTE (OHS): 0.53 K/UL (ref 0.3–1)
ABSOLUTE NEUTROPHIL COUNT (OHS): 6.25 K/UL (ref 1.8–7.7)
ACID FAST MOD KINY STN SPEC: NORMAL
ALBUMIN SERPL BCP-MCNC: 3.3 G/DL (ref 3.5–5.2)
ALP SERPL-CCNC: 65 UNIT/L (ref 40–150)
ALT SERPL W/O P-5'-P-CCNC: 7 UNIT/L (ref 10–44)
ANION GAP (OHS): 8 MMOL/L (ref 8–16)
AST SERPL-CCNC: 15 UNIT/L (ref 11–45)
BASOPHILS # BLD AUTO: 0.04 K/UL
BASOPHILS NFR BLD AUTO: 0.5 %
BILIRUB SERPL-MCNC: 0.6 MG/DL (ref 0.1–1)
BUN SERPL-MCNC: 14 MG/DL (ref 8–23)
CALCIUM SERPL-MCNC: 8.6 MG/DL (ref 8.7–10.5)
CHLORIDE SERPL-SCNC: 108 MMOL/L (ref 95–110)
CO2 SERPL-SCNC: 20 MMOL/L (ref 23–29)
CREAT SERPL-MCNC: 0.7 MG/DL (ref 0.5–1.4)
ERYTHROCYTE [DISTWIDTH] IN BLOOD BY AUTOMATED COUNT: 23.4 % (ref 11.5–14.5)
GFR SERPLBLD CREATININE-BSD FMLA CKD-EPI: >60 ML/MIN/1.73/M2
GLUCOSE SERPL-MCNC: 75 MG/DL (ref 70–110)
HCT VFR BLD AUTO: 26.8 % (ref 40–54)
HGB BLD-MCNC: 7.6 GM/DL (ref 14–18)
IMM GRANULOCYTES # BLD AUTO: 0.02 K/UL (ref 0–0.04)
IMM GRANULOCYTES NFR BLD AUTO: 0.2 % (ref 0–0.5)
LYMPHOCYTES # BLD AUTO: 1.06 K/UL (ref 1–4.8)
MCH RBC QN AUTO: 21.3 PG (ref 27–31)
MCHC RBC AUTO-ENTMCNC: 28.4 G/DL (ref 32–36)
MCV RBC AUTO: 75 FL (ref 82–98)
NUCLEATED RBC (/100WBC) (OHS): 0 /100 WBC
PLATELET # BLD AUTO: 216 K/UL (ref 150–450)
PMV BLD AUTO: 9.7 FL (ref 9.2–12.9)
POTASSIUM SERPL-SCNC: 4 MMOL/L (ref 3.5–5.1)
PROT SERPL-MCNC: 6.2 GM/DL (ref 6–8.4)
RBC # BLD AUTO: 3.56 M/UL (ref 4.6–6.2)
RELATIVE EOSINOPHIL (OHS): 5.5 %
RELATIVE LYMPHOCYTE (OHS): 12.7 % (ref 18–48)
RELATIVE MONOCYTE (OHS): 6.3 % (ref 4–15)
RELATIVE NEUTROPHIL (OHS): 74.8 % (ref 38–73)
SODIUM SERPL-SCNC: 136 MMOL/L (ref 136–145)
WBC # BLD AUTO: 8.36 K/UL (ref 3.9–12.7)

## 2025-06-02 PROCEDURE — 36415 COLL VENOUS BLD VENIPUNCTURE: CPT | Performed by: HOSPITALIST

## 2025-06-02 PROCEDURE — 82040 ASSAY OF SERUM ALBUMIN: CPT | Performed by: HOSPITALIST

## 2025-06-02 PROCEDURE — 99900035 HC TECH TIME PER 15 MIN (STAT)

## 2025-06-02 PROCEDURE — 25000003 PHARM REV CODE 250: Performed by: STUDENT IN AN ORGANIZED HEALTH CARE EDUCATION/TRAINING PROGRAM

## 2025-06-02 PROCEDURE — 21400001 HC TELEMETRY ROOM

## 2025-06-02 PROCEDURE — 63600175 PHARM REV CODE 636 W HCPCS: Performed by: STUDENT IN AN ORGANIZED HEALTH CARE EDUCATION/TRAINING PROGRAM

## 2025-06-02 PROCEDURE — 11000001 HC ACUTE MED/SURG PRIVATE ROOM

## 2025-06-02 PROCEDURE — 51798 US URINE CAPACITY MEASURE: CPT

## 2025-06-02 PROCEDURE — 85025 COMPLETE CBC W/AUTO DIFF WBC: CPT | Performed by: HOSPITALIST

## 2025-06-02 PROCEDURE — 63600175 PHARM REV CODE 636 W HCPCS: Performed by: INTERNAL MEDICINE

## 2025-06-02 RX ADMIN — AZITHROMYCIN MONOHYDRATE 500 MG: 500 INJECTION, POWDER, LYOPHILIZED, FOR SOLUTION INTRAVENOUS at 09:06

## 2025-06-02 RX ADMIN — CEFTRIAXONE 1 G: 1 INJECTION, POWDER, FOR SOLUTION INTRAMUSCULAR; INTRAVENOUS at 09:06

## 2025-06-02 RX ADMIN — GUAIFENESIN AND DEXTROMETHORPHAN HYDROBROMIDE 1 TABLET: 600; 30 TABLET, EXTENDED RELEASE ORAL at 09:06

## 2025-06-02 RX ADMIN — METRONIDAZOLE 500 MG: 5 INJECTION, SOLUTION INTRAVENOUS at 09:06

## 2025-06-02 RX ADMIN — SODIUM FERRIC GLUCONATE COMPLEX IN SUCROSE 125 MG: 12.5 INJECTION INTRAVENOUS at 09:06

## 2025-06-02 RX ADMIN — FOLIC ACID 1 MG: 1 TABLET ORAL at 09:06

## 2025-06-02 RX ADMIN — METRONIDAZOLE 500 MG: 5 INJECTION, SOLUTION INTRAVENOUS at 03:06

## 2025-06-02 NOTE — ASSESSMENT & PLAN NOTE
Patient has a diagnosis of pneumonia. The cause of the pneumonia is suspected to be bacterial in etiology but organism is not known. The pneumonia is stable. The patient has the following signs/symptoms of pneumonia: cough, sputum production, shortness of breath, and chest pain. The patient does not have a current oxygen requirement and the patient does not have a home oxygen requirement. I have reviewed the pertinent imaging. The following cultures have been collected: Blood cultures and Sputum culture The culture results are listed below.     Current antimicrobial regimen consists of the antibiotics listed below. Will monitor patient closely and continue current treatment plan unchanged.    Antibiotics (From admission, onward)      Start     Stop Route Frequency Ordered    05/31/25 1530  metronidazole IVPB 500 mg         -- IV Every 8 hours (non-standard times) 05/31/25 1419    05/31/25 1000  azithromycin (ZITHROMAX) 500 mg in 0.9% NaCl 250 mL IVPB (admixture device)         06/05/25 0959 IV Every 24 hours (non-standard times) 05/31/25 0849    05/31/25 0945  cefTRIAXone injection 1 g         -- IV Every 24 hours (non-standard times) 05/31/25 0844            Microbiology Results (last 7 days)       Procedure Component Value Units Date/Time    Blood culture [[id number]]  (Normal) Collected: 05/30/25 1705    Order Status: Completed Specimen: Blood from Peripheral, Antecubital, Left Updated: 06/02/25 0901     Blood Culture No Growth After 48 Hours    Blood Culture #2 **CANNOT BE ORDERED STAT** [[id number]]  (Normal) Collected: 05/30/25 2106    Order Status: Completed Specimen: Blood from Peripheral, Antecubital, Right Updated: 06/02/25 0901     Blood Culture No Growth After 48 Hours    Culture, Respiratory with Gram Stain [[id number]] Collected: 05/31/25 1526    Order Status: Completed Specimen: Respiratory from Sputum, Expectorated Updated: 06/02/25 0844     Respiratory Culture Normal respiratory yohan     GRAM STAIN  <10 Epithelial Cells/LPF      Moderate WBC seen      Rare Gram positive cocci    Culture, Respiratory with Gram Stain [[id number]] Collected: 05/31/25 0939    Order Status: Completed Specimen: Respiratory from Sputum Updated: 06/02/25 0844     Respiratory Culture Normal respiratory yohan     GRAM STAIN <10 Epithelial Cells/LPF      Rare WBC seen      Few Gram positive cocci    Afb Culture Stain [[id number]] Collected: 05/31/25 1526    Order Status: Sent Specimen: Sputum, Expectorated Updated: 06/01/25 0056    AFB Culture & Smear [[id number]] Collected: 05/31/25 1526    Order Status: Resulted Specimen: Sputum, Expectorated Updated: 05/31/25 1555        Pulm consulted and following   Recommended atypical + Anaerobic coverage for PNA    Continue IV Rocephin + Azithro + Flagyl   Sputum cx with normal resp yohan.   Per pulm, low suspicion for MTB but recommend to r/o atypical mycobacteria infection, AFB cultures pending   - Repeat CXR in AM   - will need close pulm followup on discharge and repeat CT imaging in 4-6 weeks to eval interval change/resolution

## 2025-06-02 NOTE — PLAN OF CARE
Problem: Adult Inpatient Plan of Care  Goal: Plan of Care Review  Outcome: Progressing  Goal: Patient-Specific Goal (Individualized)  Outcome: Progressing  Goal: Absence of Hospital-Acquired Illness or Injury  Outcome: Progressing  Goal: Optimal Comfort and Wellbeing  Outcome: Progressing  Goal: Readiness for Transition of Care  Outcome: Progressing     Problem: Fall Injury Risk  Goal: Absence of Fall and Fall-Related Injury  Outcome: Progressing     Problem: Pneumonia  Goal: Fluid Balance  Outcome: Progressing  Goal: Resolution of Infection Signs and Symptoms  Outcome: Progressing  Goal: Effective Oxygenation and Ventilation  Outcome: Progressing     Problem: Pain Acute  Goal: Optimal Pain Control and Function  Outcome: Progressing

## 2025-06-02 NOTE — HOSPITAL COURSE
6/2/2025: Respiratory status stable on room air. Denies shortness of breath/chest pain. Reports hemoptysis overnight. Has cup with small amount of bright red blood. States that hemoptysis has been present for about one year now. Hemodynamically stable. H/H stable. Sputum cx now with normal respiratory yohan. CT chest with left sided consolidation. Awaiting AFB.     6/3/2025: Hemoptysis improving. H/H 7/24.6 this am, receiving 1U PRBC. No complaints. Resp. Cultures negative.

## 2025-06-02 NOTE — SUBJECTIVE & OBJECTIVE
Objective:     Vital Signs (Most Recent):  Temp: 98.2 °F (36.8 °C) (06/02/25 0814)  Pulse: 62 (06/02/25 0848)  Resp: 20 (06/02/25 0814)  BP: 133/81 (06/02/25 0814)  SpO2: 97 % (06/02/25 0814) Vital Signs (24h Range):  Temp:  [97.6 °F (36.4 °C)-98.8 °F (37.1 °C)] 98.2 °F (36.8 °C)  Pulse:  [54-68] 62  Resp:  [18-20] 20  SpO2:  [95 %-98 %] 97 %  BP: (105-140)/(62-81) 133/81     Weight: 63.5 kg (139 lb 15.9 oz)  Body mass index is 20.67 kg/m².    No intake or output data in the 24 hours ending 06/02/25 1017     Physical Exam  Constitutional:       General: He is not in acute distress.  HENT:      Head: Normocephalic.      Mouth/Throat:      Mouth: Mucous membranes are moist.   Eyes:      Conjunctiva/sclera: Conjunctivae normal.      Pupils: Pupils are equal, round, and reactive to light.   Cardiovascular:      Rate and Rhythm: Normal rate and regular rhythm.      Pulses: Normal pulses.   Pulmonary:      Effort: Pulmonary effort is normal.      Breath sounds: Rhonchi and rales present. No wheezing.      Comments: Room air  Abdominal:      General: There is no distension.      Palpations: Abdomen is soft.      Tenderness: There is no abdominal tenderness.   Skin:     General: Skin is warm.      Coloration: Skin is not jaundiced.      Findings: No bruising.   Neurological:      Mental Status: He is alert and oriented to person, place, and time.   Psychiatric:         Mood and Affect: Mood normal.         Behavior: Behavior normal.     Review of Systems   Constitutional:  Negative for chills, fatigue and fever.   Respiratory:  Positive for cough. Negative for chest tightness, shortness of breath and wheezing.    Cardiovascular:  Negative for chest pain.   Gastrointestinal:  Negative for abdominal pain.   Genitourinary:  Negative for difficulty urinating and dysuria.   Neurological:  Negative for dizziness and headaches.   Psychiatric/Behavioral:  Negative for agitation and confusion.      Lines/Drains/Airways        Peripheral Intravenous Line  Duration                  Peripheral IV - Single Lumen 05/30/25 1702 18 G Anterior;Distal;Left Upper Arm 2 days         Peripheral IV - Single Lumen 05/30/25 1854 20 G Right Forearm 2 days         Peripheral IV - Single Lumen 05/30/25 2150 18 G Right Antecubital 2 days                  Significant Labs:    CBC/Anemia Profile:  Recent Labs   Lab 06/01/25  0639 06/02/25  0612   WBC 7.52 8.36   HGB 7.7* 7.6*   HCT 26.3* 26.8*    216   MCV 74* 75*   RDW 22.4* 23.4*     Chemistries:  Recent Labs   Lab 06/01/25  0639 06/02/25  0612   * 136   K 3.8 4.0    108   CO2 17* 20*   BUN 12 14   CREATININE 0.7 0.7   CALCIUM 8.3* 8.6*   ALBUMIN 3.2* 3.3*   PROT 6.0 6.2   BILITOT 0.8 0.6   ALKPHOS 63 65   ALT 9* 7*   AST 16 15     Microbiology Results (last 7 days)       Procedure Component Value Units Date/Time    Blood culture [[id number]]  (Normal) Collected: 05/30/25 1705    Order Status: Completed Specimen: Blood from Peripheral, Antecubital, Left Updated: 06/02/25 0901     Blood Culture No Growth After 48 Hours    Blood Culture #2 **CANNOT BE ORDERED STAT** [[id number]]  (Normal) Collected: 05/30/25 2106    Order Status: Completed Specimen: Blood from Peripheral, Antecubital, Right Updated: 06/02/25 0901     Blood Culture No Growth After 48 Hours    Culture, Respiratory with Gram Stain [[id number]] Collected: 05/31/25 1526    Order Status: Completed Specimen: Respiratory from Sputum, Expectorated Updated: 06/02/25 0844     Respiratory Culture Normal respiratory yohan     GRAM STAIN <10 Epithelial Cells/LPF      Moderate WBC seen      Rare Gram positive cocci    Culture, Respiratory with Gram Stain [[id number]] Collected: 05/31/25 0939    Order Status: Completed Specimen: Respiratory from Sputum Updated: 06/02/25 0844     Respiratory Culture Normal respiratory yohan     GRAM STAIN <10 Epithelial Cells/LPF      Rare WBC seen      Few Gram positive cocci    Afb Culture Stain  [[id number]] Collected: 05/31/25 1526    Order Status: Sent Specimen: Sputum, Expectorated Updated: 06/01/25 0056    AFB Culture & Smear [[id number]] Collected: 05/31/25 1526    Order Status: Resulted Specimen: Sputum, Expectorated Updated: 05/31/25 1555           Significant Imaging:  Imaging Results              CT Chest With Contrast (Final result)  Result time 05/31/25 08:31:57      Final result by Alfonso Smith MD (05/31/25 08:31:57)                   Impression:     Extensive left consolidation consistent with pneumonia.  Associated volume loss with shift of the mediastinum to the left.  Small left pleural effusion.    Finalized on: 5/31/2025 8:31 AM By:  Alfonso Smith MD  [facility]# [id number]      2025-05-31 08:34:02.126     [facility]               Narrative:    EXAM:  CT CHEST WITH CONTRAST    CLINICAL HISTORY:  Hemoptysis.    COMPARISON: None.    TECHNIQUE:  Standard contrast-enhanced CT scan of the chest performed with 75 mL Isovue-370.  All CT scans at [this location] are performed using dose modulation techniques as appropriate to a performed exam including the following: automated exposure control; adjustment of the mA and/or kV according to patient size (this includes techniques or standardized protocols for targeted exams where dose is matched to indication / reason for exam; i.e. extremities or head); use of iterative reconstruction technique.    FINDINGS:  There is extensive consolidation involving the left upper and to a greater extent left lower lobes.  There is associated volume loss with shift of the mediastinum to the left.    There is a small left pleural effusion.    The right lung is grossly clear with minor atelectasis in the lung base.    The cardiac size is normal.  There is moderate coronary artery calcification.    The great vessels appear normal.  The pulmonary arteries are mildly enlarged.  No large filling defect    In the upper abdomen no significant finding is appreciated.                                          X-Ray Chest AP Portable (Final result)  Result time 05/30/25 18:19:29      Final result by Jluis Wick Jr., MD (05/30/25 18:19:29)                   Impression:      1.  Pneumonia.    Finalized on: 5/30/2025 6:19 PM By:  Jluis Wick MD  [facility]# [id number]      2025-05-30 18:21:33.995     [facility]               Narrative:    EXAM: XR CHEST AP PORTABLE    CLINICAL INDICATION:     Shortness of breath.    TECHNIQUE/FINDINGS: Left lower lobe and left midlung consolidation with small effusion.  Right lung is clear.  Multiple granulomas.  Cardiac silhouette and mediastinum within normal limits.

## 2025-06-02 NOTE — PLAN OF CARE
A568/A568 SHANAE Tucker is a 64 y.o.male admitted on 5/30/2025 for Hemoptysis   Code Status: Full Code MRN: [id number]   Review of patient's allergies indicates:   Allergen Reactions    Chantix [varenicline]      Seizures. Pt states that he can't remember if it was Chantix or Wellbutrin that he had seizures with.    Wellbutrin [bupropion hcl]      seizures     Past Medical History:   Diagnosis Date    ADHD (attention deficit hyperactivity disorder)     Autism     Fetal alcohol syndrome     Seizures       PRN meds    0.9%  NaCl infusion (for blood administration), , Q24H PRN  0.9%  NaCl infusion (for blood administration), , Q24H PRN  acetaminophen, 650 mg, Q6H PRN  albuterol-ipratropium, 3 mL, Q6H PRN  dextrose 50%, 12.5 g, PRN  dextrose 50%, 25 g, PRN  glucagon (human recombinant), 1 mg, PRN  glucose, 16 g, PRN  glucose, 24 g, PRN  melatonin, 6 mg, Nightly PRN  naloxone, 0.02 mg, PRN  ondansetron, 4 mg, Q8H PRN  sodium chloride 0.9%, 10 mL, Q12H PRN      Chart check completed. Will continue plan of care.      Orientation: oriented x 4  Davian Coma Scale Score: 15     Lead Monitored: Lead II Rhythm: normal sinus rhythm Frequency/Ectopy: PVCs  Cardiac/Telemetry Box Number: [id number]  VTE Core Measure: Provider determined low risk VTE Last Bowel Movement: 06/01/25  Diet Heart Healthy     Jalil Score: 23  Fall Risk Score: 6  Accucheck []   Freq?      Lines/Drains/Airways       Peripheral Intravenous Line  Duration                  Peripheral IV - Single Lumen 05/30/25 1854 20 G Right Forearm 2 days         Peripheral IV - Single Lumen 05/30/25 2150 18 G Right Antecubital 2 days                       Problem: Adult Inpatient Plan of Care  Goal: Plan of Care Review  Outcome: Progressing  Goal: Patient-Specific Goal (Individualized)  Outcome: Progressing  Goal: Absence of Hospital-Acquired Illness or Injury  Outcome: Progressing  Goal: Optimal Comfort and Wellbeing  Outcome: Progressing  Goal: Readiness for Transition of  Care  Outcome: Progressing     Problem: Fall Injury Risk  Goal: Absence of Fall and Fall-Related Injury  Outcome: Progressing     Problem: Pneumonia  Goal: Fluid Balance  Outcome: Progressing  Goal: Resolution of Infection Signs and Symptoms  Outcome: Progressing  Goal: Effective Oxygenation and Ventilation  Outcome: Progressing     Problem: Pain Acute  Goal: Optimal Pain Control and Function  Outcome: Progressing

## 2025-06-02 NOTE — ASSESSMENT & PLAN NOTE
- Reports hemoptysis for approximately one year now  - Presented with worsening + symptomatic anemia  - Denies any known TB exposures/recent travel/fever  - CT chest with left sided consolidations consistent with pna  - Abx include aspiration and atypical coverage  - Continue to follow sputum cultures  - AFB pending   - Respiratory status is stable on RA  - 6/2: Still with hemoptysis overnight with small amount of bright red blood in cup; H/H and hemodynamics stable. Will plan to repeat CXR tomorrow morning. Will need close f/u with op pulmonology, repeat CT in 4-6 weeks

## 2025-06-02 NOTE — PROGRESS NOTES
[facility] Medicine  Progress Note    Patient Name: Shaan Tucker  MRN: [id number]  Patient Class: IP- Inpatient   Admission Date: 5/30/2025  Length of Stay: 3 days  Attending Physician: Zari Lowery MD  Primary Care Provider: Karla Dennis FNP        Subjective     Principal Problem:Hemoptysis        HPI:  Shaan Tucker is a 64 y.o. male with a PMH  has a past medical history of ADHD (attention deficit hyperactivity disorder), Autism, Fetal alcohol syndrome, and Seizures. who presented to the ED via EMS for further evaluation of persistent and worsening blood-tinged sputum x3 months duration.  Patient reported he has not seen a physician regarding this matter due to lack of transportation and reported calling EMS after experiencing extreme dizziness and inability to stand up.  Patient also reported taking over-the-counter naproxen daily over the past few months but denies use of other antiplatelet/anticoagulation therapies, and denied overseas or long-distance travel, TB exposure, experiencing any fever, chills, sweats, weight loss, epistaxis, hematemesis, abdominal pain, hematuria, hematochezia, or evidence of melena.  Associated symptoms also included worsening generalized weakness/fatigue, intermittent chest pain, and dyspnea/shortness of breath with exertion only.  He denies taking any prescribed medications, prior surgeries or blood transfusions, or experiencing similar symptoms previously.  Patient does report smoking cigars occasionally as well as smoking marijuana and doing cocaine.  He does not use inhalers, nebulizers, home oxygen, or CPAP outpatient and reported being in his usual state of health prior to onset of symptoms.  All other review of systems negative except as noted above.  Initial workup in the ED revealed patient to be afebrile without leukocytosis, initially hypotensive with blood pressure measuring 88/58 which improved following emergent blood transfusion, saturating  100% on room air in no acute distress and without use of accessory muscles noted, H/H 3.9/15.6, , troponin negative, urine toxicology positive for cocaine and THC, and chest x-ray positive for left lower lobe and left mid lung consolidations with small effusions and addition to multiple granulomas with clear appearing right lung.  Patient received emergent 2u of unmatched blood and 1u crossmatch blood in the ED with additional 1 unit crossmatch blood pending transfusion.  Patient underwent CT chest for further evaluation and admitted to Hospital Medicine inpatient for continued medical management.      PCP: Karla Dennis      Overview/Hospital Course:  Admitted under Hospital Medicine with hemoptysis (ongoing for over past 3-4 months, did not follow up with PCP/pulmonology outpatient so far), severe anemia, weight loss, pneumonia   CT chest showed- Extensive left consolidation consistent with pneumonia. Associated volume loss with shift of the mediastinum to the left. Small left pleural effusion.   History of noncompliance with outpatient follow-up visits, medications   Active smoking, smoking over past 30 years   Weight loss of approximately 40 lb over past 3 months   Severe anemia on arrival, status post 4 units of PRBC transfusion on 05/30  Pulmonology on board- per pulm--likely ?Aspiration component;  recommended antibiotics including anaerobic coverage,; Given chronicity and risk of atypical mycobacteria, recommended AFB; recommended Repeat imaging with CT in 4-6 weeks to ensure resolution; chest radiograph in 2-3 days to gauge improvement    Dispo- pending clinical improvement, pulmonology clearance    Interval History:  Patient is seen and examined with nursing at bedside.  Had approximately 8 cc of bright red blood hemoptysis overnight.  Denies chest pain, shortness of breath.  Sats stable on room air.    Review of Systems  Objective:     Vital Signs (Most Recent):  Temp: 98.1 °F (36.7 °C) (06/02/25  1235)  Pulse: 60 (06/02/25 1235)  Resp: 20 (06/02/25 1235)  BP: 125/62 (06/02/25 1235)  SpO2: 96 % (06/02/25 1235) Vital Signs (24h Range):  Temp:  [97.6 °F (36.4 °C)-98.2 °F (36.8 °C)] 98.1 °F (36.7 °C)  Pulse:  [54-65] 60  Resp:  [18-20] 20  SpO2:  [95 %-98 %] 96 %  BP: (105-140)/(62-81) 125/62     Weight: 63.5 kg (139 lb 15.9 oz)  Body mass index is 20.67 kg/m².    Intake/Output Summary (Last 24 hours) at 6/2/2025 1510  Last data filed at 6/2/2025 1310  Gross per 24 hour   Intake 1102.21 ml   Output 8 ml   Net 1094.21 ml         Physical Exam  Vitals and nursing note reviewed.   Constitutional:       General: He is not in acute distress.     Appearance: Ill appearance: chronic.   Cardiovascular:      Rate and Rhythm: Normal rate and regular rhythm.      Heart sounds: No murmur heard.  Pulmonary:      Effort: Pulmonary effort is normal.      Breath sounds: Rhonchi (LLL) present. No wheezing or rales.      Comments: On RA  Abdominal:      General: Bowel sounds are normal. There is no distension.      Palpations: Abdomen is soft.      Tenderness: There is no abdominal tenderness.   Musculoskeletal:      Right lower leg: No edema.      Left lower leg: No edema.   Neurological:      Mental Status: He is alert. Mental status is at baseline.               Significant Labs: All pertinent labs within the past 24 hours have been reviewed.    Significant Imaging: I have reviewed all pertinent imaging results/findings within the past 24 hours.      Assessment & Plan  Hemoptysis  Patient presented with persistent and worsening blood-tinged sputum/hemoptysis x3-6 months duration. Patient reported taking over-the-counter naproxen daily over the past few months but denies use of other antiplatelet/anticoagulation therapies, overseas or long-distance travel, TB exposure, experiencing any fever, chills, sweats, weight loss, epistaxis, hematemesis, abdominal pain, hematuria, hematochezia, or evidence of melena.  Chest x-ray  positive for left lower lobe and left mid lung consolidations with small effusions and addition to multiple granulomas with clear appearing right lung.  Patient s/p emergent 2u of unmatched blood and 2u crossmatch blood in the ED   - Pulmonology consulted and following   - CT chest with extensive L sided consolidation c/w PNA   - monitor CBC and exam   Symptomatic anemia  Anemia is likely due to chronic blood loss. Most recent hemoglobin and hematocrit are listed below.  Recent Labs     05/31/25  0837 06/01/25  0639 06/02/25  0612   HGB 7.0* 7.7* 7.6*   HCT 23.9* 26.3* 26.8*     Plan  - Monitor serial CBC: Daily  - Transfuse PRBC if patient becomes hemodynamically unstable, symptomatic or H/H drops below 7/21.  - s/p 4 units of prbc transfusion  - Patient's anemia is currently stable  -Hold antiplatelet/anticoagulation therapies       Pneumonia  Patient has a diagnosis of pneumonia. The cause of the pneumonia is suspected to be bacterial in etiology but organism is not known. The pneumonia is stable. The patient has the following signs/symptoms of pneumonia: cough, sputum production, shortness of breath, and chest pain. The patient does not have a current oxygen requirement and the patient does not have a home oxygen requirement. I have reviewed the pertinent imaging. The following cultures have been collected: Blood cultures and Sputum culture The culture results are listed below.     Current antimicrobial regimen consists of the antibiotics listed below. Will monitor patient closely and continue current treatment plan unchanged.    Antibiotics (From admission, onward)      Start     Stop Route Frequency Ordered    05/31/25 1530  metronidazole IVPB 500 mg         -- IV Every 8 hours (non-standard times) 05/31/25 1419    05/31/25 1000  azithromycin (ZITHROMAX) 500 mg in 0.9% NaCl 250 mL IVPB (admixture device)         06/05/25 0959 IV Every 24 hours (non-standard times) 05/31/25 0849    05/31/25 0945  cefTRIAXone  injection 1 g         -- IV Every 24 hours (non-standard times) 05/31/25 0844            Microbiology Results (last 7 days)       Procedure Component Value Units Date/Time    Blood culture [[id number]]  (Normal) Collected: 05/30/25 1705    Order Status: Completed Specimen: Blood from Peripheral, Antecubital, Left Updated: 06/02/25 0901     Blood Culture No Growth After 48 Hours    Blood Culture #2 **CANNOT BE ORDERED STAT** [[id number]]  (Normal) Collected: 05/30/25 2106    Order Status: Completed Specimen: Blood from Peripheral, Antecubital, Right Updated: 06/02/25 0901     Blood Culture No Growth After 48 Hours    Culture, Respiratory with Gram Stain [[id number]] Collected: 05/31/25 1526    Order Status: Completed Specimen: Respiratory from Sputum, Expectorated Updated: 06/02/25 0844     Respiratory Culture Normal respiratory yohan     GRAM STAIN <10 Epithelial Cells/LPF      Moderate WBC seen      Rare Gram positive cocci    Culture, Respiratory with Gram Stain [[id number]] Collected: 05/31/25 0939    Order Status: Completed Specimen: Respiratory from Sputum Updated: 06/02/25 0844     Respiratory Culture Normal respiratory yohan     GRAM STAIN <10 Epithelial Cells/LPF      Rare WBC seen      Few Gram positive cocci    Afb Culture Stain [[id number]] Collected: 05/31/25 1526    Order Status: Sent Specimen: Sputum, Expectorated Updated: 06/01/25 0056    AFB Culture & Smear [[id number]] Collected: 05/31/25 1526    Order Status: Resulted Specimen: Sputum, Expectorated Updated: 05/31/25 1555        Pulm consulted and following   Recommended atypical + Anaerobic coverage for PNA    Continue IV Rocephin + Azithro + Flagyl   Sputum cx with normal resp yohan.   Per pulm, low suspicion for MTB but recommend to r/o atypical mycobacteria infection, AFB cultures pending   - Repeat CXR in AM   - will need close pulm followup on discharge and repeat CT imaging in 4-6 weeks to eval interval change/resolution     Severe  protein-calorie malnutrition  Nutrition consulted. Most recent weight and BMI monitored-     Measurements:  Wt Readings from Last 1 Encounters:   06/01/25 63.5 kg (139 lb 15.9 oz)   Body mass index is 20.67 kg/m².    Patient has been screened and assessed by RD.    Malnutrition Type:  Context: social/environmental circumstances  Level: severe    Malnutrition Characteristic Summary:  Energy Intake (Malnutrition): less than or equal to 75% for greater than or equal to 1 month  Subcutaneous Fat (Malnutrition): severe depletion  Muscle Mass (Malnutrition): severe depletion  Hand  Strength, Left (Malnutrition): decreased  Hand  Strength, Right (Malnutrition): decreased    Interventions/Recommendations (treatment strategy):  1. Recommend a Regular diet 2. Recommendd Boost plus TID to assist filling nutritional gaps 3. Recommend a daily multivitamin 4. Encourage PO and supplement intake, recommend feeding assistance as warranted 5. Weigh twice weekly    VTE Risk Mitigation (From admission, onward)           Ordered     Reason for No Pharmacological VTE Prophylaxis  Once        Question Answer Comment   Reasons: Active Bleeding    Reasons: Risk of Bleeding        05/31/25 0154     IP VTE LOW RISK PATIENT  Once         05/31/25 0154     Place sequential compression device  Until discontinued         05/31/25 0154                    Discharge Planning   ADEOLA: 6/3/2025     Code Status: Full Code   Medical Readiness for Discharge Date:   Discharge Plan A: Home                        Zari Lowery MD  Department of Hospital Medicine   [place] - Med Surg

## 2025-06-02 NOTE — ASSESSMENT & PLAN NOTE
Anemia is likely due to chronic blood loss. Most recent hemoglobin and hematocrit are listed below.  Recent Labs     05/31/25  0837 06/01/25  0639 06/02/25  0612   HGB 7.0* 7.7* 7.6*   HCT 23.9* 26.3* 26.8*     Plan  - Monitor serial CBC: Daily  - Transfuse PRBC if patient becomes hemodynamically unstable, symptomatic or H/H drops below 7/21.  - s/p 4 units of prbc transfusion  - Patient's anemia is currently stable  -Hold antiplatelet/anticoagulation therapies

## 2025-06-02 NOTE — HPI
63 yo male with multiple chronic medical issues admitted with cough, failure to thrive and blood tinged sputum for > 1 month. No reported risk factors for TB exosure. CT shows extensive/dense LLL pneumonia. Tox screen + for cocaine and THC. Profoundly anemic at 4/16, given PRBC in ER. Pulmonary consulted for the above

## 2025-06-02 NOTE — ASSESSMENT & PLAN NOTE
Patient presented with persistent and worsening blood-tinged sputum/hemoptysis x3-6 months duration. Patient reported taking over-the-counter naproxen daily over the past few months but denies use of other antiplatelet/anticoagulation therapies, overseas or long-distance travel, TB exposure, experiencing any fever, chills, sweats, weight loss, epistaxis, hematemesis, abdominal pain, hematuria, hematochezia, or evidence of melena.  Chest x-ray positive for left lower lobe and left mid lung consolidations with small effusions and addition to multiple granulomas with clear appearing right lung.  Patient s/p emergent 2u of unmatched blood and 2u crossmatch blood in the ED   - Pulmonology consulted and following   - CT chest with extensive L sided consolidation c/w PNA   - monitor CBC and exam

## 2025-06-02 NOTE — PROGRESS NOTES
[facility]  Pulmonology  Progress Note    Patient Name: Shaan Tucker  MRN: [id number]  Admission Date: 5/30/2025  Hospital Length of Stay: 3 days  Code Status: Full Code  Attending Provider: Zari Lowery MD  Primary Care Provider: Karla Dennis FNP   Principal Problem: Hemoptysis    Subjective:     63 yo male with multiple chronic medical issues admitted with cough, failure to thrive and blood tinged sputum for > 1 month. No reported risk factors for TB exosure. CT shows extensive/dense LLL pneumonia. Tox screen + for cocaine and THC. Profoundly anemic at 4/16, given PRBC in ER. Pulmonary consulted for the above.    6/2/2025: Respiratory status stable on room air. Denies shortness of breath/chest pain. Reports hemoptysis overnight. Has cup with small amount of bright red blood. States that hemoptysis has been present for about one year now. Hemodynamically stable. H/H stable. Sputum cx now with normal respiratory yohan. CT chest with left sided consolidation. Awaiting AFB.     Objective:     Vital Signs (Most Recent):  Temp: 98.2 °F (36.8 °C) (06/02/25 0814)  Pulse: 62 (06/02/25 0848)  Resp: 20 (06/02/25 0814)  BP: 133/81 (06/02/25 0814)  SpO2: 97 % (06/02/25 0814) Vital Signs (24h Range):  Temp:  [97.6 °F (36.4 °C)-98.8 °F (37.1 °C)] 98.2 °F (36.8 °C)  Pulse:  [54-68] 62  Resp:  [18-20] 20  SpO2:  [95 %-98 %] 97 %  BP: (105-140)/(62-81) 133/81     Weight: 63.5 kg (139 lb 15.9 oz)  Body mass index is 20.67 kg/m².    No intake or output data in the 24 hours ending 06/02/25 1017     Physical Exam  Constitutional:       General: He is not in acute distress.  HENT:      Head: Normocephalic.      Mouth/Throat:      Mouth: Mucous membranes are moist.   Eyes:      Conjunctiva/sclera: Conjunctivae normal.      Pupils: Pupils are equal, round, and reactive to light.   Cardiovascular:      Rate and Rhythm: Normal rate and regular rhythm.      Pulses: Normal pulses.   Pulmonary:      Effort: Pulmonary effort is  normal.      Breath sounds: Rhonchi and rales present. No wheezing.      Comments: Room air  Abdominal:      General: There is no distension.      Palpations: Abdomen is soft.      Tenderness: There is no abdominal tenderness.   Skin:     General: Skin is warm.      Coloration: Skin is not jaundiced.      Findings: No bruising.   Neurological:      Mental Status: He is alert and oriented to person, place, and time.   Psychiatric:         Mood and Affect: Mood normal.         Behavior: Behavior normal.     Review of Systems   Constitutional:  Negative for chills, fatigue and fever.   Respiratory:  Positive for cough. Negative for chest tightness, shortness of breath and wheezing.    Cardiovascular:  Negative for chest pain.   Gastrointestinal:  Negative for abdominal pain.   Genitourinary:  Negative for difficulty urinating and dysuria.   Neurological:  Negative for dizziness and headaches.   Psychiatric/Behavioral:  Negative for agitation and confusion.      Lines/Drains/Airways       Peripheral Intravenous Line  Duration                  Peripheral IV - Single Lumen 05/30/25 1702 18 G Anterior;Distal;Left Upper Arm 2 days         Peripheral IV - Single Lumen 05/30/25 1854 20 G Right Forearm 2 days         Peripheral IV - Single Lumen 05/30/25 2150 18 G Right Antecubital 2 days                  Significant Labs:    CBC/Anemia Profile:  Recent Labs   Lab 06/01/25  0639 06/02/25  0612   WBC 7.52 8.36   HGB 7.7* 7.6*   HCT 26.3* 26.8*    216   MCV 74* 75*   RDW 22.4* 23.4*     Chemistries:  Recent Labs   Lab 06/01/25  0639 06/02/25  0612   * 136   K 3.8 4.0    108   CO2 17* 20*   BUN 12 14   CREATININE 0.7 0.7   CALCIUM 8.3* 8.6*   ALBUMIN 3.2* 3.3*   PROT 6.0 6.2   BILITOT 0.8 0.6   ALKPHOS 63 65   ALT 9* 7*   AST 16 15     Microbiology Results (last 7 days)       Procedure Component Value Units Date/Time    Blood culture [[id number]]  (Normal) Collected: 05/30/25 1705    Order Status: Completed  Specimen: Blood from Peripheral, Antecubital, Left Updated: 06/02/25 0901     Blood Culture No Growth After 48 Hours    Blood Culture #2 **CANNOT BE ORDERED STAT** [[id number]]  (Normal) Collected: 05/30/25 2106    Order Status: Completed Specimen: Blood from Peripheral, Antecubital, Right Updated: 06/02/25 0901     Blood Culture No Growth After 48 Hours    Culture, Respiratory with Gram Stain [[id number]] Collected: 05/31/25 1526    Order Status: Completed Specimen: Respiratory from Sputum, Expectorated Updated: 06/02/25 0844     Respiratory Culture Normal respiratory yohan     GRAM STAIN <10 Epithelial Cells/LPF      Moderate WBC seen      Rare Gram positive cocci    Culture, Respiratory with Gram Stain [[id number]] Collected: 05/31/25 0939    Order Status: Completed Specimen: Respiratory from Sputum Updated: 06/02/25 0844     Respiratory Culture Normal respiratory yohan     GRAM STAIN <10 Epithelial Cells/LPF      Rare WBC seen      Few Gram positive cocci    Afb Culture Stain [[id number]] Collected: 05/31/25 1526    Order Status: Sent Specimen: Sputum, Expectorated Updated: 06/01/25 0056    AFB Culture & Smear [[id number]] Collected: 05/31/25 1526    Order Status: Resulted Specimen: Sputum, Expectorated Updated: 05/31/25 1555           Significant Imaging:  Imaging Results              CT Chest With Contrast (Final result)  Result time 05/31/25 08:31:57      Final result by Alfonso Smith MD (05/31/25 08:31:57)                   Impression:     Extensive left consolidation consistent with pneumonia.  Associated volume loss with shift of the mediastinum to the left.  Small left pleural effusion.    Finalized on: 5/31/2025 8:31 AM By:  Alfonso Smith MD  [facility]# [id number]      2025-05-31 08:34:02.126     [facility]               Narrative:    EXAM:  CT CHEST WITH CONTRAST    CLINICAL HISTORY:  Hemoptysis.    COMPARISON: None.    TECHNIQUE:  Standard contrast-enhanced CT scan of the chest performed with 75 mL  [id number].  All CT scans at [this location] are performed using dose modulation techniques as appropriate to a performed exam including the following: automated exposure control; adjustment of the mA and/or kV according to patient size (this includes techniques or standardized protocols for targeted exams where dose is matched to indication / reason for exam; i.e. extremities or head); use of iterative reconstruction technique.    FINDINGS:  There is extensive consolidation involving the left upper and to a greater extent left lower lobes.  There is associated volume loss with shift of the mediastinum to the left.    There is a small left pleural effusion.    The right lung is grossly clear with minor atelectasis in the lung base.    The cardiac size is normal.  There is moderate coronary artery calcification.    The great vessels appear normal.  The pulmonary arteries are mildly enlarged.  No large filling defect    In the upper abdomen no significant finding is appreciated.                                         X-Ray Chest AP Portable (Final result)  Result time 05/30/25 18:19:29      Final result by Jluis Wick Jr., MD (05/30/25 18:19:29)                   Impression:      1.  Pneumonia.    Finalized on: 5/30/2025 6:19 PM By:  Jluis Wick MD  [facility]# [id number]      2025-05-30 18:21:33.995     [facility]               Narrative:    EXAM: XR CHEST AP PORTABLE    CLINICAL INDICATION:     Shortness of breath.    TECHNIQUE/FINDINGS: Left lower lobe and left midlung consolidation with small effusion.  Right lung is clear.  Multiple granulomas.  Cardiac silhouette and mediastinum within normal limits.                                       Assessment & Plan  Hemoptysis  Pneumonia  - Reports hemoptysis for approximately one year now  - Presented with worsening + symptomatic anemia  - Denies any known TB exposures/recent travel/fever  - CT chest with left sided consolidations consistent with pna  - Abx include  aspiration and atypical coverage  - Continue to follow sputum cultures  - AFB pending   - Respiratory status is stable on RA  - 6/2: Still with hemoptysis overnight with small amount of bright red blood in cup; H/H and hemodynamics stable. Will plan to repeat CXR tomorrow morning. Will need close f/u with op pulmonology, repeat CT in 4-6 weeks      Justin Gaspar PA-C  Pulmonology  [facility]

## 2025-06-02 NOTE — SUBJECTIVE & OBJECTIVE
Interval History:  Patient is seen and examined with nursing at bedside.  Had approximately 8 cc of bright red blood hemoptysis overnight.  Denies chest pain, shortness of breath.  Sats stable on room air.    Review of Systems  Objective:     Vital Signs (Most Recent):  Temp: 98.1 °F (36.7 °C) (06/02/25 1235)  Pulse: 60 (06/02/25 1235)  Resp: 20 (06/02/25 1235)  BP: 125/62 (06/02/25 1235)  SpO2: 96 % (06/02/25 1235) Vital Signs (24h Range):  Temp:  [97.6 °F (36.4 °C)-98.2 °F (36.8 °C)] 98.1 °F (36.7 °C)  Pulse:  [54-65] 60  Resp:  [18-20] 20  SpO2:  [95 %-98 %] 96 %  BP: (105-140)/(62-81) 125/62     Weight: 63.5 kg (139 lb 15.9 oz)  Body mass index is 20.67 kg/m².    Intake/Output Summary (Last 24 hours) at 6/2/2025 1510  Last data filed at 6/2/2025 1310  Gross per 24 hour   Intake 1102.21 ml   Output 8 ml   Net 1094.21 ml         Physical Exam  Vitals and nursing note reviewed.   Constitutional:       General: He is not in acute distress.     Appearance: Ill appearance: chronic.   Cardiovascular:      Rate and Rhythm: Normal rate and regular rhythm.      Heart sounds: No murmur heard.  Pulmonary:      Effort: Pulmonary effort is normal.      Breath sounds: Rhonchi (LLL) present. No wheezing or rales.      Comments: On RA  Abdominal:      General: Bowel sounds are normal. There is no distension.      Palpations: Abdomen is soft.      Tenderness: There is no abdominal tenderness.   Musculoskeletal:      Right lower leg: No edema.      Left lower leg: No edema.   Neurological:      Mental Status: He is alert. Mental status is at baseline.               Significant Labs: All pertinent labs within the past 24 hours have been reviewed.    Significant Imaging: I have reviewed all pertinent imaging results/findings within the past 24 hours.

## 2025-06-03 LAB
ABO + RH BLD: NORMAL
ABSOLUTE EOSINOPHIL (OHS): 0.64 K/UL
ABSOLUTE EOSINOPHIL (OHS): 0.82 K/UL
ABSOLUTE MONOCYTE (OHS): 0.48 K/UL (ref 0.3–1)
ABSOLUTE MONOCYTE (OHS): 0.51 K/UL (ref 0.3–1)
ABSOLUTE NEUTROPHIL COUNT (OHS): 4.89 K/UL (ref 1.8–7.7)
ABSOLUTE NEUTROPHIL COUNT (OHS): 6.23 K/UL (ref 1.8–7.7)
BACTERIA SPEC CULT: NORMAL
BACTERIA SPT CULT: NORMAL
BASOPHILS # BLD AUTO: 0.05 K/UL
BASOPHILS # BLD AUTO: 0.06 K/UL
BASOPHILS NFR BLD AUTO: 0.7 %
BASOPHILS NFR BLD AUTO: 0.7 %
BLD PROD TYP BPU: NORMAL
BLOOD UNIT EXPIRATION DATE: NORMAL
BLOOD UNIT TYPE CODE: 6200
CROSSMATCH INTERPRETATION: NORMAL
DISPENSE STATUS: NORMAL
ERYTHROCYTE [DISTWIDTH] IN BLOOD BY AUTOMATED COUNT: 23.9 % (ref 11.5–14.5)
ERYTHROCYTE [DISTWIDTH] IN BLOOD BY AUTOMATED COUNT: 24.2 % (ref 11.5–14.5)
GRAM STN SPEC: NORMAL
HCT VFR BLD AUTO: 24.6 % (ref 40–54)
HCT VFR BLD AUTO: 29.4 % (ref 40–54)
HGB BLD-MCNC: 7 GM/DL (ref 14–18)
HGB BLD-MCNC: 8.4 GM/DL (ref 14–18)
IMM GRANULOCYTES # BLD AUTO: 0.04 K/UL (ref 0–0.04)
IMM GRANULOCYTES # BLD AUTO: 0.07 K/UL (ref 0–0.04)
IMM GRANULOCYTES NFR BLD AUTO: 0.5 % (ref 0–0.5)
IMM GRANULOCYTES NFR BLD AUTO: 0.8 % (ref 0–0.5)
INDIRECT COOMBS: NORMAL
LYMPHOCYTES # BLD AUTO: 1.07 K/UL (ref 1–4.8)
LYMPHOCYTES # BLD AUTO: 1.17 K/UL (ref 1–4.8)
MCH RBC QN AUTO: 21.9 PG (ref 27–31)
MCH RBC QN AUTO: 22.5 PG (ref 27–31)
MCHC RBC AUTO-ENTMCNC: 28.5 G/DL (ref 32–36)
MCHC RBC AUTO-ENTMCNC: 28.6 G/DL (ref 32–36)
MCV RBC AUTO: 77 FL (ref 82–98)
MCV RBC AUTO: 79 FL (ref 82–98)
NUCLEATED RBC (/100WBC) (OHS): 0 /100 WBC
NUCLEATED RBC (/100WBC) (OHS): 0 /100 WBC
PLATELET # BLD AUTO: 171 K/UL (ref 150–450)
PLATELET # BLD AUTO: 176 K/UL (ref 150–450)
PMV BLD AUTO: 9.2 FL (ref 9.2–12.9)
PMV BLD AUTO: 9.3 FL (ref 9.2–12.9)
RBC # BLD AUTO: 3.2 M/UL (ref 4.6–6.2)
RBC # BLD AUTO: 3.73 M/UL (ref 4.6–6.2)
RELATIVE EOSINOPHIL (OHS): 8.8 %
RELATIVE EOSINOPHIL (OHS): 9.4 %
RELATIVE LYMPHOCYTE (OHS): 12.3 % (ref 18–48)
RELATIVE LYMPHOCYTE (OHS): 16 % (ref 18–48)
RELATIVE MONOCYTE (OHS): 5.5 % (ref 4–15)
RELATIVE MONOCYTE (OHS): 7 % (ref 4–15)
RELATIVE NEUTROPHIL (OHS): 67 % (ref 38–73)
RELATIVE NEUTROPHIL (OHS): 71.3 % (ref 38–73)
RH BLD: NORMAL
SPECIMEN OUTDATE: NORMAL
UNIT NUMBER: NORMAL
WBC # BLD AUTO: 7.3 K/UL (ref 3.9–12.7)
WBC # BLD AUTO: 8.73 K/UL (ref 3.9–12.7)

## 2025-06-03 PROCEDURE — 86900 BLOOD TYPING SEROLOGIC ABO: CPT | Performed by: INTERNAL MEDICINE

## 2025-06-03 PROCEDURE — 85025 COMPLETE CBC W/AUTO DIFF WBC: CPT | Performed by: INTERNAL MEDICINE

## 2025-06-03 PROCEDURE — 11000001 HC ACUTE MED/SURG PRIVATE ROOM

## 2025-06-03 PROCEDURE — 25000003 PHARM REV CODE 250: Performed by: INTERNAL MEDICINE

## 2025-06-03 PROCEDURE — 36415 COLL VENOUS BLD VENIPUNCTURE: CPT | Performed by: INTERNAL MEDICINE

## 2025-06-03 PROCEDURE — 63600175 PHARM REV CODE 636 W HCPCS: Performed by: STUDENT IN AN ORGANIZED HEALTH CARE EDUCATION/TRAINING PROGRAM

## 2025-06-03 PROCEDURE — 63600175 PHARM REV CODE 636 W HCPCS: Performed by: INTERNAL MEDICINE

## 2025-06-03 PROCEDURE — 86920 COMPATIBILITY TEST SPIN: CPT | Performed by: INTERNAL MEDICINE

## 2025-06-03 PROCEDURE — 21400001 HC TELEMETRY ROOM

## 2025-06-03 PROCEDURE — P9016 RBC LEUKOCYTES REDUCED: HCPCS | Performed by: INTERNAL MEDICINE

## 2025-06-03 PROCEDURE — 25000003 PHARM REV CODE 250: Performed by: STUDENT IN AN ORGANIZED HEALTH CARE EDUCATION/TRAINING PROGRAM

## 2025-06-03 RX ORDER — HYDROCODONE BITARTRATE AND ACETAMINOPHEN 500; 5 MG/1; MG/1
TABLET ORAL
Status: DISCONTINUED | OUTPATIENT
Start: 2025-06-03 | End: 2025-06-04 | Stop reason: HOSPADM

## 2025-06-03 RX ADMIN — GUAIFENESIN AND DEXTROMETHORPHAN HYDROBROMIDE 1 TABLET: 600; 30 TABLET, EXTENDED RELEASE ORAL at 08:06

## 2025-06-03 RX ADMIN — AZITHROMYCIN MONOHYDRATE 500 MG: 500 INJECTION, POWDER, LYOPHILIZED, FOR SOLUTION INTRAVENOUS at 09:06

## 2025-06-03 RX ADMIN — METRONIDAZOLE 500 MG: 5 INJECTION, SOLUTION INTRAVENOUS at 12:06

## 2025-06-03 RX ADMIN — GUAIFENESIN AND DEXTROMETHORPHAN HYDROBROMIDE 1 TABLET: 600; 30 TABLET, EXTENDED RELEASE ORAL at 09:06

## 2025-06-03 RX ADMIN — METRONIDAZOLE 500 MG: 5 INJECTION, SOLUTION INTRAVENOUS at 11:06

## 2025-06-03 RX ADMIN — FOLIC ACID 1 MG: 1 TABLET ORAL at 09:06

## 2025-06-03 RX ADMIN — METRONIDAZOLE 500 MG: 5 INJECTION, SOLUTION INTRAVENOUS at 06:06

## 2025-06-03 RX ADMIN — CEFTRIAXONE 1 G: 1 INJECTION, POWDER, FOR SOLUTION INTRAMUSCULAR; INTRAVENOUS at 09:06

## 2025-06-03 RX ADMIN — SODIUM CHLORIDE 125 MG: 9 INJECTION, SOLUTION INTRAVENOUS at 11:06

## 2025-06-03 RX ADMIN — METRONIDAZOLE 500 MG: 5 INJECTION, SOLUTION INTRAVENOUS at 04:06

## 2025-06-03 NOTE — PROGRESS NOTES
[facility] Medicine  Progress Note    Patient Name: Shaan Tucker  MRN: [id number]  Patient Class: IP- Inpatient   Admission Date: 5/30/2025  Length of Stay: 4 days  Attending Physician: Zari Lowery MD  Primary Care Provider: Karla Dennis FNP        Subjective     Principal Problem:Hemoptysis        HPI:  Shaan Tucker is a 64 y.o. male with a PMH  has a past medical history of ADHD (attention deficit hyperactivity disorder), Autism, Fetal alcohol syndrome, and Seizures. who presented to the ED via EMS for further evaluation of persistent and worsening blood-tinged sputum x3 months duration.  Patient reported he has not seen a physician regarding this matter due to lack of transportation and reported calling EMS after experiencing extreme dizziness and inability to stand up.  Patient also reported taking over-the-counter naproxen daily over the past few months but denies use of other antiplatelet/anticoagulation therapies, and denied overseas or long-distance travel, TB exposure, experiencing any fever, chills, sweats, weight loss, epistaxis, hematemesis, abdominal pain, hematuria, hematochezia, or evidence of melena.  Associated symptoms also included worsening generalized weakness/fatigue, intermittent chest pain, and dyspnea/shortness of breath with exertion only.  He denies taking any prescribed medications, prior surgeries or blood transfusions, or experiencing similar symptoms previously.  Patient does report smoking cigars occasionally as well as smoking marijuana and doing cocaine.  He does not use inhalers, nebulizers, home oxygen, or CPAP outpatient and reported being in his usual state of health prior to onset of symptoms.  All other review of systems negative except as noted above.  Initial workup in the ED revealed patient to be afebrile without leukocytosis, initially hypotensive with blood pressure measuring 88/58 which improved following emergent blood transfusion, saturating  100% on room air in no acute distress and without use of accessory muscles noted, H/H 3.9/15.6, , troponin negative, urine toxicology positive for cocaine and THC, and chest x-ray positive for left lower lobe and left mid lung consolidations with small effusions and addition to multiple granulomas with clear appearing right lung.  Patient received emergent 2u of unmatched blood and 1u crossmatch blood in the ED with additional 1 unit crossmatch blood pending transfusion.  Patient underwent CT chest for further evaluation and admitted to Hospital Medicine inpatient for continued medical management.      PCP: Karla Dennis      Overview/Hospital Course:  Admitted under Hospital Medicine with hemoptysis (ongoing for over past 3-4 months, did not follow up with PCP/pulmonology outpatient so far), severe anemia, weight loss, pneumonia. History of noncompliance with outpatient follow-up visits, medications . Active smoking, smoking over past 30 years     CT chest showed- Extensive left consolidation consistent with pneumonia. Associated volume loss with shift of the mediastinum to the left. Small left pleural effusion. Severe anemia on arrival, status post 4 units of PRBC transfusion on 05/30 and started on IV iron infusions inpatient.     Pulmonology consulted, per pulm--likely ?Aspiration component;  recommended antibiotics including anaerobic coverage, Given chronicity and risk of atypical mycobacteria, recommended AFB; recommended Repeat imaging with CT in 4-6 weeks to ensure resolution  Sputum culture with normal respiratory yohan.     As of 06/03, hemoptysis resolved, transfusing 1u PRBC 06/03 for Hgb 7. Repeat CXR fairly unchanged since admission, still with L sided consolidation. Discussed with Pulmonology Dr. Velazquez- he recommends transition to Levofloxacin to complete 7d course of antibiotics and outpatient pulm followup to ensure resolution. Anticipate discharge in Am if Hgb remains stable.      Interval History: NAEON.  Patient reports no further hemoptysis since yesterday afternoon.  Does cough but it is no longer productive.  Denies chest pain, shortness of breath, dizziness.  Hemoglobin downtrended slightly to 7.0, we will transfuse 1 unit PRBC.    Review of Systems  Objective:     Vital Signs (Most Recent):  Temp: 98.2 °F (36.8 °C) (06/03/25 1408)  Pulse: 65 (06/03/25 1408)  Resp: 18 (06/03/25 1408)  BP: 136/74 (06/03/25 1408)  SpO2: 97 % (06/03/25 1408) Vital Signs (24h Range):  Temp:  [97.7 °F (36.5 °C)-98.3 °F (36.8 °C)] 98.2 °F (36.8 °C)  Pulse:  [56-78] 65  Resp:  [18-20] 18  SpO2:  [95 %-98 %] 97 %  BP: (106-147)/(57-75) 136/74     Weight: 63.5 kg (139 lb 15.9 oz)  Body mass index is 20.67 kg/m².    Intake/Output Summary (Last 24 hours) at 6/3/2025 1523  Last data filed at 6/3/2025 1045  Gross per 24 hour   Intake 120 ml   Output --   Net 120 ml         Physical Exam  Vitals and nursing note reviewed.   Constitutional:       General: He is not in acute distress.     Appearance: Ill appearance: Chronic.   Cardiovascular:      Rate and Rhythm: Normal rate and regular rhythm.      Heart sounds: No murmur heard.  Pulmonary:      Effort: Pulmonary effort is normal.      Breath sounds: No wheezing. Rales: left lung base.     Comments: On room air  Abdominal:      General: Bowel sounds are normal. There is no distension.      Palpations: Abdomen is soft.      Tenderness: There is no abdominal tenderness.   Musculoskeletal:      Right lower leg: No edema.      Left lower leg: No edema.   Neurological:      Mental Status: He is alert. Mental status is at baseline.               Significant Labs: All pertinent labs within the past 24 hours have been reviewed.    Significant Imaging: I have reviewed all pertinent imaging results/findings within the past 24 hours.      Assessment & Plan  Hemoptysis  Patient presented with persistent and worsening blood-tinged sputum/hemoptysis x3-6 months duration.  Patient reported taking over-the-counter naproxen daily over the past few months but denies use of other antiplatelet/anticoagulation therapies, overseas or long-distance travel, TB exposure, experiencing any fever, chills, sweats, weight loss, epistaxis, hematemesis, abdominal pain, hematuria, hematochezia, or evidence of melena.  Chest x-ray positive for left lower lobe and left mid lung consolidations with small effusions and addition to multiple granulomas with clear appearing right lung.  Patient s/p emergent 2u of unmatched blood and 2u crossmatch blood in the ED   - Pulmonology consulted and following   - CT chest with extensive L sided consolidation c/w PNA   - hemoptysis resolved 06/03/2025  - monitor CBC and exam   Symptomatic anemia  Anemia is likely due to chronic blood loss. Most recent hemoglobin and hematocrit are listed below.  Recent Labs     06/01/25  0639 06/02/25  0612 06/03/25  0550   HGB 7.7* 7.6* 7.0*   HCT 26.3* 26.8* 24.6*     Plan  - Monitor serial CBC: Daily  - Transfuse PRBC if patient becomes hemodynamically unstable, symptomatic or H/H drops below 7/21.  - s/p 4 units of prbc transfusion  - received IV iron while hospitalized  - Patient's anemia is currently worsening. Will adjust treatment as follows:  Transfuse 1 unit PRBC 06/03/2025  -Hold antiplatelet/anticoagulation therapies       Pneumonia  Patient has a diagnosis of pneumonia. The cause of the pneumonia is suspected to be bacterial in etiology but organism is not known. The pneumonia is stable. The patient has the following signs/symptoms of pneumonia: cough, sputum production, shortness of breath, and chest pain. The patient does not have a current oxygen requirement and the patient does not have a home oxygen requirement. I have reviewed the pertinent imaging. The following cultures have been collected: Blood cultures and Sputum culture The culture results are listed below.     Current antimicrobial regimen consists of the  antibiotics listed below. Will monitor patient closely and continue current treatment plan unchanged.    Antibiotics (From admission, onward)      Start     Stop Route Frequency Ordered    05/31/25 1530  metronidazole IVPB 500 mg         -- IV Every 8 hours (non-standard times) 05/31/25 1419    05/31/25 1000  azithromycin (ZITHROMAX) 500 mg in 0.9% NaCl 250 mL IVPB (admixture device)         06/05/25 0959 IV Every 24 hours (non-standard times) 05/31/25 0849    05/31/25 0945  cefTRIAXone injection 1 g         -- IV Every 24 hours (non-standard times) 05/31/25 0844            Microbiology Results (last 7 days)       Procedure Component Value Units Date/Time    Culture, Respiratory with Gram Stain [[id number]] Collected: 05/31/25 0939    Order Status: Completed Specimen: Respiratory from Sputum Updated: 06/03/25 1146     Respiratory Culture Normal respiratory yohan     GRAM STAIN <10 Epithelial Cells/LPF      Rare WBC seen      Few Gram positive cocci    Culture, Respiratory with Gram Stain [[id number]] Collected: 05/31/25 1526    Order Status: Completed Specimen: Respiratory from Sputum, Expectorated Updated: 06/03/25 1146     Respiratory Culture Normal respiratory yohan     GRAM STAIN <10 Epithelial Cells/LPF      Moderate WBC seen      Rare Gram positive cocci    Blood culture [[id number]]  (Normal) Collected: 05/30/25 1705    Order Status: Completed Specimen: Blood from Peripheral, Antecubital, Left Updated: 06/03/25 0901     Blood Culture No Growth After 72 Hours    Blood Culture #2 **CANNOT BE ORDERED STAT** [[id number]]  (Normal) Collected: 05/30/25 2106    Order Status: Completed Specimen: Blood from Peripheral, Antecubital, Right Updated: 06/03/25 0901     Blood Culture No Growth After 72 Hours    Afb Culture Stain [[id number]] Collected: 05/31/25 1526    Order Status: Completed Specimen: Sputum, Expectorated Updated: 06/02/25 1556     ACID FAST STAIN  No acid fast bacilli seen    AFB Culture & Smear  [[id number]] Collected: 05/31/25 1526    Order Status: Resulted Specimen: Sputum, Expectorated Updated: 05/31/25 8022        Pulm consulted and following   Recommended atypical + Anaerobic coverage for PNA    Continue IV Rocephin + Azithro + Flagyl   Sputum cx with normal resp yohan.   Per pulm, low suspicion for MTB but recommend to r/o atypical mycobacteria infection, AFB cultures pending   - Repeat CXR personally reviewed and and grossly unchanged since admission, still shows left sided consolidation   -discussed with pulmonology Dr. Velazquez he recommends transition to Levofloxacin to complete 7d course of antibiotics and outpatient pulm followup to ensure resolution.  - will need close pulm followup on discharge and repeat CT imaging in 4-6 weeks to eval interval change/resolution     Severe protein-calorie malnutrition  Nutrition consulted. Most recent weight and BMI monitored-     Measurements:  Wt Readings from Last 1 Encounters:   06/01/25 63.5 kg (139 lb 15.9 oz)   Body mass index is 20.67 kg/m².    Patient has been screened and assessed by RD.    Malnutrition Type:  Context: social/environmental circumstances  Level: severe    Malnutrition Characteristic Summary:  Energy Intake (Malnutrition): less than or equal to 75% for greater than or equal to 1 month  Subcutaneous Fat (Malnutrition): severe depletion  Muscle Mass (Malnutrition): severe depletion  Hand  Strength, Left (Malnutrition): decreased  Hand  Strength, Right (Malnutrition): decreased    Interventions/Recommendations (treatment strategy):  1. Recommend a Regular diet 2. Recommendd Boost plus TID to assist filling nutritional gaps 3. Recommend a daily multivitamin 4. Encourage PO and supplement intake, recommend feeding assistance as warranted 5. Weigh twice weekly    VTE Risk Mitigation (From admission, onward)           Ordered     Reason for No Pharmacological VTE Prophylaxis  Once        Question Answer Comment   Reasons: Active  Bleeding    Reasons: Risk of Bleeding        05/31/25 0154     IP VTE LOW RISK PATIENT  Once         05/31/25 0154     Place sequential compression device  Until discontinued         05/31/25 0154                    Discharge Planning   ADEOLA: 6/3/2025     Code Status: Full Code   Medical Readiness for Discharge Date:   Discharge Plan A: Home                        Zari Lowery MD  Department of Hospital Medicine   [facility]

## 2025-06-03 NOTE — ASSESSMENT & PLAN NOTE
Anemia is likely due to chronic blood loss. Most recent hemoglobin and hematocrit are listed below.  Recent Labs     06/01/25  0639 06/02/25  0612 06/03/25  0550   HGB 7.7* 7.6* 7.0*   HCT 26.3* 26.8* 24.6*     Plan  - Monitor serial CBC: Daily  - Transfuse PRBC if patient becomes hemodynamically unstable, symptomatic or H/H drops below 7/21.  - s/p 4 units of prbc transfusion  - received IV iron while hospitalized  - Patient's anemia is currently worsening. Will adjust treatment as follows:  Transfuse 1 unit PRBC 06/03/2025  -Hold antiplatelet/anticoagulation therapies

## 2025-06-03 NOTE — SUBJECTIVE & OBJECTIVE
Objective:     Vital Signs (Most Recent):  Temp: 97.9 °F (36.6 °C) (06/03/25 0755)  Pulse: (!) 57 (06/03/25 0818)  Resp: 18 (06/03/25 0755)  BP: 117/63 (06/03/25 0755)  SpO2: 96 % (06/03/25 0755) Vital Signs (24h Range):  Temp:  [97.7 °F (36.5 °C)-98.1 °F (36.7 °C)] 97.9 °F (36.6 °C)  Pulse:  [57-66] 57  Resp:  [18-20] 18  SpO2:  [95 %-98 %] 96 %  BP: (112-147)/(57-75) 117/63     Weight: 63.5 kg (139 lb 15.9 oz)  Body mass index is 20.67 kg/m².  Intake/Output Summary (Last 24 hours) at 6/3/2025 1053  Last data filed at 6/2/2025 1310  Gross per 24 hour   Intake 1102.21 ml   Output --   Net 1102.21 ml     Physical Exam  Constitutional:       General: He is not in acute distress.     Appearance: He is not ill-appearing.   HENT:      Head: Normocephalic.      Mouth/Throat:      Mouth: Mucous membranes are moist.   Eyes:      General: No scleral icterus.     Conjunctiva/sclera: Conjunctivae normal.      Pupils: Pupils are equal, round, and reactive to light.   Cardiovascular:      Pulses: Normal pulses.   Pulmonary:      Breath sounds: Rhonchi and rales present.   Musculoskeletal:         General: No swelling. Normal range of motion.   Skin:     General: Skin is warm.      Coloration: Skin is not jaundiced.      Findings: No bruising.   Neurological:      General: No focal deficit present.      Mental Status: He is alert and oriented to person, place, and time.   Psychiatric:         Mood and Affect: Mood normal.         Behavior: Behavior normal.     Review of Systems   Constitutional:  Negative for chills, fatigue and fever.   Respiratory:  Negative for cough, chest tightness and shortness of breath.    Cardiovascular:  Negative for chest pain.   Gastrointestinal:  Negative for abdominal pain, nausea and vomiting.   Genitourinary:  Negative for dysuria.   Neurological:  Negative for dizziness and headaches.   Psychiatric/Behavioral:  Negative for agitation and confusion.      Lines/Drains/Airways       Peripheral  Intravenous Line  Duration                  Peripheral IV - Single Lumen 05/30/25 1854 20 G Right Forearm 3 days         Peripheral IV - Single Lumen 05/30/25 2150 18 G Right Antecubital 3 days                  Significant Labs:    CBC/Anemia Profile:  Recent Labs   Lab 06/02/25  0612 06/03/25  0550   WBC 8.36 7.30   HGB 7.6* 7.0*   HCT 26.8* 24.6*    176   MCV 75* 77*   RDW 23.4* 24.2*     Chemistries:  Recent Labs   Lab 06/02/25  0612      K 4.0      CO2 20*   BUN 14   CREATININE 0.7   CALCIUM 8.6*   ALBUMIN 3.3*   PROT 6.2   BILITOT 0.6   ALKPHOS 65   ALT 7*   AST 15     Significant Imaging:  I have reviewed all pertinent imaging results/findings within the past 24 hours.  I have reviewed and interpreted all pertinent imaging results/findings within the past 24 hours.

## 2025-06-03 NOTE — ASSESSMENT & PLAN NOTE
- Reports hemoptysis for approximately one year now  - Presented with worsening + symptomatic anemia  - Denies any known TB exposures/recent travel/fever  - CT chest with left sided consolidations consistent with pna  - Abx include aspiration and atypical coverage  - Continue to follow sputum cultures  - AFB pending   - Respiratory status is stable on RA  - 6/2: Still with hemoptysis overnight with small amount of bright red blood in cup; H/H and hemodynamics stable. Will plan to repeat CXR tomorrow morning. Will need close f/u with op pulmonology, repeat CT in 4-6 weeks  - 6/3: Hemoptysis appears to be improving. Sputum culture with NRF. No changes on repeat CXR today. Respiratory status is stable on room air. Will set up outpatient pulmonary f/u for 1-2 weeks with planned repeat imaging 4-6 weeks. Would continue course of oral Levaquin for 3 more days following discharge.

## 2025-06-03 NOTE — SUBJECTIVE & OBJECTIVE
Interval History: NAEON.  Patient reports no further hemoptysis since yesterday afternoon.  Does cough but it is no longer productive.  Denies chest pain, shortness of breath, dizziness.  Hemoglobin downtrended slightly to 7.0, we will transfuse 1 unit PRBC.    Review of Systems  Objective:     Vital Signs (Most Recent):  Temp: 98.2 °F (36.8 °C) (06/03/25 1408)  Pulse: 65 (06/03/25 1408)  Resp: 18 (06/03/25 1408)  BP: 136/74 (06/03/25 1408)  SpO2: 97 % (06/03/25 1408) Vital Signs (24h Range):  Temp:  [97.7 °F (36.5 °C)-98.3 °F (36.8 °C)] 98.2 °F (36.8 °C)  Pulse:  [56-78] 65  Resp:  [18-20] 18  SpO2:  [95 %-98 %] 97 %  BP: (106-147)/(57-75) 136/74     Weight: 63.5 kg (139 lb 15.9 oz)  Body mass index is 20.67 kg/m².    Intake/Output Summary (Last 24 hours) at 6/3/2025 1523  Last data filed at 6/3/2025 1045  Gross per 24 hour   Intake 120 ml   Output --   Net 120 ml         Physical Exam  Vitals and nursing note reviewed.   Constitutional:       General: He is not in acute distress.     Appearance: Ill appearance: Chronic.   Cardiovascular:      Rate and Rhythm: Normal rate and regular rhythm.      Heart sounds: No murmur heard.  Pulmonary:      Effort: Pulmonary effort is normal.      Breath sounds: No wheezing. Rales: left lung base.     Comments: On room air  Abdominal:      General: Bowel sounds are normal. There is no distension.      Palpations: Abdomen is soft.      Tenderness: There is no abdominal tenderness.   Musculoskeletal:      Right lower leg: No edema.      Left lower leg: No edema.   Neurological:      Mental Status: He is alert. Mental status is at baseline.               Significant Labs: All pertinent labs within the past 24 hours have been reviewed.    Significant Imaging: I have reviewed all pertinent imaging results/findings within the past 24 hours.

## 2025-06-03 NOTE — PLAN OF CARE
Discussed poc with pt, pt verbalized understanding    Purposeful rounding every 2hours    VS wnl  Cardiac monitoring in use, pt is NSR, tele monitor # [id number]  Fall precautions in place, remains injury free  Pt denies c/o pain and nausea    Pt receiving 1 unit of PRBCs  Accurate I&Os  Abx given as prescribed  Bed locked at lowest position  Call light within reach    Chart check complete  Will cont with POC

## 2025-06-03 NOTE — ASSESSMENT & PLAN NOTE
Patient presented with persistent and worsening blood-tinged sputum/hemoptysis x3-6 months duration. Patient reported taking over-the-counter naproxen daily over the past few months but denies use of other antiplatelet/anticoagulation therapies, overseas or long-distance travel, TB exposure, experiencing any fever, chills, sweats, weight loss, epistaxis, hematemesis, abdominal pain, hematuria, hematochezia, or evidence of melena.  Chest x-ray positive for left lower lobe and left mid lung consolidations with small effusions and addition to multiple granulomas with clear appearing right lung.  Patient s/p emergent 2u of unmatched blood and 2u crossmatch blood in the ED   - Pulmonology consulted and following   - CT chest with extensive L sided consolidation c/w PNA   - hemoptysis resolved 06/03/2025  - monitor CBC and exam

## 2025-06-03 NOTE — PLAN OF CARE
Discussed poc with pt, pt verbalized understanding  Purposeful rounding every 2hours  VS wnl  Cardiac monitoring in use, pt is NSR, tele monitor #[id number]  Fall precautions in place, remains injury free  Pain and nausea under control with PRN meds  Abx given as prescribed  Bed locked at lowest position  Call light within reach  Chart check complete  Will cont with POC

## 2025-06-03 NOTE — PROGRESS NOTES
[facility]  Pulmonology  Progress Note    Patient Name: Shaan Tucker  MRN: [id number]  Admission Date: 5/30/2025  Hospital Length of Stay: 4 days  Code Status: Full Code  Attending Provider: Zari Lowery MD  Primary Care Provider: Karla Dennis FNP   Principal Problem: Hemoptysis    Subjective:   65 yo male with multiple chronic medical issues admitted with cough, failure to thrive and blood tinged sputum for > 1 month. No reported risk factors for TB exosure. CT shows extensive/dense LLL pneumonia. Tox screen + for cocaine and THC. Profoundly anemic at 4/16, given PRBC in ER. Pulmonary consulted for the above.    6/2/2025: Respiratory status stable on room air. Denies shortness of breath/chest pain. Reports hemoptysis overnight. Has cup with small amount of bright red blood. States that hemoptysis has been present for about one year now. Hemodynamically stable. H/H stable. Sputum cx now with normal respiratory yohan. CT chest with left sided consolidation. Awaiting AFB.     6/3/2025: Hemoptysis improving. H/H 7/24.6 this am, receiving 1U PRBC. No complaints. Resp. Cultures negative.     Objective:     Vital Signs (Most Recent):  Temp: 97.9 °F (36.6 °C) (06/03/25 0755)  Pulse: (!) 57 (06/03/25 0818)  Resp: 18 (06/03/25 0755)  BP: 117/63 (06/03/25 0755)  SpO2: 96 % (06/03/25 0755) Vital Signs (24h Range):  Temp:  [97.7 °F (36.5 °C)-98.1 °F (36.7 °C)] 97.9 °F (36.6 °C)  Pulse:  [57-66] 57  Resp:  [18-20] 18  SpO2:  [95 %-98 %] 96 %  BP: (112-147)/(57-75) 117/63     Weight: 63.5 kg (139 lb 15.9 oz)  Body mass index is 20.67 kg/m².  Intake/Output Summary (Last 24 hours) at 6/3/2025 1053  Last data filed at 6/2/2025 1310  Gross per 24 hour   Intake 1102.21 ml   Output --   Net 1102.21 ml     Physical Exam  Constitutional:       General: He is not in acute distress.     Appearance: He is not ill-appearing.   HENT:      Head: Normocephalic.      Mouth/Throat:      Mouth: Mucous membranes are moist.   Eyes:       General: No scleral icterus.     Conjunctiva/sclera: Conjunctivae normal.      Pupils: Pupils are equal, round, and reactive to light.   Cardiovascular:      Pulses: Normal pulses.   Pulmonary:      Breath sounds: Rhonchi and rales present.   Musculoskeletal:         General: No swelling. Normal range of motion.   Skin:     General: Skin is warm.      Coloration: Skin is not jaundiced.      Findings: No bruising.   Neurological:      General: No focal deficit present.      Mental Status: He is alert and oriented to person, place, and time.   Psychiatric:         Mood and Affect: Mood normal.         Behavior: Behavior normal.     Review of Systems   Constitutional:  Negative for chills, fatigue and fever.   Respiratory:  Negative for cough, chest tightness and shortness of breath.    Cardiovascular:  Negative for chest pain.   Gastrointestinal:  Negative for abdominal pain, nausea and vomiting.   Genitourinary:  Negative for dysuria.   Neurological:  Negative for dizziness and headaches.   Psychiatric/Behavioral:  Negative for agitation and confusion.      Lines/Drains/Airways       Peripheral Intravenous Line  Duration                  Peripheral IV - Single Lumen 05/30/25 1854 20 G Right Forearm 3 days         Peripheral IV - Single Lumen 05/30/25 2150 18 G Right Antecubital 3 days                  Significant Labs:    CBC/Anemia Profile:  Recent Labs   Lab 06/02/25  0612 06/03/25  0550   WBC 8.36 7.30   HGB 7.6* 7.0*   HCT 26.8* 24.6*    176   MCV 75* 77*   RDW 23.4* 24.2*     Chemistries:  Recent Labs   Lab 06/02/25  0612      K 4.0      CO2 20*   BUN 14   CREATININE 0.7   CALCIUM 8.6*   ALBUMIN 3.3*   PROT 6.2   BILITOT 0.6   ALKPHOS 65   ALT 7*   AST 15     Significant Imaging:  I have reviewed all pertinent imaging results/findings within the past 24 hours.  I have reviewed and interpreted all pertinent imaging results/findings within the past 24 hours.    Assessment &  Plan  Hemoptysis  Pneumonia  - Reports hemoptysis for approximately one year now  - Presented with worsening + symptomatic anemia  - Denies any known TB exposures/recent travel/fever  - CT chest with left sided consolidations consistent with pna  - Abx include aspiration and atypical coverage  - Continue to follow sputum cultures  - AFB pending   - Respiratory status is stable on RA  - 6/2: Still with hemoptysis overnight with small amount of bright red blood in cup; H/H and hemodynamics stable. Will plan to repeat CXR tomorrow morning. Will need close f/u with op pulmonology, repeat CT in 4-6 weeks  - 6/3: Hemoptysis appears to be improving. Sputum culture with NRF. No changes on repeat CXR today. Respiratory status is stable on room air. Will set up outpatient pulmonary f/u for 1-2 weeks with planned repeat imaging 4-6 weeks. Would continue course of oral Levaquin for 3 more days following discharge.      Justin Gaspar PA-C  Pulmonology  [facility]

## 2025-06-03 NOTE — ASSESSMENT & PLAN NOTE
Patient has a diagnosis of pneumonia. The cause of the pneumonia is suspected to be bacterial in etiology but organism is not known. The pneumonia is stable. The patient has the following signs/symptoms of pneumonia: cough, sputum production, shortness of breath, and chest pain. The patient does not have a current oxygen requirement and the patient does not have a home oxygen requirement. I have reviewed the pertinent imaging. The following cultures have been collected: Blood cultures and Sputum culture The culture results are listed below.     Current antimicrobial regimen consists of the antibiotics listed below. Will monitor patient closely and continue current treatment plan unchanged.    Antibiotics (From admission, onward)      Start     Stop Route Frequency Ordered    05/31/25 1530  metronidazole IVPB 500 mg         -- IV Every 8 hours (non-standard times) 05/31/25 1419    05/31/25 1000  azithromycin (ZITHROMAX) 500 mg in 0.9% NaCl 250 mL IVPB (admixture device)         06/05/25 0959 IV Every 24 hours (non-standard times) 05/31/25 0849    05/31/25 0945  cefTRIAXone injection 1 g         -- IV Every 24 hours (non-standard times) 05/31/25 0844            Microbiology Results (last 7 days)       Procedure Component Value Units Date/Time    Culture, Respiratory with Gram Stain [[id number]] Collected: 05/31/25 0939    Order Status: Completed Specimen: Respiratory from Sputum Updated: 06/03/25 1146     Respiratory Culture Normal respiratory yohan     GRAM STAIN <10 Epithelial Cells/LPF      Rare WBC seen      Few Gram positive cocci    Culture, Respiratory with Gram Stain [[id number]] Collected: 05/31/25 1526    Order Status: Completed Specimen: Respiratory from Sputum, Expectorated Updated: 06/03/25 1146     Respiratory Culture Normal respiratory yohan     GRAM STAIN <10 Epithelial Cells/LPF      Moderate WBC seen      Rare Gram positive cocci    Blood culture [[id number]]  (Normal) Collected: 05/30/25 1705     Order Status: Completed Specimen: Blood from Peripheral, Antecubital, Left Updated: 06/03/25 0901     Blood Culture No Growth After 72 Hours    Blood Culture #2 **CANNOT BE ORDERED STAT** [[id number]]  (Normal) Collected: 05/30/25 2106    Order Status: Completed Specimen: Blood from Peripheral, Antecubital, Right Updated: 06/03/25 0901     Blood Culture No Growth After 72 Hours    Afb Culture Stain [[id number]] Collected: 05/31/25 1526    Order Status: Completed Specimen: Sputum, Expectorated Updated: 06/02/25 1556     ACID FAST STAIN  No acid fast bacilli seen    AFB Culture & Smear [[id number]] Collected: 05/31/25 1526    Order Status: Resulted Specimen: Sputum, Expectorated Updated: 05/31/25 1558        Pulm consulted and following   Recommended atypical + Anaerobic coverage for PNA    Continue IV Rocephin + Azithro + Flagyl   Sputum cx with normal resp yohan.   Per pulm, low suspicion for MTB but recommend to r/o atypical mycobacteria infection, AFB cultures pending   - Repeat CXR personally reviewed and and grossly unchanged since admission, still shows left sided consolidation   -discussed with pulmonology Dr. Velazquez he recommends transition to Levofloxacin to complete 7d course of antibiotics and outpatient pulm followup to ensure resolution.  - will need close pulm followup on discharge and repeat CT imaging in 4-6 weeks to eval interval change/resolution

## 2025-06-04 VITALS
WEIGHT: 140 LBS | HEIGHT: 69 IN | RESPIRATION RATE: 17 BRPM | DIASTOLIC BLOOD PRESSURE: 79 MMHG | TEMPERATURE: 98 F | HEART RATE: 64 BPM | BODY MASS INDEX: 20.73 KG/M2 | SYSTOLIC BLOOD PRESSURE: 149 MMHG | OXYGEN SATURATION: 97 %

## 2025-06-04 LAB
ABSOLUTE EOSINOPHIL (OHS): 0.64 K/UL
ABSOLUTE MONOCYTE (OHS): 0.48 K/UL (ref 0.3–1)
ABSOLUTE NEUTROPHIL COUNT (OHS): 6.23 K/UL (ref 1.8–7.7)
ANISOCYTOSIS BLD QL SMEAR: SLIGHT
BASOPHILS # BLD AUTO: 0.04 K/UL
BASOPHILS NFR BLD AUTO: 0.5 %
DACRYOCYTES BLD QL SMEAR: NORMAL
ERYTHROCYTE [DISTWIDTH] IN BLOOD BY AUTOMATED COUNT: 24.6 % (ref 11.5–14.5)
HCT VFR BLD AUTO: 31.5 % (ref 40–54)
HGB BLD-MCNC: 9 GM/DL (ref 14–18)
IMM GRANULOCYTES # BLD AUTO: 0.05 K/UL (ref 0–0.04)
IMM GRANULOCYTES NFR BLD AUTO: 0.6 % (ref 0–0.5)
LYMPHOCYTES # BLD AUTO: 0.87 K/UL (ref 1–4.8)
MCH RBC QN AUTO: 22.9 PG (ref 27–31)
MCHC RBC AUTO-ENTMCNC: 28.6 G/DL (ref 32–36)
MCV RBC AUTO: 80 FL (ref 82–98)
NUCLEATED RBC (/100WBC) (OHS): 0 /100 WBC
OVALOCYTES BLD QL SMEAR: NORMAL
PLATELET # BLD AUTO: 196 K/UL (ref 150–450)
PLATELET BLD QL SMEAR: NORMAL
PMV BLD AUTO: 9.4 FL (ref 9.2–12.9)
POIKILOCYTOSIS BLD QL SMEAR: NORMAL
POLYCHROMASIA BLD QL SMEAR: NORMAL
RBC # BLD AUTO: 3.93 M/UL (ref 4.6–6.2)
RELATIVE EOSINOPHIL (OHS): 7.7 %
RELATIVE LYMPHOCYTE (OHS): 10.5 % (ref 18–48)
RELATIVE MONOCYTE (OHS): 5.8 % (ref 4–15)
RELATIVE NEUTROPHIL (OHS): 74.9 % (ref 38–73)
WBC # BLD AUTO: 8.31 K/UL (ref 3.9–12.7)

## 2025-06-04 PROCEDURE — 63600175 PHARM REV CODE 636 W HCPCS: Performed by: INTERNAL MEDICINE

## 2025-06-04 PROCEDURE — 85025 COMPLETE CBC W/AUTO DIFF WBC: CPT | Performed by: INTERNAL MEDICINE

## 2025-06-04 PROCEDURE — 25000003 PHARM REV CODE 250: Performed by: STUDENT IN AN ORGANIZED HEALTH CARE EDUCATION/TRAINING PROGRAM

## 2025-06-04 PROCEDURE — 36415 COLL VENOUS BLD VENIPUNCTURE: CPT | Performed by: INTERNAL MEDICINE

## 2025-06-04 RX ORDER — LEVOFLOXACIN 750 MG/1
750 TABLET, FILM COATED ORAL DAILY
Qty: 3 TABLET | Refills: 0 | Status: SHIPPED | OUTPATIENT
Start: 2025-06-04 | End: 2025-06-07

## 2025-06-04 RX ORDER — FOLIC ACID 1 MG/1
1 TABLET ORAL DAILY
Qty: 30 TABLET | Refills: 0 | Status: SHIPPED | OUTPATIENT
Start: 2025-06-04 | End: 2025-07-04

## 2025-06-04 RX ADMIN — GUAIFENESIN AND DEXTROMETHORPHAN HYDROBROMIDE 1 TABLET: 600; 30 TABLET, EXTENDED RELEASE ORAL at 09:06

## 2025-06-04 RX ADMIN — FOLIC ACID 1 MG: 1 TABLET ORAL at 09:06

## 2025-06-04 RX ADMIN — METRONIDAZOLE 500 MG: 5 INJECTION, SOLUTION INTRAVENOUS at 06:06

## 2025-06-04 NOTE — ASSESSMENT & PLAN NOTE
Patient has a diagnosis of pneumonia. The cause of the pneumonia is suspected to be bacterial in etiology but organism is not known. The pneumonia is stable. The patient has the following signs/symptoms of pneumonia: cough, sputum production, shortness of breath, and chest pain. The patient does not have a current oxygen requirement and the patient does not have a home oxygen requirement. I have reviewed the pertinent imaging. The following cultures have been collected: Blood cultures and Sputum culture The culture results are listed below.     Current antimicrobial regimen consists of the antibiotics listed below. Will monitor patient closely and continue current treatment plan unchanged.    Antibiotics (From admission, onward)      Start     Stop Route Frequency Ordered    05/31/25 1530  metronidazole IVPB 500 mg         -- IV Every 8 hours (non-standard times) 05/31/25 1419    05/31/25 1000  azithromycin (ZITHROMAX) 500 mg in 0.9% NaCl 250 mL IVPB (admixture device)         06/05/25 0959 IV Every 24 hours (non-standard times) 05/31/25 0849    05/31/25 0945  cefTRIAXone injection 1 g         -- IV Every 24 hours (non-standard times) 05/31/25 0844            Microbiology Results (last 7 days)       Procedure Component Value Units Date/Time    Blood culture [[id number]]  (Normal) Collected: 05/30/25 1705    Order Status: Completed Specimen: Blood from Peripheral, Antecubital, Left Updated: 06/04/25 0900     Blood Culture No Growth After 96 hours    Blood Culture #2 **CANNOT BE ORDERED STAT** [[id number]]  (Normal) Collected: 05/30/25 2106    Order Status: Completed Specimen: Blood from Peripheral, Antecubital, Right Updated: 06/04/25 0900     Blood Culture No Growth After 96 hours    Culture, Respiratory with Gram Stain [[id number]] Collected: 05/31/25 0939    Order Status: Completed Specimen: Respiratory from Sputum Updated: 06/03/25 1146     Respiratory Culture Normal respiratory yohan     GRAM STAIN <10  Epithelial Cells/LPF      Rare WBC seen      Few Gram positive cocci    Culture, Respiratory with Gram Stain [[id number]] Collected: 05/31/25 1526    Order Status: Completed Specimen: Respiratory from Sputum, Expectorated Updated: 06/03/25 1146     Respiratory Culture Normal respiratory yohan     GRAM STAIN <10 Epithelial Cells/LPF      Moderate WBC seen      Rare Gram positive cocci    Afb Culture Stain [[id number]] Collected: 05/31/25 1526    Order Status: Completed Specimen: Sputum, Expectorated Updated: 06/02/25 1556     ACID FAST STAIN  No acid fast bacilli seen    AFB Culture & Smear [[id number]] Collected: 05/31/25 1526    Order Status: Resulted Specimen: Sputum, Expectorated Updated: 05/31/25 1555        Pulm consulted and following   Recommended atypical + Anaerobic coverage for PNA    Continue IV Rocephin + Azithro + Flagyl   Sputum cx with normal resp yohan.   Per pulm, low suspicion for MTB but recommend to r/o atypical mycobacteria infection, AFB culture prelim with no acid-fast bacilli seen on Gram stain  - Repeat CXR personally reviewed and and grossly unchanged since admission, still shows left sided consolidation   -discussed with pulmonology Dr. Velazquez he recommends transition to Levofloxacin to complete 7d course of antibiotics and outpatient pulm followup to ensure resolution.  - will need close pulm followup on discharge and repeat CT imaging in 4-6 weeks to eval interval change/resolution   - pulmonology follow up with Dr. Tran scheduled on 06/12/2025

## 2025-06-04 NOTE — PLAN OF CARE
Discussed poc with pt, pt verbalized understanding  Purposeful rounding every 2hours  VS wnl  Cardiac monitoring in use, pt is NSR, tele monitor #[id number]  Fall precautions in place, remains injury free  Abx given as prescribed  Bed locked at lowest position  Call light within reach  Chart check complete  Will cont with POC

## 2025-06-04 NOTE — ASSESSMENT & PLAN NOTE
Anemia is likely due to chronic blood loss. Most recent hemoglobin and hematocrit are listed below.  Recent Labs     06/03/25  0550 06/03/25  1832 06/04/25  0539   HGB 7.0* 8.4* 9.0*   HCT 24.6* 29.4* 31.5*     Plan  - Monitor serial CBC: Daily  - Transfuse PRBC if patient becomes hemodynamically unstable, symptomatic or H/H drops below 7/21.  - s/p 4 units of prbc transfusion on admission, 1 unit PRBC on 06/03/2025  - received IV iron while hospitalized  - Patient's anemia is currently improving  -Hold antiplatelet/anticoagulation therapies

## 2025-06-04 NOTE — DISCHARGE SUMMARY
[facility] Medicine  Discharge Summary      Patient Name: Shaan Tucker  MRN: [id number]  [facility]: [id number]  Patient Class: IP- Inpatient  Admission Date: 5/30/2025  Hospital Length of Stay: 5 days  Discharge Date and Time: 06/04/2025 11:27 AM  Attending Physician: No att. providers found   Discharging Provider: Zari Lowery MD  Primary Care Provider: Karla Dennis FNP    Primary Care Team: Networked reference to record PCT     HPI:   Shaan Tucker is a 64 y.o. male with a PMH  has a past medical history of ADHD (attention deficit hyperactivity disorder), Autism, Fetal alcohol syndrome, and Seizures. who presented to the ED via EMS for further evaluation of persistent and worsening blood-tinged sputum x3 months duration.  Patient reported he has not seen a physician regarding this matter due to lack of transportation and reported calling EMS after experiencing extreme dizziness and inability to stand up.  Patient also reported taking over-the-counter naproxen daily over the past few months but denies use of other antiplatelet/anticoagulation therapies, and denied overseas or long-distance travel, TB exposure, experiencing any fever, chills, sweats, weight loss, epistaxis, hematemesis, abdominal pain, hematuria, hematochezia, or evidence of melena.  Associated symptoms also included worsening generalized weakness/fatigue, intermittent chest pain, and dyspnea/shortness of breath with exertion only.  He denies taking any prescribed medications, prior surgeries or blood transfusions, or experiencing similar symptoms previously.  Patient does report smoking cigars occasionally as well as smoking marijuana and doing cocaine.  He does not use inhalers, nebulizers, home oxygen, or CPAP outpatient and reported being in his usual state of health prior to onset of symptoms.  All other review of systems negative except as noted above.  Initial workup in the ED revealed patient to be afebrile without  leukocytosis, initially hypotensive with blood pressure measuring 88/58 which improved following emergent blood transfusion, saturating 100% on room air in no acute distress and without use of accessory muscles noted, H/H 3.9/15.6, , troponin negative, urine toxicology positive for cocaine and THC, and chest x-ray positive for left lower lobe and left mid lung consolidations with small effusions and addition to multiple granulomas with clear appearing right lung.  Patient received emergent 2u of unmatched blood and 1u crossmatch blood in the ED with additional 1 unit crossmatch blood pending transfusion.  Patient underwent CT chest for further evaluation and admitted to Hospital Medicine inpatient for continued medical management.      PCP: Karla Dennis      * No surgery found *      Hospital Course:   Admitted under Hospital Medicine with hemoptysis (ongoing for over past 3-4 months, did not follow up with PCP/pulmonology outpatient so far), severe anemia, weight loss, pneumonia. History of noncompliance with outpatient follow-up visits, medications . Active smoking, smoking over past 30 years     CT chest showed- Extensive left consolidation consistent with pneumonia. Associated volume loss with shift of the mediastinum to the left. Small left pleural effusion. Severe anemia on arrival, status post 4 units of PRBC transfusion on 05/30 and started on IV iron infusions inpatient.     Pulmonology consulted, per pulm--likely ?Aspiration component;  recommended antibiotics including anaerobic coverage, Given chronicity and risk of atypical mycobacteria, recommended AFB; recommended Repeat imaging with CT in 4-6 weeks to ensure resolution  Sputum culture with normal respiratory yohan.  Prelim AFB culture with no acid-fast bacilli seen on stain.    As of 06/03, hemoptysis resolved, transfusing 1u PRBC 06/03 for Hgb 7. Repeat CXR fairly unchanged since admission, still with L sided consolidation. Discussed with  Pulmonology Dr. Velazquez- he recommends transition to Levofloxacin to complete 7d course of antibiotics and outpatient pulm followup to ensure resolution. Anticipate discharge in Am if Hgb remains stable.     06/04- patient is seen and examined.  hemoglobin improved to 9.0.  Patient reports no hemoptysis.  Denies chest pain, shortness of breath, sats stable on room air.  Appears stable for discharge home today with p.o. levofloxacin to complete total 7 day course of antibiotics per pulmonology recommendations.  will follow up with PCP within 3-5 days, and pulmonology as scheduled on 06/12/2025 for further management/repeat imaging.  Discharge medications, return precautions and follow-up discussed at length with the patient, all questions answered to satisfaction.     Goals of Care Treatment Preferences:  Code Status: Full Code      SDOH Screening:  The patient was screened for utility difficulties, food insecurity, transport difficulties, housing insecurity, and interpersonal safety and there were no concerns identified this admission.     Consults:   Consults (From admission, onward)          Status Ordering Provider     Inpatient consult to Social Work  Once        Provider:  (Not yet assigned)    Completed MEGAN BULL     Inpatient consult to Pulmonology  Once        Provider:  Otto Tran MD    Completed JARETH GUERRA            Assessment & Plan  Hemoptysis  Patient presented with persistent and worsening blood-tinged sputum/hemoptysis x3-6 months duration. Patient reported taking over-the-counter naproxen daily over the past few months but denies use of other antiplatelet/anticoagulation therapies, overseas or long-distance travel, TB exposure, experiencing any fever, chills, sweats, weight loss, epistaxis, hematemesis, abdominal pain, hematuria, hematochezia, or evidence of melena.  Chest x-ray positive for left lower lobe and left mid lung consolidations with small effusions and  addition to multiple granulomas with clear appearing right lung.  Patient s/p emergent 2u of unmatched blood and 2u crossmatch blood in the ED   - Pulmonology consulted and following   - CT chest with extensive L sided consolidation c/w PNA   - hemoptysis resolved 06/03/2025  - outpatient pulmonology follow-up  Symptomatic anemia  Anemia is likely due to chronic blood loss. Most recent hemoglobin and hematocrit are listed below.  Recent Labs     06/03/25  0550 06/03/25  1832 06/04/25  0539   HGB 7.0* 8.4* 9.0*   HCT 24.6* 29.4* 31.5*     Plan  - Monitor serial CBC: Daily  - Transfuse PRBC if patient becomes hemodynamically unstable, symptomatic or H/H drops below 7/21.  - s/p 4 units of prbc transfusion on admission, 1 unit PRBC on 06/03/2025  - received IV iron while hospitalized  - Patient's anemia is currently improving  -Hold antiplatelet/anticoagulation therapies       Pneumonia  Patient has a diagnosis of pneumonia. The cause of the pneumonia is suspected to be bacterial in etiology but organism is not known. The pneumonia is stable. The patient has the following signs/symptoms of pneumonia: cough, sputum production, shortness of breath, and chest pain. The patient does not have a current oxygen requirement and the patient does not have a home oxygen requirement. I have reviewed the pertinent imaging. The following cultures have been collected: Blood cultures and Sputum culture The culture results are listed below.     Current antimicrobial regimen consists of the antibiotics listed below. Will monitor patient closely and continue current treatment plan unchanged.    Antibiotics (From admission, onward)      Start     Stop Route Frequency Ordered    05/31/25 1530  metronidazole IVPB 500 mg         -- IV Every 8 hours (non-standard times) 05/31/25 1419    05/31/25 1000  azithromycin (ZITHROMAX) 500 mg in 0.9% NaCl 250 mL IVPB (admixture device)         06/05/25 0959 IV Every 24 hours (non-standard times)  05/31/25 0849    05/31/25 0945  cefTRIAXone injection 1 g         -- IV Every 24 hours (non-standard times) 05/31/25 0844            Microbiology Results (last 7 days)       Procedure Component Value Units Date/Time    Blood culture [[id number]]  (Normal) Collected: 05/30/25 1705    Order Status: Completed Specimen: Blood from Peripheral, Antecubital, Left Updated: 06/04/25 0900     Blood Culture No Growth After 96 hours    Blood Culture #2 **CANNOT BE ORDERED STAT** [[id number]]  (Normal) Collected: 05/30/25 2106    Order Status: Completed Specimen: Blood from Peripheral, Antecubital, Right Updated: 06/04/25 0900     Blood Culture No Growth After 96 hours    Culture, Respiratory with Gram Stain [[id number]] Collected: 05/31/25 0939    Order Status: Completed Specimen: Respiratory from Sputum Updated: 06/03/25 1146     Respiratory Culture Normal respiratory yohan     GRAM STAIN <10 Epithelial Cells/LPF      Rare WBC seen      Few Gram positive cocci    Culture, Respiratory with Gram Stain [[id number]] Collected: 05/31/25 1526    Order Status: Completed Specimen: Respiratory from Sputum, Expectorated Updated: 06/03/25 1146     Respiratory Culture Normal respiratory yohan     GRAM STAIN <10 Epithelial Cells/LPF      Moderate WBC seen      Rare Gram positive cocci    Afb Culture Stain [[id number]] Collected: 05/31/25 1526    Order Status: Completed Specimen: Sputum, Expectorated Updated: 06/02/25 1556     ACID FAST STAIN  No acid fast bacilli seen    AFB Culture & Smear [[id number]] Collected: 05/31/25 1526    Order Status: Resulted Specimen: Sputum, Expectorated Updated: 05/31/25 1555        Pulm consulted and following   Recommended atypical + Anaerobic coverage for PNA    Continue IV Rocephin + Azithro + Flagyl   Sputum cx with normal resp yohan.   Per pulm, low suspicion for MTB but recommend to r/o atypical mycobacteria infection, AFB culture prelim with no acid-fast bacilli seen on Gram stain  - Repeat CXR  personally reviewed and and grossly unchanged since admission, still shows left sided consolidation   -discussed with pulmonology Dr. Velazquez he recommends transition to Levofloxacin to complete 7d course of antibiotics and outpatient pulm followup to ensure resolution.  - will need close pulm followup on discharge and repeat CT imaging in 4-6 weeks to eval interval change/resolution   - pulmonology follow up with Dr. Tran scheduled on 06/12/2025    Severe protein-calorie malnutrition  Nutrition consulted. Most recent weight and BMI monitored-     Measurements:  Wt Readings from Last 1 Encounters:   06/01/25 63.5 kg (139 lb 15.9 oz)   Body mass index is 20.67 kg/m².    Patient has been screened and assessed by RD.    Malnutrition Type:  Context: social/environmental circumstances  Level: severe    Malnutrition Characteristic Summary:  Energy Intake (Malnutrition): less than or equal to 75% for greater than or equal to 1 month  Subcutaneous Fat (Malnutrition): severe depletion  Muscle Mass (Malnutrition): severe depletion  Hand  Strength, Left (Malnutrition): decreased  Hand  Strength, Right (Malnutrition): decreased    Interventions/Recommendations (treatment strategy):  1. Recommend a Regular diet 2. Recommendd Boost plus TID to assist filling nutritional gaps 3. Recommend a daily multivitamin 4. Encourage PO and supplement intake, recommend feeding assistance as warranted 5. Weigh twice weekly    Final Active Diagnoses:    Diagnosis Date Noted POA    PRINCIPAL PROBLEM:  Hemoptysis [R04.2] 05/31/2025 Yes    Pneumonia [J18.9] 05/31/2025 Yes    Severe protein-calorie malnutrition [E43] 06/01/2025 Yes    Symptomatic anemia [D64.9] 05/31/2025 Yes      Problems Resolved During this Admission:       Discharged Condition: good    Disposition: Home or Self Care    Follow Up:   Follow-up Information       Otto Tran MD Follow up on 6/12/2025.    Specialties: Pulmonary Disease, Critical Care Medicine  Why:  Followup as scheduled  Contact information:  [address] [place] [address]  [phone]               Jeana Carpenter PA-C. Schedule an appointment as soon as possible for a visit in 3 day(s).    Specialty: Family Medicine  Contact information:  [address] [place] [address]  [phone]                           Patient Instructions:      Ambulatory referral/consult to Pulmonology   Standing Status: Future   Referral Priority: Routine Referral Type: Consultation   Referral Reason: Specialty Services Required   Requested Specialty: Pulmonary Disease   Number of Visits Requested: 1     Diet Adult Regular     Notify your health care provider if you experience any of the following:  difficulty breathing or increased cough     Notify your health care provider if you experience any of the following:  temperature >100.4     Notify your health care provider if you experience any of the following:   Order Comments: Recurrent bleeding     Activity as tolerated       Significant Diagnostic Studies:  See Hospital Course     Pending Diagnostic Studies:       None           Medications:  Reconciled Home Medications:      Medication List        START taking these medications      folic acid 1 MG tablet  Commonly known as: FOLVITE  Take 1 tablet (1 mg total) by mouth once daily.     levoFLOXacin 750 MG tablet  Commonly known as: LEVAQUIN  Take 1 tablet (750 mg total) by mouth once daily. for 3 days            CONTINUE taking these medications      multivitamin per tablet  Commonly known as: THERAGRAN  Take 1 tablet by mouth once daily.     nicotine 21 mg/24 hr  Commonly known as: NICODERM CQ  Place 1 patch onto the skin once daily.            STOP taking these medications      nicotine (polacrilex) 2 mg Lzmn              Indwelling Lines/Drains at time of discharge:   Lines/Drains/Airways       None                   Time spent on the discharge of patient: 35 minutes         Zari KUMAR  MD Beverley  Department of Hospital Medicine  '[facility]

## 2025-06-04 NOTE — PLAN OF CARE
Discussed poc with pt, pt verbalized understanding     Purposeful rounding every 2hours     VS wnl  Cardiac monitoring in use, pt is NSR, tele monitor # [id number]  Fall precautions in place, remains injury free  Pt denies c/o pain and nausea       Accurate I&Os  Abx given as prescribed  Bed locked at lowest position  Call light within reach     Chart check complete  PT is ready for discharge

## 2025-06-04 NOTE — PLAN OF CARE
[facility] Med [facility]urg  Discharge Final Note    Primary Care Provider: Karla Dennis FNP    Expected Discharge Date: 6/4/2025    Final Discharge Note (most recent)       Final Note - 06/04/25 0838          Final Note    Assessment Type Final Discharge Note     Anticipated Discharge Disposition Home or Self Care     Hospital Resources/Appts/Education Provided --   pt doesnt have an [facility] pcp                           Contact Info       Otto Tarn MD   Specialty: Pulmonary Disease, Critical Care Medicine    [id number] [place] [address] [address] [place] [address]   Phone: [phone]       Next Steps: Follow up on 6/12/2025    Instructions: Followup as scheduled    Jeana Carpenter PA-C   Specialty: Family Medicine    [id number] [place] [address] [address] [place] [address]   Phone: [phone]       Next Steps: Schedule an appointment as soon as possible for a visit in 3 day(s)

## 2025-06-05 LAB
BACTERIA BLD CULT: NORMAL
BACTERIA BLD CULT: NORMAL

## 2025-06-12 ENCOUNTER — OFFICE VISIT (OUTPATIENT)
Dept: PULMONOLOGY | Facility: CLINIC | Age: 65
End: 2025-06-12
Payer: MEDICARE

## 2025-06-12 VITALS
HEIGHT: 69 IN | SYSTOLIC BLOOD PRESSURE: 108 MMHG | OXYGEN SATURATION: 96 % | HEART RATE: 60 BPM | RESPIRATION RATE: 20 BRPM | WEIGHT: 136.69 LBS | DIASTOLIC BLOOD PRESSURE: 62 MMHG | BODY MASS INDEX: 20.24 KG/M2

## 2025-06-12 DIAGNOSIS — J18.9 PNEUMONIA OF LEFT LOWER LOBE DUE TO INFECTIOUS ORGANISM: ICD-10-CM

## 2025-06-12 DIAGNOSIS — R04.2 HEMOPTYSIS: ICD-10-CM

## 2025-06-12 DIAGNOSIS — D64.9 SYMPTOMATIC ANEMIA: ICD-10-CM

## 2025-06-12 PROCEDURE — 1159F MED LIST DOCD IN RCRD: CPT | Mod: CPTII,S$GLB,, | Performed by: INTERNAL MEDICINE

## 2025-06-12 PROCEDURE — 3078F DIAST BP <80 MM HG: CPT | Mod: CPTII,S$GLB,, | Performed by: INTERNAL MEDICINE

## 2025-06-12 PROCEDURE — 1101F PT FALLS ASSESS-DOCD LE1/YR: CPT | Mod: CPTII,S$GLB,, | Performed by: INTERNAL MEDICINE

## 2025-06-12 PROCEDURE — 1125F AMNT PAIN NOTED PAIN PRSNT: CPT | Mod: CPTII,S$GLB,, | Performed by: INTERNAL MEDICINE

## 2025-06-12 PROCEDURE — 1111F DSCHRG MED/CURRENT MED MERGE: CPT | Mod: CPTII,S$GLB,, | Performed by: INTERNAL MEDICINE

## 2025-06-12 PROCEDURE — 3008F BODY MASS INDEX DOCD: CPT | Mod: CPTII,S$GLB,, | Performed by: INTERNAL MEDICINE

## 2025-06-12 PROCEDURE — 1160F RVW MEDS BY RX/DR IN RCRD: CPT | Mod: CPTII,S$GLB,, | Performed by: INTERNAL MEDICINE

## 2025-06-12 PROCEDURE — 99214 OFFICE O/P EST MOD 30 MIN: CPT | Mod: S$GLB,,, | Performed by: INTERNAL MEDICINE

## 2025-06-12 PROCEDURE — 3074F SYST BP LT 130 MM HG: CPT | Mod: CPTII,S$GLB,, | Performed by: INTERNAL MEDICINE

## 2025-06-12 PROCEDURE — 3288F FALL RISK ASSESSMENT DOCD: CPT | Mod: CPTII,S$GLB,, | Performed by: INTERNAL MEDICINE

## 2025-06-12 PROCEDURE — 99999 PR PBB SHADOW E&M-EST. PATIENT-LVL IV: CPT | Mod: PBBFAC,,, | Performed by: INTERNAL MEDICINE

## 2025-06-12 NOTE — PROGRESS NOTES
"Subjective:      Patient ID: Shaan Tucker is a 64 y.o. male.    Chief Complaint: Pneumonia and Hospital Follow Up      Pneumonia      64-year-old male smoker who I saw recently in the hospital when he was admitted for pneumonia and hemoptysis.  Evidently he has been having hemoptysis now for over a year to a point where he was severely anemic on presentation.  He was transfused packed red blood cells and treated with antibiotics.  He did improve somewhat and was discharged home.  Patient states that his hemoptysis persists.  He denies any ongoing fever, chills or sweats.  He has had unintentional weight loss over the last year.  He does have pleuritic chest pain especially on the left side.  He is here today as follow up of that recent hospital stay.  He did complete oral antibiotics    Review of Systems as per history of present illness otherwise negative  Objective:     Physical Exam   Constitutional: He is oriented to person, place, and time. He appears well-developed. He appears cachectic.   HENT:   Head: Normocephalic.   Cardiovascular: Normal rate and regular rhythm.   Pulmonary/Chest: Normal expansion. He has no wheezes. He has rhonchi. He has rales.   Neurological: He is alert and oriented to person, place, and time.   Psychiatric: He has a normal mood and affect.   Nursing note and vitals reviewed.          6/12/2025     9:02 AM 6/4/2025     7:30 AM 6/4/2025     4:31 AM 6/4/2025    12:14 AM 6/3/2025     8:45 PM 6/3/2025     4:44 PM 6/3/2025     4:29 PM   Pulmonary Function Tests   SpO2 96 % 97 % 97 % 97 % 97 % 94 % 98 %   Height 5' 9" (1.753 m)         Weight 62 kg (136 lb 11 oz)         BMI (Calculated) 20.2              Assessment:     1. Symptomatic anemia    2. Pneumonia of left lower lobe due to infectious organism    3. Hemoptysis      FINDINGS:  There is extensive consolidation involving the left upper and to a greater extent left lower lobes.  There is associated volume loss with shift of the " mediastinum to the left.     There is a small left pleural effusion.     The right lung is grossly clear with minor atelectasis in the lung base.     The cardiac size is normal.  There is moderate coronary artery calcification.     The great vessels appear normal.  The pulmonary arteries are mildly enlarged.  No large filling defect     In the upper abdomen no significant finding is appreciated.        Impression:   Extensive left consolidation consistent with pneumonia.  Associated volume loss with shift of the mediastinum to the left.  Small left pleural effusion    Plan:     Extensive left-sided pneumonia as demonstrated on recent CT  Severity of his pneumonia and chronicity of his hemoptysis is certainly worrisome for an ongoing infectious process not covered by current/recent antibiotic administration  I discussed the risks benefits and alternatives of bronchoscopy and recommended this to him to better determine source of his hemoptysis and the etiology of his pneumonia  He agreed and we will be scheduled for Monday the 16th at 11:00 a.m.  Preprocedure instructions and directions given  Further plans and any additional treatment we will be based on results of bronchoscopy procedure  Otherwise follow up with me in 4 weeks  I personally reviewed the above with the patient and/or family including test and radiology results. They voiced understanding and agreement with the above. Questions were answered to their apparent satisfaction.

## 2025-06-16 ENCOUNTER — HOSPITAL ENCOUNTER (OUTPATIENT)
Dept: ENDOSCOPY | Facility: HOSPITAL | Age: 65
Discharge: HOME | End: 2025-06-16
Attending: INTERNAL MEDICINE

## 2025-06-23 ENCOUNTER — ANESTHESIA EVENT (OUTPATIENT)
Dept: ENDOSCOPY | Facility: HOSPITAL | Age: 65
End: 2025-06-23
Payer: MEDICARE

## 2025-06-23 ENCOUNTER — ANESTHESIA (OUTPATIENT)
Dept: ENDOSCOPY | Facility: HOSPITAL | Age: 65
End: 2025-06-23
Payer: MEDICARE

## 2025-06-23 ENCOUNTER — HOSPITAL ENCOUNTER (OUTPATIENT)
Dept: ENDOSCOPY | Facility: HOSPITAL | Age: 65
Discharge: HOME | End: 2025-06-23
Attending: INTERNAL MEDICINE
Payer: MEDICARE

## 2025-06-23 DIAGNOSIS — J18.9 PNEUMONIA OF LEFT LOWER LOBE DUE TO INFECTIOUS ORGANISM: ICD-10-CM

## 2025-06-23 DIAGNOSIS — R04.2 HEMOPTYSIS: ICD-10-CM

## 2025-06-23 LAB — KOH PREP SPEC: NORMAL

## 2025-06-23 PROCEDURE — 37000009 HC ANESTHESIA EA ADD 15 MINS

## 2025-06-23 PROCEDURE — 87075 CULTR BACTERIA EXCEPT BLOOD: CPT | Performed by: INTERNAL MEDICINE

## 2025-06-23 PROCEDURE — 63600175 PHARM REV CODE 636 W HCPCS: Performed by: NURSE ANESTHETIST, CERTIFIED REGISTERED

## 2025-06-23 PROCEDURE — 87206 SMEAR FLUORESCENT/ACID STAI: CPT | Performed by: INTERNAL MEDICINE

## 2025-06-23 PROCEDURE — 87102 FUNGUS ISOLATION CULTURE: CPT | Performed by: INTERNAL MEDICINE

## 2025-06-23 PROCEDURE — 37000008 HC ANESTHESIA 1ST 15 MINUTES

## 2025-06-23 PROCEDURE — 87210 SMEAR WET MOUNT SALINE/INK: CPT | Performed by: INTERNAL MEDICINE

## 2025-06-23 PROCEDURE — 87116 MYCOBACTERIA CULTURE: CPT | Performed by: INTERNAL MEDICINE

## 2025-06-23 RX ORDER — PROPOFOL 10 MG/ML
VIAL (ML) INTRAVENOUS
Status: DISCONTINUED | OUTPATIENT
Start: 2025-06-23 | End: 2025-06-23

## 2025-06-23 RX ORDER — LIDOCAINE HYDROCHLORIDE 20 MG/ML
INJECTION, SOLUTION EPIDURAL; INFILTRATION; INTRACAUDAL; PERINEURAL
Status: DISCONTINUED | OUTPATIENT
Start: 2025-06-23 | End: 2025-06-23

## 2025-06-23 RX ADMIN — PROPOFOL 150 MG: 10 INJECTION, EMULSION INTRAVENOUS at 11:06

## 2025-06-23 RX ADMIN — SODIUM CHLORIDE, POTASSIUM CHLORIDE, SODIUM LACTATE AND CALCIUM CHLORIDE: 600; 310; 30; 20 INJECTION, SOLUTION INTRAVENOUS at 11:06

## 2025-06-23 RX ADMIN — LIDOCAINE HYDROCHLORIDE 100 MG: 20 INJECTION, SOLUTION EPIDURAL; INFILTRATION; INTRACAUDAL; PERINEURAL at 11:06

## 2025-06-23 NOTE — ANESTHESIA POSTPROCEDURE EVALUATION
Anesthesia Post Evaluation    Patient: Shaan Tucker    Procedure(s) Performed: * No procedures listed *    Final Anesthesia Type: general      Patient location during evaluation: GI PACU  Patient participation: Yes- Able to Participate  Level of consciousness: awake and alert  Post-procedure vital signs: reviewed and stable  Pain management: adequate  Airway patency: patent    PONV status at discharge: No PONV  Anesthetic complications: no      Cardiovascular status: hemodynamically stable  Respiratory status: unassisted, room air and spontaneous ventilation  Hydration status: euvolemic  Follow-up not needed.              Vitals Value Taken Time   /62 06/23/25 11:51   Temp 37 °C (98.6 °F) 06/23/25 11:41   Pulse 58 06/23/25 11:51   Resp 17 06/23/25 11:51   SpO2 100 % 06/23/25 11:51         No case tracking events are documented in the log.      Pain/Shazia Score: Shazia Score: 10 (6/23/2025 11:51 AM)

## 2025-06-23 NOTE — OP NOTE
Pre-operative diagnosis: Left lower lobe pneumonia, hemoptysis  Post-operative diagnosis: same s/p bronchoscopy with airway inspection and BAL LLL  Operating MD: Otto Tran MD  Procedure performed: bronchoscopy with airway inspection and BAL LLL  Anesthesia: General  EBL: 15ml  Specimens obtained: LLL BAL 100ml in/30 out bloody  Complications: none    Monitoring for procedure:  ETCO2, Pulse oximetry, BP, EKG      Procedure in detail:  ASA: III  Mallimpati score: II      Findings:  Vocal cords: ET tube in place  Trachea:  bloody secretions  Left/Right main bronchi: bloody secretions  RUL: bloody secretions  RML: bloody secretions  RLL: bloody secretions  RAVINDER: bloody secretions  Lingula: bloody secretions  LLL: bloody secretions    Upon entry into the airway, bloody secretions were present everywhere. After suctioning, the source was from the left lower lobe. No endobronchial source or lesion was visualized, just general oozing from the left lower lobe airways  After specimens obtained and hemostasis ensured, the scope was removed intact. Patient tolerated the procedure well. No complications noted. To recovery in stable condition.

## 2025-06-23 NOTE — DISCHARGE SUMMARY
[facility] (Hospital)  Discharge Note  Short Stay    Bronchoscopy      OUTCOME: Patient tolerated treatment/procedure well without complication and is now ready for discharge.    DISPOSITION: Home or Self Care    FINAL DIAGNOSIS:  <principal problem not specified>    FOLLOWUP: In clinic    DISCHARGE INSTRUCTIONS:    Discharge Procedure Orders   Diet general        TIME SPENT ON DISCHARGE: 30 minutes

## 2025-06-23 NOTE — ANESTHESIA PROCEDURE NOTES
Intubation    Date/Time: 6/23/2025 11:20 AM    Performed by: Becca Toussaint CRNA  Authorized by: Daljit Watson MD    Intubation:     Induction:  Intravenous    Intubated:  Postinduction    Mask Ventilation:  Easy mask    Attempts:  1    Attempted By:  CRNA    Method of Intubation:  Video laryngoscopy    Blade:  Cochran 3    Laryngeal View Grade: Grade I - full view of cords      Difficult Airway Encountered?: No      Complications:  None    Airway Device:  Oral endotracheal tube    Airway Device Size:  8.5    Style/Cuff Inflation:  Cuffed (inflated to minimal occlusive pressure)    Tube secured:  22    Secured at:  The lips    Placement Verified By:  Capnometry    Complicating Factors:  None    Findings Post-Intubation:  BS equal bilateral and atraumatic/condition of teeth unchanged

## 2025-06-23 NOTE — TRANSFER OF CARE
"Anesthesia Transfer of Care Note    Patient: Shaan Tucker    Procedure(s) Performed: * No procedures listed *    Patient location: GI    Anesthesia Type: general    Transport from OR: Transported from OR on room air with adequate spontaneous ventilation    Post pain: adequate analgesia    Post assessment: no apparent anesthetic complications    Post vital signs: stable    Level of consciousness: awake    Nausea/Vomiting: no nausea/vomiting    Complications: none    Transfer of care protocol was followed      Last vitals: Visit Vitals  /73 (BP Location: Left arm, Patient Position: Sitting)   Pulse (!) 55   Temp 36.5 °C (97.7 °F) (Temporal)   Resp 15   Ht 5' 9" (1.753 m)   Wt 58.5 kg (129 lb)   SpO2 99%   BMI 19.05 kg/m²     "

## 2025-06-23 NOTE — ANESTHESIA PREPROCEDURE EVALUATION
06/23/2025  Shaan Tucker is a 64 y.o., male.      Pre-op Assessment    I have reviewed the Patient Summary Reports.    I have reviewed the NPO Status.      Review of Systems  Social:  Former Smoker       Cardiovascular:  Cardiovascular Normal                                              Pulmonary:  Pneumonia       Hemoptysis               Hepatic/GI:  Hepatic/GI Normal                    Musculoskeletal:  Musculoskeletal Normal                Neurological:       Seizures    Autism                             Endocrine:  Endocrine Normal            Psych:  Psychiatric History   ADHD               Physical Exam  General: Well nourished, Cooperative, Alert and Oriented    Airway:  Mallampati: II   Mouth Opening: Normal  TM Distance: Normal  Tongue: Normal  Neck ROM: Normal ROM    Dental:  Intact        Anesthesia Plan  Type of Anesthesia, risks & benefits discussed:    Anesthesia Type: Gen Supraglottic Airway, Gen ETT  Intra-op Monitoring Plan: Standard ASA Monitors  Post Op Pain Control Plan: IV/PO Opioids PRN  Induction:  IV  Informed Consent: Informed consent signed with the Patient and all parties understand the risks and agree with anesthesia plan.  All questions answered.   ASA Score: 2  Day of Surgery Review of History & Physical: H&P Update referred to the surgeon/provider.I have interviewed and examined the patient. I have reviewed the patient's H&P dated: There are no significant changes.     Ready For Surgery From Anesthesia Perspective.     .  Summary         Left Ventricle: The left ventricle is mildly dilated. Normal wall thickness. There is low normal systolic function with a visually estimated ejection fraction of 50 - 55%. Ejection fraction is approximately 50%. There is normal diastolic function.    Right Ventricle: The right ventricle is normal in size Wall thickness is normal. Systolic  function is normal.    Left Atrium: The left atrium is severely dilated    Pulmonary Artery: The estimated pulmonary artery systolic pressure is 41 mmHg.    IVC/SVC: Normal venous pressure at 3 mmHg.    Vent. Rate :  67 BPM     Atrial Rate :  67 BPM     P-R Int : 146 ms          QRS Dur : 106 ms      QT Int : 410 ms       P-R-T Axes :  66  88  88 degrees    QTcB Int : 433 ms    Normal sinus rhythm  Normal ECG  No previous ECGs available  Confirmed by Inocencio De Souza ([id number]) on 6/1/2025 6:40:48 PM    Referred By: AAAREFERRAL SELF           Confirmed By: Inocencio De Souza

## 2025-06-23 NOTE — PLAN OF CARE
Patient discharged from unit with all personal belongings in stable condition via wheelchair. Driven per Sintia Langford.

## 2025-06-23 NOTE — H&P
"Subjective:      Patient ID: Shaan Tucker is a 64 y.o. male.     Chief Complaint: Pneumonia and Hospital Follow Up        Pneumonia        64-year-old male smoker who I saw recently in the hospital when he was admitted for pneumonia and hemoptysis.  Evidently he has been having hemoptysis now for over a year to a point where he was severely anemic on presentation.  He was transfused packed red blood cells and treated with antibiotics.  He did improve somewhat and was discharged home.  Patient states that his hemoptysis persists.  He denies any ongoing fever, chills or sweats.  He has had unintentional weight loss over the last year.  He does have pleuritic chest pain especially on the left side.  He is here today as follow up of that recent hospital stay.  He did complete oral antibiotics     Review of Systems as per history of present illness otherwise negative  Objective:      Physical Exam   Constitutional: He is oriented to person, place, and time. He appears well-developed. He appears cachectic.   HENT:   Head: Normocephalic.   Cardiovascular: Normal rate and regular rhythm.   Pulmonary/Chest: Normal expansion. He has no wheezes. He has rhonchi. He has rales.   Neurological: He is alert and oriented to person, place, and time.   Psychiatric: He has a normal mood and affect.   Nursing note and vitals reviewed.            6/12/2025     9:02 AM 6/4/2025     7:30 AM 6/4/2025     4:31 AM 6/4/2025    12:14 AM 6/3/2025     8:45 PM 6/3/2025     4:44 PM 6/3/2025     4:29 PM   Pulmonary Function Tests   SpO2 96 % 97 % 97 % 97 % 97 % 94 % 98 %   Height 5' 9" (1.753 m)               Weight 62 kg (136 lb 11 oz)               BMI (Calculated) 20.2                     Assessment:      1. Symptomatic anemia    2. Pneumonia of left lower lobe due to infectious organism    3. Hemoptysis       FINDINGS:  There is extensive consolidation involving the left upper and to a greater extent left lower lobes.  There is associated volume " loss with shift of the mediastinum to the left.     There is a small left pleural effusion.     The right lung is grossly clear with minor atelectasis in the lung base.     The cardiac size is normal.  There is moderate coronary artery calcification.     The great vessels appear normal.  The pulmonary arteries are mildly enlarged.  No large filling defect     In the upper abdomen no significant finding is appreciated.        Impression:   Extensive left consolidation consistent with pneumonia.  Associated volume loss with shift of the mediastinum to the left.  Small left pleural effusion     Plan:      Extensive left-sided pneumonia as demonstrated on recent CT  Severity of his pneumonia and chronicity of his hemoptysis is certainly worrisome for an ongoing infectious process not covered by current/recent antibiotic administration  I discussed the risks benefits and alternatives of bronchoscopy and recommended this to him to better determine source of his hemoptysis and the etiology of his pneumonia  He agreed and we will be scheduled for Monday the 16th at 11:00 a.m.  Preprocedure instructions and directions given  Further plans and any additional treatment we will be based on results of bronchoscopy procedure  Otherwise follow up with me in 4 weeks  I personally reviewed the above with the patient and/or family including test and radiology results. They voiced understanding and agreement with the above. Questions were answered to their apparent satisfaction.

## 2025-06-24 VITALS
OXYGEN SATURATION: 95 % | DIASTOLIC BLOOD PRESSURE: 61 MMHG | HEIGHT: 69 IN | BODY MASS INDEX: 19.11 KG/M2 | HEART RATE: 57 BPM | RESPIRATION RATE: 18 BRPM | SYSTOLIC BLOOD PRESSURE: 115 MMHG | TEMPERATURE: 99 F | WEIGHT: 129 LBS

## 2025-06-24 LAB — ACID FAST MOD KINY STN SPEC: NORMAL

## 2025-06-27 ENCOUNTER — HOSPITAL ENCOUNTER (OUTPATIENT)
Dept: RADIOLOGY | Facility: HOSPITAL | Age: 65
Discharge: HOME | End: 2025-06-27
Attending: PHYSICIAN ASSISTANT
Payer: MEDICARE

## 2025-06-27 ENCOUNTER — OFFICE VISIT (OUTPATIENT)
Dept: INTERNAL MEDICINE | Facility: CLINIC | Age: 65
End: 2025-06-27
Payer: MEDICARE

## 2025-06-27 ENCOUNTER — TELEPHONE (OUTPATIENT)
Dept: HEMATOLOGY/ONCOLOGY | Facility: CLINIC | Age: 65
End: 2025-06-27
Payer: MEDICARE

## 2025-06-27 VITALS
SYSTOLIC BLOOD PRESSURE: 110 MMHG | HEART RATE: 59 BPM | DIASTOLIC BLOOD PRESSURE: 60 MMHG | WEIGHT: 126.13 LBS | OXYGEN SATURATION: 96 % | TEMPERATURE: 94 F | RESPIRATION RATE: 20 BRPM | HEIGHT: 69 IN | BODY MASS INDEX: 18.68 KG/M2

## 2025-06-27 DIAGNOSIS — M25.551 RIGHT HIP PAIN: ICD-10-CM

## 2025-06-27 DIAGNOSIS — Z79.899 MEDICATION MANAGEMENT: ICD-10-CM

## 2025-06-27 DIAGNOSIS — Z13.220 ENCOUNTER FOR LIPID SCREENING FOR CARDIOVASCULAR DISEASE: ICD-10-CM

## 2025-06-27 DIAGNOSIS — M54.41 CHRONIC RIGHT-SIDED LOW BACK PAIN WITH RIGHT-SIDED SCIATICA: ICD-10-CM

## 2025-06-27 DIAGNOSIS — R79.89 ELEVATED BRAIN NATRIURETIC PEPTIDE (BNP) LEVEL: ICD-10-CM

## 2025-06-27 DIAGNOSIS — D64.9 SYMPTOMATIC ANEMIA: Primary | ICD-10-CM

## 2025-06-27 DIAGNOSIS — E55.9 VITAMIN D DEFICIENCY: ICD-10-CM

## 2025-06-27 DIAGNOSIS — Z13.6 ENCOUNTER FOR LIPID SCREENING FOR CARDIOVASCULAR DISEASE: ICD-10-CM

## 2025-06-27 DIAGNOSIS — G89.29 CHRONIC RIGHT-SIDED LOW BACK PAIN WITH RIGHT-SIDED SCIATICA: ICD-10-CM

## 2025-06-27 DIAGNOSIS — R63.4 WEIGHT LOSS: ICD-10-CM

## 2025-06-27 DIAGNOSIS — D64.9 ANEMIA: Primary | ICD-10-CM

## 2025-06-27 DIAGNOSIS — Z12.11 SCREEN FOR COLON CANCER: ICD-10-CM

## 2025-06-27 DIAGNOSIS — Z12.5 SCREENING FOR PROSTATE CANCER: ICD-10-CM

## 2025-06-27 DIAGNOSIS — R04.2 HEMOPTYSIS: ICD-10-CM

## 2025-06-27 LAB — BACTERIA SPEC ANAEROBE CULT: NORMAL

## 2025-06-27 PROCEDURE — 72100 X-RAY EXAM L-S SPINE 2/3 VWS: CPT | Mod: 26,,, | Performed by: RADIOLOGY

## 2025-06-27 PROCEDURE — 99999 PR PBB SHADOW E&M-EST. PATIENT-LVL V: CPT | Mod: PBBFAC,,, | Performed by: PHYSICIAN ASSISTANT

## 2025-06-27 PROCEDURE — 72100 X-RAY EXAM L-S SPINE 2/3 VWS: CPT | Mod: TC

## 2025-06-27 NOTE — PROGRESS NOTES
"Subjective:      Patient ID: Shaan Tucker is a 64 y.o. male.    Chief Complaint: Establish Care (He is here to est care. Needs to do labs and states he has been having problems with his right hip, causes pain and problems with walking. )    Patient is new to our clinic, would like to establish care with Dr. Ngo.     Recently admitted in May for symptomatic anemia, PNA, hemoptysis, elevated BNP (echo EF low normal 50-55%)  He has since followed up with Pulmonology, endoscopy performed 6/23, cultures still processing   Reports ongoing hemoptysis   Scheduled for f/u 7/10 to discuss management    R hip pain, ongoing ~2yrs   Evaluated at [facility] ER Feb 2025   X-ray "No acute fracture or dislocation. Joint spaces are preserved. No focal osseous lesion."  Denies seeing ortho specialist in the past   Walks with walker d/t instability of R hip     Also w lower back pain   Pain radiates down R leg, denies numbness/tingling     Smokes cigars, no long cigarettes but did until ~1yr @ pk/day for 50yrs  Denies alcohol or other drug use     Has not check up in quite some time       Review of Systems   Constitutional:  Positive for fatigue and unexpected weight change (lost ~20lbs over the past year, he reports large majority from hospitalization). Negative for chills, diaphoresis and fever.   HENT:  Negative for congestion, rhinorrhea and sore throat.    Respiratory:  Positive for cough. Negative for shortness of breath and wheezing.         +hemoptysis   2 cigars/day, ex-smoker cigarettes, quite 1yr ago, was smoking pk/day ~50yrs   Cardiovascular:  Negative for leg swelling.   Gastrointestinal:  Negative for abdominal pain, blood in stool, constipation, diarrhea, nausea and vomiting.   Musculoskeletal:  Positive for arthralgias.   Skin:  Negative for rash.   Neurological:  Negative for dizziness, light-headedness and headaches.   Hematological:  Does not bruise/bleed easily.       Objective:   /60 (BP Location: Left arm, " "Patient Position: Sitting)   Pulse (!) 59   Temp (!) 94.4 °F (34.7 °C) (Tympanic)   Resp 20   Ht 5' 9" (1.753 m)   Wt 57.2 kg (126 lb 1.7 oz)   SpO2 96%   BMI 18.62 kg/m²   Physical Exam  Constitutional:       General: He is not in acute distress.     Appearance: Normal appearance. He is well-developed and underweight. He is not ill-appearing or diaphoretic.   HENT:      Head: Normocephalic and atraumatic.      Right Ear: External ear normal.      Left Ear: External ear normal.   Eyes:      General: Lids are normal.         Right eye: No discharge.         Left eye: No discharge.      Conjunctiva/sclera: Conjunctivae normal.      Right eye: Right conjunctiva is not injected.      Left eye: Left conjunctiva is not injected.   Cardiovascular:      Rate and Rhythm: Normal rate and regular rhythm.      Heart sounds: Normal heart sounds. No murmur heard.  Pulmonary:      Effort: Pulmonary effort is normal. No respiratory distress.      Breath sounds: Rhonchi (generalized) present. No decreased breath sounds.   Musculoskeletal:      Lumbar back: Bony tenderness present.      Left hip: Bony tenderness present. Decreased range of motion.      Right lower leg: No edema.      Left lower leg: No edema.   Skin:     General: Skin is warm and dry.      Findings: No rash.   Neurological:      Mental Status: He is alert and oriented to person, place, and time.   Psychiatric:         Speech: Speech normal.         Behavior: Behavior normal.         Thought Content: Thought content normal.         Judgment: Judgment normal.       Assessment:      1. Symptomatic anemia    2. Weight loss    3. Hemoptysis    4. Medication management    5. Vitamin D deficiency    6. Encounter for lipid screening for cardiovascular disease    7. Right hip pain    8. Screen for colon cancer    9. Screening for prostate cancer    10. Chronic right-sided low back pain with right-sided sciatica    11. Elevated brain natriuretic peptide (BNP) level     "   Plan:   Symptomatic anemia  -     CBC Auto Differential; Future; Expected date: 06/27/2025  -     Ambulatory referral/consult to Hematology / Oncology; Future; Expected date: 07/04/2025    Weight loss  -     Comprehensive Metabolic Panel; Future; Expected date: 06/27/2025  -     TSH; Future; Expected date: 06/27/2025    Hemoptysis    Medication management  -     Hemoglobin A1C; Future; Expected date: 06/27/2025    Vitamin D deficiency  -     Vitamin D; Future; Expected date: 06/27/2025    Encounter for lipid screening for cardiovascular disease  -     Lipid Panel; Future; Expected date: 06/27/2025    Right hip pain  -     Ambulatory referral/consult to Orthopedics; Future; Expected date: 07/04/2025    Screen for colon cancer  -     Ambulatory referral/consult to Endo Procedure ; Future; Expected date: 06/27/2025    Screening for prostate cancer  -     PSA, Screening; Future; Expected date: 06/27/2025    Chronic right-sided low back pain with right-sided sciatica  -     X-Ray Lumbar Spine AP And Lateral; Future; Expected date: 06/27/2025    Elevated brain natriuretic peptide (BNP) level  -     BNP; Future; Expected date: 06/27/2025  -     Ambulatory referral/consult to Cardiology; Future; Expected date: 07/04/2025      Fasting labs     Close f/u w PCP to establish care     Stress importance of routine health screenings to be performed given weight loss and anemia     F/u Pulm as scheduled     Discussed worsening signs/symptoms and when to return to clinic or go to ED.   Patient expresses understanding and agrees with treatment plan.

## 2025-06-27 NOTE — TELEPHONE ENCOUNTER
----- Message from Nurse Lamar sent at 6/27/2025 11:30 AM CDT -----  Regarding: FW: Referral    ----- Message -----  From: Amalia Cox  Sent: 6/27/2025  11:28 AM CDT  To: [facility] Hem Onc Clinical Support  Subject: Referral                                         The patient has a referral to be seen for Symptomatic anemia and can be reached at the number on file for scheduling.    Thank you

## 2025-06-27 NOTE — TELEPHONE ENCOUNTER
Spoke to patient in reference to Hematology referral from Jeana Carpenter PA-C. Appointment scheduled per patient's request next available. Appointment notice mailed.

## 2025-06-30 ENCOUNTER — TELEPHONE (OUTPATIENT)
Dept: PAIN MEDICINE | Facility: CLINIC | Age: 65
End: 2025-06-30
Payer: MEDICARE

## 2025-06-30 ENCOUNTER — RESULTS FOLLOW-UP (OUTPATIENT)
Dept: INTERNAL MEDICINE | Facility: CLINIC | Age: 65
End: 2025-06-30

## 2025-07-01 ENCOUNTER — HOSPITAL ENCOUNTER (OUTPATIENT)
Dept: PREADMISSION TESTING | Facility: HOSPITAL | Age: 65
Discharge: HOME | End: 2025-07-01
Attending: INTERNAL MEDICINE
Payer: MEDICARE

## 2025-07-01 DIAGNOSIS — Z12.11 SCREEN FOR COLON CANCER: ICD-10-CM

## 2025-07-03 DIAGNOSIS — M25.551 BILATERAL HIP PAIN: Primary | ICD-10-CM

## 2025-07-03 DIAGNOSIS — M25.552 BILATERAL HIP PAIN: Primary | ICD-10-CM

## 2025-07-09 ENCOUNTER — TELEPHONE (OUTPATIENT)
Dept: CARDIOLOGY | Facility: CLINIC | Age: 65
End: 2025-07-09
Payer: MEDICARE

## 2025-07-09 NOTE — TELEPHONE ENCOUNTER
Spoke with patient's wife to confirm patient's appointment with  on 7/10/25. Patient's wife confirmed patient will see Dr. Patel and will reschedule Pulmonary appointments at [facility]. Patient's wife advised patient needs to bring all medications. She states patient is not taking any medications.

## 2025-07-10 ENCOUNTER — LAB VISIT (OUTPATIENT)
Dept: LAB | Facility: HOSPITAL | Age: 65
End: 2025-07-10
Attending: PHYSICIAN ASSISTANT
Payer: MEDICARE

## 2025-07-10 ENCOUNTER — OFFICE VISIT (OUTPATIENT)
Dept: CARDIOLOGY | Facility: CLINIC | Age: 65
End: 2025-07-10
Payer: MEDICARE

## 2025-07-10 VITALS
DIASTOLIC BLOOD PRESSURE: 70 MMHG | WEIGHT: 129.5 LBS | OXYGEN SATURATION: 98 % | BODY MASS INDEX: 19.13 KG/M2 | HEART RATE: 54 BPM | SYSTOLIC BLOOD PRESSURE: 130 MMHG

## 2025-07-10 DIAGNOSIS — E55.9 VITAMIN D DEFICIENCY: ICD-10-CM

## 2025-07-10 DIAGNOSIS — Z13.220 ENCOUNTER FOR LIPID SCREENING FOR CARDIOVASCULAR DISEASE: ICD-10-CM

## 2025-07-10 DIAGNOSIS — R07.9 CHEST PAIN, UNSPECIFIED TYPE: Primary | ICD-10-CM

## 2025-07-10 DIAGNOSIS — R04.2 HEMOPTYSIS: ICD-10-CM

## 2025-07-10 DIAGNOSIS — Z82.49 FAMILY HISTORY OF CARDIAC DISORDER: ICD-10-CM

## 2025-07-10 DIAGNOSIS — R79.89 ELEVATED BRAIN NATRIURETIC PEPTIDE (BNP) LEVEL: ICD-10-CM

## 2025-07-10 DIAGNOSIS — Z12.5 SCREENING FOR PROSTATE CANCER: ICD-10-CM

## 2025-07-10 DIAGNOSIS — Z13.6 ENCOUNTER FOR LIPID SCREENING FOR CARDIOVASCULAR DISEASE: ICD-10-CM

## 2025-07-10 DIAGNOSIS — I27.20 PULMONARY HYPERTENSION: ICD-10-CM

## 2025-07-10 DIAGNOSIS — I25.10 CORONARY ARTERY CALCIFICATION: ICD-10-CM

## 2025-07-10 DIAGNOSIS — R63.4 WEIGHT LOSS: ICD-10-CM

## 2025-07-10 DIAGNOSIS — D64.9 SYMPTOMATIC ANEMIA: ICD-10-CM

## 2025-07-10 DIAGNOSIS — Z79.899 MEDICATION MANAGEMENT: ICD-10-CM

## 2025-07-10 DIAGNOSIS — R06.09 OTHER FORM OF DYSPNEA: ICD-10-CM

## 2025-07-10 DIAGNOSIS — Z87.891 HISTORY OF SMOKING: ICD-10-CM

## 2025-07-10 DIAGNOSIS — D64.9 ANEMIA, UNSPECIFIED TYPE: ICD-10-CM

## 2025-07-10 LAB
25(OH)D3+25(OH)D2 SERPL-MCNC: 20 NG/ML (ref 30–96)
ABSOLUTE EOSINOPHIL (OHS): 0.54 K/UL
ABSOLUTE MONOCYTE (OHS): 0.6 K/UL (ref 0.3–1)
ABSOLUTE NEUTROPHIL COUNT (OHS): 4.79 K/UL (ref 1.8–7.7)
ALBUMIN SERPL BCP-MCNC: 3.5 G/DL (ref 3.5–5.2)
ALP SERPL-CCNC: 74 UNIT/L (ref 40–150)
ALT SERPL W/O P-5'-P-CCNC: 9 UNIT/L (ref 10–44)
ANION GAP (OHS): 7 MMOL/L (ref 8–16)
AST SERPL-CCNC: 16 UNIT/L (ref 11–45)
BASOPHILS # BLD AUTO: 0.06 K/UL
BASOPHILS NFR BLD AUTO: 0.8 %
BILIRUB SERPL-MCNC: 0.3 MG/DL (ref 0.1–1)
BNP SERPL-MCNC: 75 PG/ML (ref 0–99)
BUN SERPL-MCNC: 21 MG/DL (ref 8–23)
CALCIUM SERPL-MCNC: 9 MG/DL (ref 8.7–10.5)
CHLORIDE SERPL-SCNC: 108 MMOL/L (ref 95–110)
CHOLEST SERPL-MCNC: 145 MG/DL (ref 120–199)
CHOLEST/HDLC SERPL: 3 {RATIO} (ref 2–5)
CO2 SERPL-SCNC: 25 MMOL/L (ref 23–29)
CREAT SERPL-MCNC: 0.8 MG/DL (ref 0.5–1.4)
EAG (OHS): 80 MG/DL (ref 68–131)
ERYTHROCYTE [DISTWIDTH] IN BLOOD BY AUTOMATED COUNT: 19.8 % (ref 11.5–14.5)
GFR SERPLBLD CREATININE-BSD FMLA CKD-EPI: >60 ML/MIN/1.73/M2
GLUCOSE SERPL-MCNC: 79 MG/DL (ref 70–110)
HBA1C MFR BLD: 4.4 % (ref 4–5.6)
HCT VFR BLD AUTO: 30.9 % (ref 40–54)
HDLC SERPL-MCNC: 48 MG/DL (ref 40–75)
HDLC SERPL: 33.1 % (ref 20–50)
HGB BLD-MCNC: 8.8 GM/DL (ref 14–18)
IMM GRANULOCYTES # BLD AUTO: 0.02 K/UL (ref 0–0.04)
IMM GRANULOCYTES NFR BLD AUTO: 0.3 % (ref 0–0.5)
LDLC SERPL CALC-MCNC: 85 MG/DL (ref 63–159)
LYMPHOCYTES # BLD AUTO: 1.46 K/UL (ref 1–4.8)
MCH RBC QN AUTO: 24.9 PG (ref 27–31)
MCHC RBC AUTO-ENTMCNC: 28.5 G/DL (ref 32–36)
MCV RBC AUTO: 88 FL (ref 82–98)
NONHDLC SERPL-MCNC: 97 MG/DL
NUCLEATED RBC (/100WBC) (OHS): 0 /100 WBC
PLATELET # BLD AUTO: 287 K/UL (ref 150–450)
PMV BLD AUTO: 8.9 FL (ref 9.2–12.9)
POTASSIUM SERPL-SCNC: 4.9 MMOL/L (ref 3.5–5.1)
PROT SERPL-MCNC: 6.4 GM/DL (ref 6–8.4)
PSA SERPL-MCNC: 0.14 NG/ML
RBC # BLD AUTO: 3.53 M/UL (ref 4.6–6.2)
RELATIVE EOSINOPHIL (OHS): 7.2 %
RELATIVE LYMPHOCYTE (OHS): 19.5 % (ref 18–48)
RELATIVE MONOCYTE (OHS): 8 % (ref 4–15)
RELATIVE NEUTROPHIL (OHS): 64.2 % (ref 38–73)
SODIUM SERPL-SCNC: 140 MMOL/L (ref 136–145)
TRIGL SERPL-MCNC: 60 MG/DL (ref 30–150)
TSH SERPL-ACNC: 1.48 UIU/ML (ref 0.4–4)
WBC # BLD AUTO: 7.47 K/UL (ref 3.9–12.7)

## 2025-07-10 PROCEDURE — 36415 COLL VENOUS BLD VENIPUNCTURE: CPT

## 2025-07-10 PROCEDURE — G2211 COMPLEX E/M VISIT ADD ON: HCPCS | Mod: S$GLB,,, | Performed by: INTERNAL MEDICINE

## 2025-07-10 PROCEDURE — 99999 PR PBB SHADOW E&M-EST. PATIENT-LVL III: CPT | Mod: PBBFAC,,, | Performed by: INTERNAL MEDICINE

## 2025-07-10 PROCEDURE — 3288F FALL RISK ASSESSMENT DOCD: CPT | Mod: CPTII,S$GLB,, | Performed by: INTERNAL MEDICINE

## 2025-07-10 PROCEDURE — 80061 LIPID PANEL: CPT

## 2025-07-10 PROCEDURE — 3075F SYST BP GE 130 - 139MM HG: CPT | Mod: CPTII,S$GLB,, | Performed by: INTERNAL MEDICINE

## 2025-07-10 PROCEDURE — 80053 COMPREHEN METABOLIC PANEL: CPT

## 2025-07-10 PROCEDURE — 3078F DIAST BP <80 MM HG: CPT | Mod: CPTII,S$GLB,, | Performed by: INTERNAL MEDICINE

## 2025-07-10 PROCEDURE — 3008F BODY MASS INDEX DOCD: CPT | Mod: CPTII,S$GLB,, | Performed by: INTERNAL MEDICINE

## 2025-07-10 PROCEDURE — 82306 VITAMIN D 25 HYDROXY: CPT

## 2025-07-10 PROCEDURE — 84443 ASSAY THYROID STIM HORMONE: CPT

## 2025-07-10 PROCEDURE — 85025 COMPLETE CBC W/AUTO DIFF WBC: CPT

## 2025-07-10 PROCEDURE — 84153 ASSAY OF PSA TOTAL: CPT

## 2025-07-10 PROCEDURE — 1160F RVW MEDS BY RX/DR IN RCRD: CPT | Mod: CPTII,S$GLB,, | Performed by: INTERNAL MEDICINE

## 2025-07-10 PROCEDURE — 99205 OFFICE O/P NEW HI 60 MIN: CPT | Mod: S$GLB,,, | Performed by: INTERNAL MEDICINE

## 2025-07-10 PROCEDURE — 1101F PT FALLS ASSESS-DOCD LE1/YR: CPT | Mod: CPTII,S$GLB,, | Performed by: INTERNAL MEDICINE

## 2025-07-10 PROCEDURE — 83880 ASSAY OF NATRIURETIC PEPTIDE: CPT

## 2025-07-10 PROCEDURE — 83036 HEMOGLOBIN GLYCOSYLATED A1C: CPT

## 2025-07-10 PROCEDURE — 1159F MED LIST DOCD IN RCRD: CPT | Mod: CPTII,S$GLB,, | Performed by: INTERNAL MEDICINE

## 2025-07-10 RX ORDER — FOLIC ACID 1 MG/1
1 TABLET ORAL DAILY
Qty: 90 TABLET | Refills: 1 | Status: SHIPPED | OUTPATIENT
Start: 2025-07-10

## 2025-07-10 RX ORDER — FERROUS SULFATE 325(65) MG
325 TABLET ORAL
Qty: 90 TABLET | Refills: 1 | Status: SHIPPED | OUTPATIENT
Start: 2025-07-10

## 2025-07-10 NOTE — PROGRESS NOTES
Subjective:   Patient ID:  Shaan Tucker is a 64 y.o. male who presents for cardiac consult of No chief complaint on file.      Referral by: Jeana Carpenter, Martha  [id number] [facility] [place] [id number]     Reason for consult: CHF      HPI  The patient came in today for cardiac consult of No chief complaint on file.    7/10/25  Shaan Tucker is a 64 y.o. male pt with FH CVD, pulm HTN, Autism, tobacco use Fetal alcohol syndrome, and Seizures presents for CVD eval.     PT was admitted in May for hemoptysis.  ECHO 2025 revealed low normal EF, normal RV function, severe LA dilation with pulm HTN PAPS 41 mmHg.      Pt is still spitting up blood.   BP and HR stable. BMI 19 - 129 lbs   He used to smoke but has quit.   moderate coronary artery calcification noted on CT scan.     FH - father -  of MI, uncles  of MI    Results for orders placed during the hospital encounter of 25    Echo    Interpretation Summary    Left Ventricle: The left ventricle is mildly dilated. Normal wall thickness. There is low normal systolic function with a visually estimated ejection fraction of 50 - 55%. Ejection fraction is approximately 50%. There is normal diastolic function.    Right Ventricle: The right ventricle is normal in size Wall thickness is normal. Systolic function is normal.    Left Atrium: The left atrium is severely dilated    Pulmonary Artery: The estimated pulmonary artery systolic pressure is 41 mmHg.    IVC/SVC: Normal venous pressure at 3 mmHg.      No results found for this or any previous visit.      No results found for this or any previous visit.      No cardiac monitor results found for the past 12 months         Past Medical History:   Diagnosis Date    ADHD (attention deficit hyperactivity disorder)     Autism     Fetal alcohol syndrome     Seizures        Past Surgical History:   Procedure Laterality Date    CYST REMOVAL         Social History[1]    Family History   Problem  Relation Name Age of Onset    Diabetes Mellitus Mother      Hypertension Mother         Patient's Medications   New Prescriptions    FERROUS SULFATE (FEOSOL) 325 MG (65 MG IRON) TAB TABLET    Take 1 tablet (325 mg total) by mouth daily with breakfast.   Previous Medications    No medications on file   Modified Medications    Modified Medication Previous Medication    FOLIC ACID (FOLVITE) 1 MG TABLET folic acid (FOLVITE) 1 MG tablet       Take 1 tablet (1 mg total) by mouth once daily.    Take 1 tablet (1 mg total) by mouth once daily.   Discontinued Medications    No medications on file       Review of Systems   Constitutional:  Positive for malaise/fatigue.   HENT: Negative.     Eyes: Negative.    Respiratory:  Positive for shortness of breath.    Cardiovascular:  Positive for chest pain.   Gastrointestinal: Negative.    Genitourinary: Negative.    Musculoskeletal:  Positive for back pain and joint pain.   Skin: Negative.    Neurological: Negative.    Endo/Heme/Allergies: Negative.    Psychiatric/Behavioral: Negative.     All 12 systems otherwise negative.      Wt Readings from Last 3 Encounters:   07/10/25 58.7 kg (129 lb 8.3 oz)   06/27/25 57.2 kg (126 lb 1.7 oz)   06/23/25 58.5 kg (129 lb)     Temp Readings from Last 3 Encounters:   06/27/25 (!) 94.4 °F (34.7 °C) (Tympanic)   06/23/25 98.6 °F (37 °C)   06/04/25 98.4 °F (36.9 °C) (Oral)     BP Readings from Last 3 Encounters:   07/10/25 130/70   06/27/25 110/60   06/23/25 115/61     Pulse Readings from Last 3 Encounters:   07/10/25 (!) 54   06/27/25 (!) 59   06/23/25 (!) 57       /70 (BP Location: Right arm, Patient Position: Sitting)   Pulse (!) 54   Wt 58.7 kg (129 lb 8.3 oz)   SpO2 98%   BMI 19.13 kg/m²     Objective:   Physical Exam  Vitals and nursing note reviewed.   Constitutional:       General: He is not in acute distress.     Appearance: He is well-developed. He is not diaphoretic.   HENT:      Head: Normocephalic and atraumatic.      Nose:  Nose normal.   Eyes:      General: No scleral icterus.     Conjunctiva/sclera: Conjunctivae normal.   Neck:      Thyroid: No thyromegaly.      Vascular: No JVD.   Cardiovascular:      Rate and Rhythm: Normal rate and regular rhythm.      Heart sounds: S1 normal and S2 normal. Murmur heard.      No friction rub. No gallop. No S3 or S4 sounds.   Pulmonary:      Effort: Pulmonary effort is normal. No respiratory distress.      Breath sounds: Normal breath sounds. No stridor. No wheezing or rales.   Chest:      Chest wall: No tenderness.   Abdominal:      General: Bowel sounds are normal. There is no distension.      Palpations: Abdomen is soft. There is no mass.      Tenderness: There is no abdominal tenderness. There is no rebound.   Genitourinary:     Comments: Deferred  Musculoskeletal:         General: No tenderness or deformity. Normal range of motion.      Cervical back: Normal range of motion and neck supple.   Lymphadenopathy:      Cervical: No cervical adenopathy.   Skin:     General: Skin is warm and dry.      Coloration: Skin is not pale.      Findings: No erythema or rash.   Neurological:      Mental Status: He is alert and oriented to person, place, and time.      Motor: No abnormal muscle tone.      Coordination: Coordination normal.   Psychiatric:         Behavior: Behavior normal.         Thought Content: Thought content normal.         Judgment: Judgment normal.       Lab Results   Component Value Date     06/02/2025    K 4.0 06/02/2025     06/02/2025    CO2 20 (L) 06/02/2025    BUN 14 06/02/2025    CREATININE 0.7 06/02/2025    GLU 75 06/02/2025    AST 15 06/02/2025    ALT 7 (L) 06/02/2025    ALBUMIN 3.3 (L) 06/02/2025    PROT 6.2 06/02/2025    BILITOT 0.6 06/02/2025    WBC 8.31 06/04/2025    HGB 9.0 (L) 06/04/2025    HCT 31.5 (L) 06/04/2025    MCV 80 (L) 06/04/2025     06/04/2025     (H) 05/30/2025         BNP (pg/mL)   Date Value   05/30/2025 210 (H)          Assessment:       1. Chest pain, unspecified type    2. Hemoptysis    3. Symptomatic anemia    4. Pulmonary hypertension    5. Elevated brain natriuretic peptide (BNP) level    6. Anemia, unspecified type    7. Other form of dyspnea    8. Family history of cardiac disorder    9. History of smoking    10. Coronary artery calcification        Plan:       CHF/Elevated BNP with pulm HTN with strong FH CAD and occ CP/SOB/MON[place]- moderate coronary artery calcification.   - ECHO 2025 revealed low normal EF, normal RV function, severe LA dilation with pulm HTN PAPS 41 mmHg.    - order pharm nuc stress test, pt cannot walk on treadmill  - will discuss further tx     2. Anemia, hemoptysis, h/o smoking   - cont tx per heme/onc and pulm - s/p bronch   - start iron tabs    Visit today included increased complexity associated with the care of the episodic problem SOB addressed and managing the longitudinal care of the patient due to the serious and/or complex managed problem(s) .      Thank you for allowing me to participate in this patient's care. Please do not hesitate to contact me with any questions or concerns. Consult note has been forwarded to the referral physician.            [1]  Social History  Tobacco Use    Smoking status: Every Day     Current packs/day: 0.00     Average packs/day: 1 pack/day for 33.0 years (33.0 ttl pk-yrs)     Types: Cigarettes, Cigars     Start date: 1988     Last attempt to quit: 2021     Years since quittin.4    Smokeless tobacco: Never    Tobacco comments:     Cigarillos approximately 2 a day   Vaping Use    Vaping status: Never Used   Substance Use Topics    Alcohol use: Not Currently    Drug use: Never

## 2025-07-15 ENCOUNTER — TELEPHONE (OUTPATIENT)
Dept: PULMONOLOGY | Facility: CLINIC | Age: 65
End: 2025-07-15
Payer: MEDICARE

## 2025-07-16 ENCOUNTER — OFFICE VISIT (OUTPATIENT)
Dept: PULMONOLOGY | Facility: CLINIC | Age: 65
End: 2025-07-16
Payer: MEDICARE

## 2025-07-16 VITALS
BODY MASS INDEX: 18.33 KG/M2 | SYSTOLIC BLOOD PRESSURE: 120 MMHG | HEART RATE: 67 BPM | WEIGHT: 128.06 LBS | RESPIRATION RATE: 12 BRPM | HEIGHT: 70 IN | DIASTOLIC BLOOD PRESSURE: 70 MMHG | OXYGEN SATURATION: 99 %

## 2025-07-16 DIAGNOSIS — D64.9 ANEMIA, UNSPECIFIED TYPE: ICD-10-CM

## 2025-07-16 DIAGNOSIS — J18.0 DIFFUSE PNEUMONIA: Primary | ICD-10-CM

## 2025-07-16 DIAGNOSIS — J41.8 MIXED SIMPLE AND MUCOPURULENT CHRONIC BRONCHITIS: ICD-10-CM

## 2025-07-16 PROCEDURE — 3008F BODY MASS INDEX DOCD: CPT | Mod: CPTII,S$GLB,, | Performed by: INTERNAL MEDICINE

## 2025-07-16 PROCEDURE — 3288F FALL RISK ASSESSMENT DOCD: CPT | Mod: CPTII,S$GLB,, | Performed by: INTERNAL MEDICINE

## 2025-07-16 PROCEDURE — 3074F SYST BP LT 130 MM HG: CPT | Mod: CPTII,S$GLB,, | Performed by: INTERNAL MEDICINE

## 2025-07-16 PROCEDURE — 99214 OFFICE O/P EST MOD 30 MIN: CPT | Mod: S$GLB,,, | Performed by: INTERNAL MEDICINE

## 2025-07-16 PROCEDURE — 99999 PR PBB SHADOW E&M-EST. PATIENT-LVL III: CPT | Mod: PBBFAC,,, | Performed by: INTERNAL MEDICINE

## 2025-07-16 PROCEDURE — 1101F PT FALLS ASSESS-DOCD LE1/YR: CPT | Mod: CPTII,S$GLB,, | Performed by: INTERNAL MEDICINE

## 2025-07-16 PROCEDURE — 3078F DIAST BP <80 MM HG: CPT | Mod: CPTII,S$GLB,, | Performed by: INTERNAL MEDICINE

## 2025-07-16 PROCEDURE — 1160F RVW MEDS BY RX/DR IN RCRD: CPT | Mod: CPTII,S$GLB,, | Performed by: INTERNAL MEDICINE

## 2025-07-16 PROCEDURE — 3044F HG A1C LEVEL LT 7.0%: CPT | Mod: CPTII,S$GLB,, | Performed by: INTERNAL MEDICINE

## 2025-07-16 PROCEDURE — 1126F AMNT PAIN NOTED NONE PRSNT: CPT | Mod: CPTII,S$GLB,, | Performed by: INTERNAL MEDICINE

## 2025-07-16 PROCEDURE — 1159F MED LIST DOCD IN RCRD: CPT | Mod: CPTII,S$GLB,, | Performed by: INTERNAL MEDICINE

## 2025-07-16 RX ORDER — UMECLIDINIUM BROMIDE AND VILANTEROL TRIFENATATE 62.5; 25 UG/1; UG/1
1 POWDER RESPIRATORY (INHALATION) DAILY
Qty: 60 EACH | Refills: 6 | Status: SHIPPED | OUTPATIENT
Start: 2025-07-16

## 2025-07-16 RX ORDER — FERROUS SULFATE 325(65) MG
325 TABLET ORAL
Qty: 90 TABLET | Refills: 1 | Status: SHIPPED | OUTPATIENT
Start: 2025-07-16

## 2025-07-16 RX ORDER — DOXYCYCLINE 100 MG/1
100 CAPSULE ORAL EVERY 12 HOURS
Qty: 80 CAPSULE | Refills: 0 | Status: SHIPPED | OUTPATIENT
Start: 2025-07-16

## 2025-07-16 RX ORDER — PREDNISONE 10 MG/1
TABLET ORAL
Qty: 22 TABLET | Refills: 0 | Status: SHIPPED | OUTPATIENT
Start: 2025-07-16

## 2025-07-16 RX ORDER — ALBUTEROL SULFATE 90 UG/1
2 INHALANT RESPIRATORY (INHALATION) EVERY 6 HOURS PRN
Qty: 18 G | Refills: 3 | Status: SHIPPED | OUTPATIENT
Start: 2025-07-16

## 2025-07-16 RX ORDER — SULFAMETHOXAZOLE AND TRIMETHOPRIM 800; 160 MG/1; MG/1
1 TABLET ORAL 2 TIMES DAILY
Qty: 80 TABLET | Refills: 0 | Status: SHIPPED | OUTPATIENT
Start: 2025-07-16

## 2025-07-16 RX ORDER — FOLIC ACID 1 MG/1
1 TABLET ORAL DAILY
Qty: 90 TABLET | Refills: 1 | Status: SHIPPED | OUTPATIENT
Start: 2025-07-16

## 2025-07-16 NOTE — PROGRESS NOTES
"Subjective:      Patient ID: Shaan Tucker is a 64 y.o. male.    Chief Complaint: 4 wk F/U scope      HPI  June 2025  64-year-old male smoker who I saw recently in the hospital when he was admitted for pneumonia and hemoptysis. Evidently he has been having hemoptysis now for over a year to a point where he was severely anemic on presentation. He was transfused packed red blood cells and treated with antibiotics. He did improve somewhat and was discharged home. Patient states that his hemoptysis persists. He denies any ongoing fever, chills or sweats. He has had unintentional weight loss over the last year. He does have pleuritic chest pain especially on the left side. He is here today as follow up of that recent hospital stay. He did complete oral antibiotics     July 2025  Here for follow up of recent bronchoscopy  All cultures are negative  Continues to have ongoing hemoptysis.  Denies fever and chills  On bronchoscopy he had diffuse left-sided oozing that was nonspecific and from multiple airways  Recent labs showed continued anemia  Had a visit with Cardiology and has upcoming stress test as well    Review of Systems as per history of present illness otherwise negative  Objective:     Physical Exam   Constitutional: He is oriented to person, place, and time. He appears well-developed. No distress.   HENT:   Head: Normocephalic.   Cardiovascular: Normal rate and regular rhythm.   Pulmonary/Chest: Normal expansion. He has no wheezes. He has rhonchi. He has rales.   Musculoskeletal:      Cervical back: Neck supple.   Neurological: He is alert and oriented to person, place, and time.   Nursing note and vitals reviewed.            7/16/2025     9:21 AM 7/10/2025     8:44 AM 6/27/2025    10:57 AM 6/23/2025    12:26 PM 6/23/2025    12:01 PM 6/23/2025    11:51 AM 6/23/2025    11:41 AM   Pulmonary Function Tests   SpO2 99 % 98 % 96 % 95 % 97 % 100 % 100 %   Height 5' 10" (1.778 m)  5' 9" (1.753 m)       Weight 58.1 kg " (128 lb 1.4 oz) 58.7 kg (129 lb 8.3 oz) 57.2 kg (126 lb 1.7 oz)       BMI (Calculated) 18.4  18.6            Assessment:     1. Diffuse pneumonia    2. Anemia, unspecified type    3. Mixed simple and mucopurulent chronic bronchitis        Orders Placed This Encounter   Procedures    CT Chest Without Contrast     Standing Status:   Future     Expected Date:   8/28/2025     Expiration Date:   7/16/2026     May the Radiologist modify the order per protocol to meet the clinical needs of the patient?:   Yes         Plan:     At this point we will give a 6 week trial of broad antibiotic coverage with Bactrim and doxycycline  Two week course of low-dose prednisone taper 20 mg for 1 week and then 10 mg for 1 week  I will have him return at the completion of that course for repeat CT  Go to the ER should his hemoptysis worsen  We will add Anoro inhaled once daily with as needed albuterol  Against smoking cessation discussed and strongly counseled  Return in 6 weeks with a CT, sooner if necessary  I personally reviewed the above with the patient and/or family including test and radiology results. They voiced understanding and agreement with the above. Questions were answered to their apparent satisfaction.

## 2025-07-17 ENCOUNTER — HOSPITAL ENCOUNTER (OUTPATIENT)
Dept: PREADMISSION TESTING | Facility: HOSPITAL | Age: 65
Discharge: HOME | End: 2025-07-17
Attending: COLON & RECTAL SURGERY
Payer: MEDICARE

## 2025-07-24 ENCOUNTER — HOSPITAL ENCOUNTER (OUTPATIENT)
Dept: RADIOLOGY | Facility: HOSPITAL | Age: 65
Discharge: HOME | End: 2025-07-24
Attending: INTERNAL MEDICINE
Payer: MEDICARE

## 2025-07-24 ENCOUNTER — TELEPHONE (OUTPATIENT)
Dept: CARDIOLOGY | Facility: CLINIC | Age: 65
End: 2025-07-24
Payer: MEDICARE

## 2025-07-24 ENCOUNTER — HOSPITAL ENCOUNTER (OUTPATIENT)
Dept: PULMONOLOGY | Facility: HOSPITAL | Age: 65
Discharge: HOME | End: 2025-07-24
Attending: INTERNAL MEDICINE
Payer: MEDICARE

## 2025-07-24 VITALS
BODY MASS INDEX: 18.32 KG/M2 | DIASTOLIC BLOOD PRESSURE: 61 MMHG | SYSTOLIC BLOOD PRESSURE: 118 MMHG | HEART RATE: 53 BPM | HEIGHT: 70 IN | WEIGHT: 128 LBS

## 2025-07-24 DIAGNOSIS — R04.2 HEMOPTYSIS: ICD-10-CM

## 2025-07-24 DIAGNOSIS — I27.20 PULMONARY HYPERTENSION: ICD-10-CM

## 2025-07-24 DIAGNOSIS — R06.09 OTHER FORM OF DYSPNEA: ICD-10-CM

## 2025-07-24 DIAGNOSIS — Z87.891 HISTORY OF SMOKING: ICD-10-CM

## 2025-07-24 DIAGNOSIS — R07.9 CHEST PAIN, UNSPECIFIED TYPE: ICD-10-CM

## 2025-07-24 DIAGNOSIS — D64.9 ANEMIA, UNSPECIFIED TYPE: ICD-10-CM

## 2025-07-24 DIAGNOSIS — D64.9 SYMPTOMATIC ANEMIA: ICD-10-CM

## 2025-07-24 DIAGNOSIS — Z82.49 FAMILY HISTORY OF CARDIAC DISORDER: ICD-10-CM

## 2025-07-24 DIAGNOSIS — R79.89 ELEVATED BRAIN NATRIURETIC PEPTIDE (BNP) LEVEL: ICD-10-CM

## 2025-07-24 LAB
CV STRESS BASE HR: 54 BPM
DIASTOLIC BLOOD PRESSURE: 61 MMHG
EJECTION FRACTION- HIGH: 73 %
END DIASTOLIC INDEX-HIGH: 165 ML/M2
END DIASTOLIC INDEX-LOW: 101 ML/M2
END SYSTOLIC INDEX-HIGH: 64 ML/M2
END SYSTOLIC INDEX-LOW: 28 ML/M2
NUC REST EJECTION FRACTION: 50
NUC STRESS EJECTION FRACTION: 41 %
OHS CV CPX 85 PERCENT MAX PREDICTED HEART RATE MALE: 132
OHS CV CPX MAX PREDICTED HEART RATE: 155
OHS CV CPX PATIENT HEIGHT IN: 70
OHS CV CPX PATIENT IS FEMALE: 0
OHS CV CPX PATIENT IS MALE: 1
OHS CV CPX PEAK DIASTOLIC BLOOD PRESSURE: 61 MMHG
OHS CV CPX PEAK HEAR RATE: 68 BPM
OHS CV CPX PEAK RATE PRESSURE PRODUCT: 8024
OHS CV CPX PEAK SYSTOLIC BLOOD PRESSURE: 118 MMHG
OHS CV CPX PERCENT MAX PREDICTED HEART RATE ACHIEVED: 44
OHS CV CPX RATE PRESSURE PRODUCT PRESENTING: 6372
OHS CV INITIAL DOSE: 11.6 MCG/KG/MIN
OHS CV PEAK DOSE: 32.1 MCG/KG/MIN
RETIRED EF AND QEF - SEE NOTES: 59 %
SYSTOLIC BLOOD PRESSURE: 118 MMHG

## 2025-07-24 PROCEDURE — 63600175 PHARM REV CODE 636 W HCPCS: Performed by: INTERNAL MEDICINE

## 2025-07-24 PROCEDURE — A9502 TC99M TETROFOSMIN: HCPCS | Performed by: INTERNAL MEDICINE

## 2025-07-24 PROCEDURE — 93017 CV STRESS TEST TRACING ONLY: CPT

## 2025-07-24 RX ORDER — ATORVASTATIN CALCIUM 20 MG/1
20 TABLET, FILM COATED ORAL DAILY
Qty: 30 TABLET | Refills: 2 | Status: SHIPPED | OUTPATIENT
Start: 2025-07-24 | End: 2026-07-24

## 2025-07-24 RX ORDER — NITROGLYCERIN 0.4 MG/1
0.4 TABLET SUBLINGUAL EVERY 5 MIN PRN
Qty: 30 TABLET | Refills: 1 | Status: SHIPPED | OUTPATIENT
Start: 2025-07-24 | End: 2026-07-24

## 2025-07-24 RX ORDER — REGADENOSON 0.08 MG/ML
0.4 INJECTION, SOLUTION INTRAVENOUS ONCE
Status: COMPLETED | OUTPATIENT
Start: 2025-07-24 | End: 2025-07-24

## 2025-07-24 RX ORDER — ASPIRIN 81 MG/1
81 TABLET ORAL DAILY
Qty: 90 TABLET | Refills: 1 | Status: SHIPPED | OUTPATIENT
Start: 2025-07-24 | End: 2026-07-24

## 2025-07-24 RX ADMIN — TETROFOSMIN 32.1 MILLICURIE: 1.38 INJECTION, POWDER, LYOPHILIZED, FOR SOLUTION INTRAVENOUS at 09:07

## 2025-07-24 RX ADMIN — TETROFOSMIN 11.6 MILLICURIE: 1.38 INJECTION, POWDER, LYOPHILIZED, FOR SOLUTION INTRAVENOUS at 08:07

## 2025-07-24 RX ADMIN — REGADENOSON 0.4 MG: 0.08 INJECTION, SOLUTION INTRAVENOUS at 09:07

## 2025-07-24 NOTE — TELEPHONE ENCOUNTER
Spoke with patient to advise that per Dr. 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 Follow up in next few weeks to discuss further workup/treatment which may include an angiogram/heart cath. Patient and spouse voiced understanding. F/U appointment scheduled.      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 Follow up in next few weeks to discuss further workup/treatment which may include an angiogram/heart cath. Thanks   ----- Message -----  From: Silvestre Patel MD  Sent: 7/24/2025   1:08 PM CDT  To: Silvestre Patel MD

## 2025-08-10 ENCOUNTER — HOSPITAL ENCOUNTER (INPATIENT)
Facility: HOSPITAL | Age: 65
LOS: 3 days | Discharge: HOME HEALTH | DRG: 193 | End: 2025-08-13
Attending: EMERGENCY MEDICINE | Admitting: SPECIALIST
Payer: MEDICARE

## 2025-08-10 DIAGNOSIS — R06.02 SOB (SHORTNESS OF BREATH): ICD-10-CM

## 2025-08-10 DIAGNOSIS — Z13.6 SCREENING FOR CARDIOVASCULAR CONDITION: ICD-10-CM

## 2025-08-10 DIAGNOSIS — J18.9 PNEUMONIA OF LEFT LOWER LOBE DUE TO INFECTIOUS ORGANISM: Primary | ICD-10-CM

## 2025-08-10 DIAGNOSIS — R04.2 HEMOPTYSIS: ICD-10-CM

## 2025-08-10 PROBLEM — I27.20 PULMONARY HYPERTENSION: Status: ACTIVE | Noted: 2025-08-10

## 2025-08-10 LAB
ABO + RH BLD: NORMAL
ABSOLUTE EOSINOPHIL (OHS): 0 K/UL
ABSOLUTE EOSINOPHIL (OHS): 0 K/UL
ABSOLUTE EOSINOPHIL (OHS): 0.02 K/UL
ABSOLUTE EOSINOPHIL (OHS): 0.11 K/UL
ABSOLUTE MONOCYTE (OHS): 0.08 K/UL (ref 0.3–1)
ABSOLUTE MONOCYTE (OHS): 0.28 K/UL (ref 0.3–1)
ABSOLUTE MONOCYTE (OHS): 0.54 K/UL (ref 0.3–1)
ABSOLUTE MONOCYTE (OHS): 0.6 K/UL (ref 0.3–1)
ABSOLUTE NEUTROPHIL COUNT (OHS): 7.13 K/UL (ref 1.8–7.7)
ABSOLUTE NEUTROPHIL COUNT (OHS): 7.88 K/UL (ref 1.8–7.7)
ABSOLUTE NEUTROPHIL COUNT (OHS): 7.99 K/UL (ref 1.8–7.7)
ABSOLUTE NEUTROPHIL COUNT (OHS): 8.13 K/UL (ref 1.8–7.7)
ALBUMIN SERPL BCP-MCNC: 3.5 G/DL (ref 3.5–5.2)
ALP SERPL-CCNC: 71 UNIT/L (ref 40–150)
ALT SERPL W/O P-5'-P-CCNC: 11 UNIT/L (ref 10–44)
ANION GAP (OHS): 10 MMOL/L (ref 8–16)
AST SERPL-CCNC: 27 UNIT/L (ref 11–45)
BASOPHILS # BLD AUTO: 0.02 K/UL
BASOPHILS NFR BLD AUTO: 0.2 %
BILIRUB SERPL-MCNC: 0.4 MG/DL (ref 0.1–1)
BILIRUB UR QL STRIP.AUTO: NEGATIVE
BLD PROD TYP BPU: NORMAL
BLOOD UNIT EXPIRATION DATE: NORMAL
BLOOD UNIT TYPE CODE: 6200
BUN SERPL-MCNC: 18 MG/DL (ref 8–23)
CALCIUM SERPL-MCNC: 8.6 MG/DL (ref 8.7–10.5)
CHLORIDE SERPL-SCNC: 107 MMOL/L (ref 95–110)
CLARITY UR: CLEAR
CO2 SERPL-SCNC: 20 MMOL/L (ref 23–29)
COLOR UR AUTO: YELLOW
CREAT SERPL-MCNC: 0.9 MG/DL (ref 0.5–1.4)
CROSSMATCH INTERPRETATION: NORMAL
DISPENSE STATUS: NORMAL
ERYTHROCYTE [DISTWIDTH] IN BLOOD BY AUTOMATED COUNT: 18.8 % (ref 11.5–14.5)
ERYTHROCYTE [DISTWIDTH] IN BLOOD BY AUTOMATED COUNT: 18.8 % (ref 11.5–14.5)
ERYTHROCYTE [DISTWIDTH] IN BLOOD BY AUTOMATED COUNT: 19 % (ref 11.5–14.5)
ERYTHROCYTE [DISTWIDTH] IN BLOOD BY AUTOMATED COUNT: 19.9 % (ref 11.5–14.5)
GFR SERPLBLD CREATININE-BSD FMLA CKD-EPI: >60 ML/MIN/1.73/M2
GLUCOSE SERPL-MCNC: 96 MG/DL (ref 70–110)
GLUCOSE UR QL STRIP: ABNORMAL
HCT VFR BLD AUTO: 18 % (ref 40–54)
HCT VFR BLD AUTO: 22.3 % (ref 40–54)
HCT VFR BLD AUTO: 27.4 % (ref 40–54)
HCT VFR BLD AUTO: 27.5 % (ref 40–54)
HGB BLD-MCNC: 5 GM/DL (ref 14–18)
HGB BLD-MCNC: 6.8 GM/DL (ref 14–18)
HGB BLD-MCNC: 8.5 GM/DL (ref 14–18)
HGB BLD-MCNC: 8.5 GM/DL (ref 14–18)
HGB UR QL STRIP: NEGATIVE
IMM GRANULOCYTES # BLD AUTO: 0.05 K/UL (ref 0–0.04)
IMM GRANULOCYTES # BLD AUTO: 0.06 K/UL (ref 0–0.04)
IMM GRANULOCYTES # BLD AUTO: 0.07 K/UL (ref 0–0.04)
IMM GRANULOCYTES # BLD AUTO: 0.09 K/UL (ref 0–0.04)
IMM GRANULOCYTES NFR BLD AUTO: 0.6 % (ref 0–0.5)
IMM GRANULOCYTES NFR BLD AUTO: 0.7 % (ref 0–0.5)
IMM GRANULOCYTES NFR BLD AUTO: 0.8 % (ref 0–0.5)
IMM GRANULOCYTES NFR BLD AUTO: 0.9 % (ref 0–0.5)
INDIRECT COOMBS: NORMAL
INFLUENZA A MOLECULAR (OHS): NEGATIVE
INFLUENZA B MOLECULAR (OHS): NEGATIVE
INR PPP: 1 (ref 0.8–1.2)
KETONES UR QL STRIP: NEGATIVE
LACTATE SERPL-SCNC: 2.9 MMOL/L (ref 0.5–2.2)
LACTATE SERPL-SCNC: 3 MMOL/L (ref 0.5–2.2)
LEUKOCYTE ESTERASE UR QL STRIP: NEGATIVE
LIPASE SERPL-CCNC: 65 U/L (ref 4–60)
LYMPHOCYTES # BLD AUTO: 0.41 K/UL (ref 1–4.8)
LYMPHOCYTES # BLD AUTO: 0.47 K/UL (ref 1–4.8)
LYMPHOCYTES # BLD AUTO: 0.74 K/UL (ref 1–4.8)
LYMPHOCYTES # BLD AUTO: 1.13 K/UL (ref 1–4.8)
MAGNESIUM SERPL-MCNC: 2 MG/DL (ref 1.6–2.6)
MCH RBC QN AUTO: 21.5 PG (ref 27–31)
MCH RBC QN AUTO: 24.5 PG (ref 27–31)
MCH RBC QN AUTO: 24.9 PG (ref 27–31)
MCH RBC QN AUTO: 25.4 PG (ref 27–31)
MCHC RBC AUTO-ENTMCNC: 27.8 G/DL (ref 32–36)
MCHC RBC AUTO-ENTMCNC: 30.5 G/DL (ref 32–36)
MCHC RBC AUTO-ENTMCNC: 30.9 G/DL (ref 32–36)
MCHC RBC AUTO-ENTMCNC: 31 G/DL (ref 32–36)
MCV RBC AUTO: 77 FL (ref 82–98)
MCV RBC AUTO: 80 FL (ref 82–98)
MCV RBC AUTO: 81 FL (ref 82–98)
MCV RBC AUTO: 82 FL (ref 82–98)
NITRITE UR QL STRIP: NEGATIVE
NUCLEATED RBC (/100WBC) (OHS): 0 /100 WBC
OHS QRS DURATION: 98 MS
OHS QTC CALCULATION: 468 MS
PH UR STRIP: 6 [PH]
PLATELET # BLD AUTO: 198 K/UL (ref 150–450)
PLATELET # BLD AUTO: 207 K/UL (ref 150–450)
PLATELET # BLD AUTO: 211 K/UL (ref 150–450)
PLATELET # BLD AUTO: 225 K/UL (ref 150–450)
PMV BLD AUTO: 10 FL (ref 9.2–12.9)
PMV BLD AUTO: 9.3 FL (ref 9.2–12.9)
PMV BLD AUTO: 9.5 FL (ref 9.2–12.9)
PMV BLD AUTO: 9.8 FL (ref 9.2–12.9)
POTASSIUM SERPL-SCNC: 3.7 MMOL/L (ref 3.5–5.1)
PROCALCITONIN SERPL-MCNC: 0.08 NG/ML
PROT SERPL-MCNC: 5.9 GM/DL (ref 6–8.4)
PROT UR QL STRIP: NEGATIVE
PROTHROMBIN TIME: 11.4 SECONDS (ref 9–12.5)
RBC # BLD AUTO: 2.33 M/UL (ref 4.6–6.2)
RBC # BLD AUTO: 2.78 M/UL (ref 4.6–6.2)
RBC # BLD AUTO: 3.35 M/UL (ref 4.6–6.2)
RBC # BLD AUTO: 3.41 M/UL (ref 4.6–6.2)
RELATIVE EOSINOPHIL (OHS): 0 %
RELATIVE EOSINOPHIL (OHS): 0 %
RELATIVE EOSINOPHIL (OHS): 0.2 %
RELATIVE EOSINOPHIL (OHS): 1.1 %
RELATIVE LYMPHOCYTE (OHS): 11.6 % (ref 18–48)
RELATIVE LYMPHOCYTE (OHS): 4.8 % (ref 18–48)
RELATIVE LYMPHOCYTE (OHS): 5.3 % (ref 18–48)
RELATIVE LYMPHOCYTE (OHS): 8.6 % (ref 18–48)
RELATIVE MONOCYTE (OHS): 0.9 % (ref 4–15)
RELATIVE MONOCYTE (OHS): 3.1 % (ref 4–15)
RELATIVE MONOCYTE (OHS): 5.5 % (ref 4–15)
RELATIVE MONOCYTE (OHS): 7 % (ref 4–15)
RELATIVE NEUTROPHIL (OHS): 80.7 % (ref 38–73)
RELATIVE NEUTROPHIL (OHS): 83.4 % (ref 38–73)
RELATIVE NEUTROPHIL (OHS): 90.8 % (ref 38–73)
RELATIVE NEUTROPHIL (OHS): 93.2 % (ref 38–73)
RH BLD: NORMAL
SARS-COV-2 RDRP RESP QL NAA+PROBE: NEGATIVE
SODIUM SERPL-SCNC: 137 MMOL/L (ref 136–145)
SP GR UR STRIP: 1.02
SPECIMEN OUTDATE: NORMAL
TROPONIN I SERPL HS-MCNC: 6 NG/L
UNIT NUMBER: NORMAL
UROBILINOGEN UR STRIP-ACNC: NEGATIVE EU/DL
WBC # BLD AUTO: 8.56 K/UL (ref 3.9–12.7)
WBC # BLD AUTO: 8.58 K/UL (ref 3.9–12.7)
WBC # BLD AUTO: 8.95 K/UL (ref 3.9–12.7)
WBC # BLD AUTO: 9.77 K/UL (ref 3.9–12.7)

## 2025-08-10 PROCEDURE — 83690 ASSAY OF LIPASE: CPT | Performed by: EMERGENCY MEDICINE

## 2025-08-10 PROCEDURE — 84145 PROCALCITONIN (PCT): CPT | Performed by: EMERGENCY MEDICINE

## 2025-08-10 PROCEDURE — 63600175 PHARM REV CODE 636 W HCPCS: Mod: JZ,TB | Performed by: SPECIALIST

## 2025-08-10 PROCEDURE — 94640 AIRWAY INHALATION TREATMENT: CPT

## 2025-08-10 PROCEDURE — 25000242 PHARM REV CODE 250 ALT 637 W/ HCPCS: Performed by: SPECIALIST

## 2025-08-10 PROCEDURE — 36430 TRANSFUSION BLD/BLD COMPNT: CPT

## 2025-08-10 PROCEDURE — 83735 ASSAY OF MAGNESIUM: CPT | Performed by: EMERGENCY MEDICINE

## 2025-08-10 PROCEDURE — 83605 ASSAY OF LACTIC ACID: CPT | Performed by: EMERGENCY MEDICINE

## 2025-08-10 PROCEDURE — 87040 BLOOD CULTURE FOR BACTERIA: CPT | Performed by: EMERGENCY MEDICINE

## 2025-08-10 PROCEDURE — 27000221 HC OXYGEN, UP TO 24 HOURS

## 2025-08-10 PROCEDURE — 63600175 PHARM REV CODE 636 W HCPCS: Performed by: EMERGENCY MEDICINE

## 2025-08-10 PROCEDURE — 25000242 PHARM REV CODE 250 ALT 637 W/ HCPCS: Performed by: EMERGENCY MEDICINE

## 2025-08-10 PROCEDURE — 85025 COMPLETE CBC W/AUTO DIFF WBC: CPT | Performed by: NURSE PRACTITIONER

## 2025-08-10 PROCEDURE — 99285 EMERGENCY DEPT VISIT HI MDM: CPT | Mod: 25

## 2025-08-10 PROCEDURE — 85610 PROTHROMBIN TIME: CPT | Performed by: EMERGENCY MEDICINE

## 2025-08-10 PROCEDURE — 86920 COMPATIBILITY TEST SPIN: CPT | Performed by: EMERGENCY MEDICINE

## 2025-08-10 PROCEDURE — 87502 INFLUENZA DNA AMP PROBE: CPT | Performed by: EMERGENCY MEDICINE

## 2025-08-10 PROCEDURE — 20000000 HC ICU ROOM

## 2025-08-10 PROCEDURE — P9016 RBC LEUKOCYTES REDUCED: HCPCS | Performed by: NURSE PRACTITIONER

## 2025-08-10 PROCEDURE — 36415 COLL VENOUS BLD VENIPUNCTURE: CPT | Performed by: NURSE PRACTITIONER

## 2025-08-10 PROCEDURE — 25000003 PHARM REV CODE 250: Performed by: SPECIALIST

## 2025-08-10 PROCEDURE — 86920 COMPATIBILITY TEST SPIN: CPT | Performed by: NURSE PRACTITIONER

## 2025-08-10 PROCEDURE — 96365 THER/PROPH/DIAG IV INF INIT: CPT

## 2025-08-10 PROCEDURE — 84484 ASSAY OF TROPONIN QUANT: CPT | Performed by: EMERGENCY MEDICINE

## 2025-08-10 PROCEDURE — 94761 N-INVAS EAR/PLS OXIMETRY MLT: CPT

## 2025-08-10 PROCEDURE — U0002 COVID-19 LAB TEST NON-CDC: HCPCS | Performed by: EMERGENCY MEDICINE

## 2025-08-10 PROCEDURE — 93005 ELECTROCARDIOGRAM TRACING: CPT

## 2025-08-10 PROCEDURE — 93010 ELECTROCARDIOGRAM REPORT: CPT | Mod: ,,, | Performed by: INTERNAL MEDICINE

## 2025-08-10 PROCEDURE — 30233N1 TRANSFUSION OF NONAUTOLOGOUS RED BLOOD CELLS INTO PERIPHERAL VEIN, PERCUTANEOUS APPROACH: ICD-10-PCS | Performed by: EMERGENCY MEDICINE

## 2025-08-10 PROCEDURE — 81003 URINALYSIS AUTO W/O SCOPE: CPT | Performed by: EMERGENCY MEDICINE

## 2025-08-10 PROCEDURE — 94799 UNLISTED PULMONARY SVC/PX: CPT

## 2025-08-10 PROCEDURE — 99900035 HC TECH TIME PER 15 MIN (STAT)

## 2025-08-10 PROCEDURE — 85025 COMPLETE CBC W/AUTO DIFF WBC: CPT | Performed by: EMERGENCY MEDICINE

## 2025-08-10 PROCEDURE — 25000003 PHARM REV CODE 250: Performed by: NURSE PRACTITIONER

## 2025-08-10 PROCEDURE — 96366 THER/PROPH/DIAG IV INF ADDON: CPT

## 2025-08-10 PROCEDURE — P9016 RBC LEUKOCYTES REDUCED: HCPCS | Performed by: EMERGENCY MEDICINE

## 2025-08-10 PROCEDURE — 86900 BLOOD TYPING SEROLOGIC ABO: CPT | Performed by: EMERGENCY MEDICINE

## 2025-08-10 PROCEDURE — 63600175 PHARM REV CODE 636 W HCPCS: Performed by: SPECIALIST

## 2025-08-10 PROCEDURE — 25000003 PHARM REV CODE 250: Performed by: EMERGENCY MEDICINE

## 2025-08-10 PROCEDURE — 80053 COMPREHEN METABOLIC PANEL: CPT | Performed by: EMERGENCY MEDICINE

## 2025-08-10 RX ORDER — TRANEXAMIC ACID 1 G/10ML
500 INJECTION, SOLUTION INTRAVENOUS ONCE
Status: COMPLETED | OUTPATIENT
Start: 2025-08-10 | End: 2025-08-10

## 2025-08-10 RX ORDER — IPRATROPIUM BROMIDE AND ALBUTEROL SULFATE 2.5; .5 MG/3ML; MG/3ML
3 SOLUTION RESPIRATORY (INHALATION)
Status: COMPLETED | OUTPATIENT
Start: 2025-08-10 | End: 2025-08-10

## 2025-08-10 RX ORDER — ONDANSETRON HYDROCHLORIDE 2 MG/ML
4 INJECTION, SOLUTION INTRAVENOUS EVERY 6 HOURS PRN
Status: DISCONTINUED | OUTPATIENT
Start: 2025-08-10 | End: 2025-08-13 | Stop reason: HOSPADM

## 2025-08-10 RX ORDER — HYDROCODONE BITARTRATE AND ACETAMINOPHEN 500; 5 MG/1; MG/1
TABLET ORAL
Status: DISCONTINUED | OUTPATIENT
Start: 2025-08-10 | End: 2025-08-10

## 2025-08-10 RX ORDER — SODIUM CHLORIDE 0.9 % (FLUSH) 0.9 %
10 SYRINGE (ML) INJECTION
Status: DISCONTINUED | OUTPATIENT
Start: 2025-08-10 | End: 2025-08-13 | Stop reason: HOSPADM

## 2025-08-10 RX ORDER — TALC
9 POWDER (GRAM) TOPICAL NIGHTLY PRN
Status: DISCONTINUED | OUTPATIENT
Start: 2025-08-10 | End: 2025-08-13 | Stop reason: HOSPADM

## 2025-08-10 RX ORDER — ACETAMINOPHEN 325 MG/1
650 TABLET ORAL EVERY 4 HOURS PRN
Status: DISCONTINUED | OUTPATIENT
Start: 2025-08-10 | End: 2025-08-13 | Stop reason: HOSPADM

## 2025-08-10 RX ORDER — PANTOPRAZOLE SODIUM 40 MG/10ML
40 INJECTION, POWDER, LYOPHILIZED, FOR SOLUTION INTRAVENOUS 2 TIMES DAILY
Status: DISCONTINUED | OUTPATIENT
Start: 2025-08-10 | End: 2025-08-13 | Stop reason: HOSPADM

## 2025-08-10 RX ORDER — BUDESONIDE 0.5 MG/2ML
0.5 INHALANT ORAL EVERY 12 HOURS
Status: DISCONTINUED | OUTPATIENT
Start: 2025-08-10 | End: 2025-08-13 | Stop reason: HOSPADM

## 2025-08-10 RX ORDER — MUPIROCIN 20 MG/G
OINTMENT TOPICAL 2 TIMES DAILY
Status: DISCONTINUED | OUTPATIENT
Start: 2025-08-10 | End: 2025-08-13 | Stop reason: HOSPADM

## 2025-08-10 RX ADMIN — BUDESONIDE INHALATION 0.5 MG: 0.5 SUSPENSION RESPIRATORY (INHALATION) at 07:08

## 2025-08-10 RX ADMIN — Medication 9 MG: at 10:08

## 2025-08-10 RX ADMIN — PIPERACILLIN SODIUM AND TAZOBACTAM SODIUM 4.5 G: 4; .5 INJECTION, POWDER, FOR SOLUTION INTRAVENOUS at 05:08

## 2025-08-10 RX ADMIN — SODIUM CHLORIDE 2184 ML: 9 INJECTION, SOLUTION INTRAVENOUS at 05:08

## 2025-08-10 RX ADMIN — METHYLPREDNISOLONE SODIUM SUCCINATE 40 MG: 40 INJECTION, POWDER, FOR SOLUTION INTRAMUSCULAR; INTRAVENOUS at 09:08

## 2025-08-10 RX ADMIN — IPRATROPIUM BROMIDE AND ALBUTEROL SULFATE 3 ML: 2.5; .5 SOLUTION RESPIRATORY (INHALATION) at 04:08

## 2025-08-10 RX ADMIN — MUPIROCIN: 20 OINTMENT TOPICAL at 09:08

## 2025-08-10 RX ADMIN — METHYLPREDNISOLONE SODIUM SUCCINATE 40 MG: 40 INJECTION, POWDER, FOR SOLUTION INTRAMUSCULAR; INTRAVENOUS at 07:08

## 2025-08-10 RX ADMIN — PIPERACILLIN SODIUM AND TAZOBACTAM SODIUM 4.5 G: 4; .5 INJECTION, POWDER, FOR SOLUTION INTRAVENOUS at 01:08

## 2025-08-10 RX ADMIN — TRANEXAMIC ACID 500 MG: 100 INJECTION, SOLUTION INTRAVENOUS at 02:08

## 2025-08-10 RX ADMIN — PANTOPRAZOLE SODIUM 40 MG: 40 INJECTION, POWDER, FOR SOLUTION INTRAVENOUS at 08:08

## 2025-08-10 RX ADMIN — PANTOPRAZOLE SODIUM 40 MG: 40 INJECTION, POWDER, FOR SOLUTION INTRAVENOUS at 09:08

## 2025-08-10 RX ADMIN — PIPERACILLIN SODIUM AND TAZOBACTAM SODIUM 4.5 G: 4; .5 INJECTION, POWDER, FOR SOLUTION INTRAVENOUS at 09:08

## 2025-08-11 PROBLEM — M25.551 PAIN OF RIGHT HIP JOINT: Status: RESOLVED | Noted: 2024-07-22 | Resolved: 2025-08-11

## 2025-08-11 LAB
ABSOLUTE EOSINOPHIL (OHS): 0 K/UL
ABSOLUTE EOSINOPHIL (OHS): 0 K/UL
ABSOLUTE MONOCYTE (OHS): 0.14 K/UL (ref 0.3–1)
ABSOLUTE MONOCYTE (OHS): 0.2 K/UL (ref 0.3–1)
ABSOLUTE NEUTROPHIL COUNT (OHS): 8.04 K/UL (ref 1.8–7.7)
ABSOLUTE NEUTROPHIL COUNT (OHS): 8.13 K/UL (ref 1.8–7.7)
ALBUMIN SERPL BCP-MCNC: 3.4 G/DL (ref 3.5–5.2)
ALP SERPL-CCNC: 73 UNIT/L (ref 40–150)
ALT SERPL W/O P-5'-P-CCNC: 9 UNIT/L (ref 10–44)
ANION GAP (OHS): 8 MMOL/L (ref 8–16)
AST SERPL-CCNC: 17 UNIT/L (ref 11–45)
BASOPHILS # BLD AUTO: 0.01 K/UL
BASOPHILS # BLD AUTO: 0.02 K/UL
BASOPHILS NFR BLD AUTO: 0.1 %
BASOPHILS NFR BLD AUTO: 0.2 %
BILIRUB SERPL-MCNC: 1.6 MG/DL (ref 0.1–1)
BUN SERPL-MCNC: 14 MG/DL (ref 8–23)
CALCIUM SERPL-MCNC: 8.8 MG/DL (ref 8.7–10.5)
CHLORIDE SERPL-SCNC: 105 MMOL/L (ref 95–110)
CO2 SERPL-SCNC: 21 MMOL/L (ref 23–29)
CREAT SERPL-MCNC: 0.8 MG/DL (ref 0.5–1.4)
ERYTHROCYTE [DISTWIDTH] IN BLOOD BY AUTOMATED COUNT: 18.6 % (ref 11.5–14.5)
ERYTHROCYTE [DISTWIDTH] IN BLOOD BY AUTOMATED COUNT: 18.8 % (ref 11.5–14.5)
GFR SERPLBLD CREATININE-BSD FMLA CKD-EPI: >60 ML/MIN/1.73/M2
GLUCOSE SERPL-MCNC: 105 MG/DL (ref 70–110)
HCT VFR BLD AUTO: 28.8 % (ref 40–54)
HCT VFR BLD AUTO: 29.9 % (ref 40–54)
HGB BLD-MCNC: 9 GM/DL (ref 14–18)
HGB BLD-MCNC: 9.3 GM/DL (ref 14–18)
HOLD SPECIMEN: NORMAL
IMM GRANULOCYTES # BLD AUTO: 0.05 K/UL (ref 0–0.04)
IMM GRANULOCYTES # BLD AUTO: 0.06 K/UL (ref 0–0.04)
IMM GRANULOCYTES NFR BLD AUTO: 0.6 % (ref 0–0.5)
IMM GRANULOCYTES NFR BLD AUTO: 0.7 % (ref 0–0.5)
LYMPHOCYTES # BLD AUTO: 0.41 K/UL (ref 1–4.8)
LYMPHOCYTES # BLD AUTO: 0.49 K/UL (ref 1–4.8)
MAGNESIUM SERPL-MCNC: 1.9 MG/DL (ref 1.6–2.6)
MCH RBC QN AUTO: 24.7 PG (ref 27–31)
MCH RBC QN AUTO: 25.2 PG (ref 27–31)
MCHC RBC AUTO-ENTMCNC: 31.1 G/DL (ref 32–36)
MCHC RBC AUTO-ENTMCNC: 31.3 G/DL (ref 32–36)
MCV RBC AUTO: 80 FL (ref 82–98)
MCV RBC AUTO: 81 FL (ref 82–98)
NUCLEATED RBC (/100WBC) (OHS): 0 /100 WBC
NUCLEATED RBC (/100WBC) (OHS): 0 /100 WBC
PHOSPHATE SERPL-MCNC: 3.8 MG/DL (ref 2.7–4.5)
PLATELET # BLD AUTO: 227 K/UL (ref 150–450)
PLATELET # BLD AUTO: 235 K/UL (ref 150–450)
PMV BLD AUTO: 9.9 FL (ref 9.2–12.9)
PMV BLD AUTO: 9.9 FL (ref 9.2–12.9)
POTASSIUM SERPL-SCNC: 4.1 MMOL/L (ref 3.5–5.1)
PROT SERPL-MCNC: 6.1 GM/DL (ref 6–8.4)
RBC # BLD AUTO: 3.57 M/UL (ref 4.6–6.2)
RBC # BLD AUTO: 3.76 M/UL (ref 4.6–6.2)
RELATIVE EOSINOPHIL (OHS): 0 %
RELATIVE EOSINOPHIL (OHS): 0 %
RELATIVE LYMPHOCYTE (OHS): 4.7 % (ref 18–48)
RELATIVE LYMPHOCYTE (OHS): 5.5 % (ref 18–48)
RELATIVE MONOCYTE (OHS): 1.6 % (ref 4–15)
RELATIVE MONOCYTE (OHS): 2.2 % (ref 4–15)
RELATIVE NEUTROPHIL (OHS): 91.5 % (ref 38–73)
RELATIVE NEUTROPHIL (OHS): 92.9 % (ref 38–73)
SODIUM SERPL-SCNC: 134 MMOL/L (ref 136–145)
WBC # BLD AUTO: 8.66 K/UL (ref 3.9–12.7)
WBC # BLD AUTO: 8.89 K/UL (ref 3.9–12.7)

## 2025-08-11 PROCEDURE — 85025 COMPLETE CBC W/AUTO DIFF WBC: CPT | Performed by: INTERNAL MEDICINE

## 2025-08-11 PROCEDURE — 25000003 PHARM REV CODE 250: Performed by: SPECIALIST

## 2025-08-11 PROCEDURE — 11000001 HC ACUTE MED/SURG PRIVATE ROOM

## 2025-08-11 PROCEDURE — 83735 ASSAY OF MAGNESIUM: CPT | Performed by: NURSE PRACTITIONER

## 2025-08-11 PROCEDURE — 25000242 PHARM REV CODE 250 ALT 637 W/ HCPCS: Performed by: SPECIALIST

## 2025-08-11 PROCEDURE — 94640 AIRWAY INHALATION TREATMENT: CPT

## 2025-08-11 PROCEDURE — 84100 ASSAY OF PHOSPHORUS: CPT | Performed by: NURSE PRACTITIONER

## 2025-08-11 PROCEDURE — 99223 1ST HOSP IP/OBS HIGH 75: CPT | Mod: ,,, | Performed by: PHYSICIAN ASSISTANT

## 2025-08-11 PROCEDURE — 27000221 HC OXYGEN, UP TO 24 HOURS

## 2025-08-11 PROCEDURE — 85025 COMPLETE CBC W/AUTO DIFF WBC: CPT | Performed by: NURSE PRACTITIONER

## 2025-08-11 PROCEDURE — 63600175 PHARM REV CODE 636 W HCPCS: Performed by: NURSE PRACTITIONER

## 2025-08-11 PROCEDURE — 80053 COMPREHEN METABOLIC PANEL: CPT | Performed by: NURSE PRACTITIONER

## 2025-08-11 PROCEDURE — 94761 N-INVAS EAR/PLS OXIMETRY MLT: CPT

## 2025-08-11 PROCEDURE — 99900035 HC TECH TIME PER 15 MIN (STAT)

## 2025-08-11 PROCEDURE — 36415 COLL VENOUS BLD VENIPUNCTURE: CPT | Performed by: NURSE PRACTITIONER

## 2025-08-11 PROCEDURE — 36415 COLL VENOUS BLD VENIPUNCTURE: CPT | Performed by: INTERNAL MEDICINE

## 2025-08-11 PROCEDURE — 63600175 PHARM REV CODE 636 W HCPCS: Mod: JZ,TB | Performed by: SPECIALIST

## 2025-08-11 PROCEDURE — 99223 1ST HOSP IP/OBS HIGH 75: CPT | Mod: ,,, | Performed by: INTERNAL MEDICINE

## 2025-08-11 RX ORDER — HYDROXYZINE PAMOATE 25 MG/1
25 CAPSULE ORAL NIGHTLY PRN
Status: DISCONTINUED | OUTPATIENT
Start: 2025-08-11 | End: 2025-08-13 | Stop reason: HOSPADM

## 2025-08-11 RX ORDER — CEFTRIAXONE 2 G/1
2 INJECTION, POWDER, FOR SOLUTION INTRAMUSCULAR; INTRAVENOUS
Status: DISCONTINUED | OUTPATIENT
Start: 2025-08-11 | End: 2025-08-13 | Stop reason: HOSPADM

## 2025-08-11 RX ADMIN — PIPERACILLIN SODIUM AND TAZOBACTAM SODIUM 4.5 G: 4; .5 INJECTION, POWDER, FOR SOLUTION INTRAVENOUS at 06:08

## 2025-08-11 RX ADMIN — BUDESONIDE INHALATION 0.5 MG: 0.5 SUSPENSION RESPIRATORY (INHALATION) at 08:08

## 2025-08-11 RX ADMIN — MUPIROCIN: 20 OINTMENT TOPICAL at 09:08

## 2025-08-11 RX ADMIN — BUDESONIDE INHALATION 0.5 MG: 0.5 SUSPENSION RESPIRATORY (INHALATION) at 07:08

## 2025-08-11 RX ADMIN — PANTOPRAZOLE SODIUM 40 MG: 40 INJECTION, POWDER, FOR SOLUTION INTRAVENOUS at 09:08

## 2025-08-11 RX ADMIN — CEFTRIAXONE 2 G: 2 INJECTION, POWDER, FOR SOLUTION INTRAMUSCULAR; INTRAVENOUS at 03:08

## 2025-08-11 RX ADMIN — METHYLPREDNISOLONE SODIUM SUCCINATE 40 MG: 40 INJECTION, POWDER, FOR SOLUTION INTRAMUSCULAR; INTRAVENOUS at 09:08

## 2025-08-12 ENCOUNTER — ANESTHESIA (OUTPATIENT)
Dept: ENDOSCOPY | Facility: HOSPITAL | Age: 65
End: 2025-08-12
Payer: MEDICARE

## 2025-08-12 ENCOUNTER — ANESTHESIA EVENT (OUTPATIENT)
Dept: ENDOSCOPY | Facility: HOSPITAL | Age: 65
End: 2025-08-12
Payer: MEDICARE

## 2025-08-12 PROBLEM — E87.1 HYPONATREMIA: Status: ACTIVE | Noted: 2025-08-12

## 2025-08-12 LAB
ABSOLUTE EOSINOPHIL (OHS): 0.01 K/UL
ABSOLUTE MONOCYTE (OHS): 0.63 K/UL (ref 0.3–1)
ABSOLUTE NEUTROPHIL COUNT (OHS): 8.12 K/UL (ref 1.8–7.7)
ALBUMIN SERPL BCP-MCNC: 3.6 G/DL (ref 3.5–5.2)
ALP SERPL-CCNC: 65 UNIT/L (ref 40–150)
ALT SERPL W/O P-5'-P-CCNC: 9 UNIT/L (ref 10–44)
ANION GAP (OHS): 7 MMOL/L (ref 8–16)
AST SERPL-CCNC: 16 UNIT/L (ref 11–45)
BASOPHILS # BLD AUTO: 0.01 K/UL
BASOPHILS NFR BLD AUTO: 0.1 %
BILIRUB SERPL-MCNC: 0.8 MG/DL (ref 0.1–1)
BUN SERPL-MCNC: 20 MG/DL (ref 8–23)
CALCIUM SERPL-MCNC: 9 MG/DL (ref 8.7–10.5)
CHLORIDE SERPL-SCNC: 106 MMOL/L (ref 95–110)
CO2 SERPL-SCNC: 23 MMOL/L (ref 23–29)
CREAT SERPL-MCNC: 0.7 MG/DL (ref 0.5–1.4)
ERYTHROCYTE [DISTWIDTH] IN BLOOD BY AUTOMATED COUNT: 19.8 % (ref 11.5–14.5)
GFR SERPLBLD CREATININE-BSD FMLA CKD-EPI: >60 ML/MIN/1.73/M2
GLUCOSE SERPL-MCNC: 97 MG/DL (ref 70–110)
HCT VFR BLD AUTO: 27.6 % (ref 40–54)
HGB BLD-MCNC: 8.5 GM/DL (ref 14–18)
IMM GRANULOCYTES # BLD AUTO: 0.07 K/UL (ref 0–0.04)
IMM GRANULOCYTES NFR BLD AUTO: 0.7 % (ref 0–0.5)
LYMPHOCYTES # BLD AUTO: 1.19 K/UL (ref 1–4.8)
MAGNESIUM SERPL-MCNC: 1.9 MG/DL (ref 1.6–2.6)
MCH RBC QN AUTO: 24.9 PG (ref 27–31)
MCHC RBC AUTO-ENTMCNC: 30.8 G/DL (ref 32–36)
MCV RBC AUTO: 81 FL (ref 82–98)
NUCLEATED RBC (/100WBC) (OHS): 0 /100 WBC
PHOSPHATE SERPL-MCNC: 3.3 MG/DL (ref 2.7–4.5)
PLATELET # BLD AUTO: 227 K/UL (ref 150–450)
PMV BLD AUTO: 9.2 FL (ref 9.2–12.9)
POTASSIUM SERPL-SCNC: 4 MMOL/L (ref 3.5–5.1)
PROT SERPL-MCNC: 6.2 GM/DL (ref 6–8.4)
RBC # BLD AUTO: 3.42 M/UL (ref 4.6–6.2)
RELATIVE EOSINOPHIL (OHS): 0.1 %
RELATIVE LYMPHOCYTE (OHS): 11.9 % (ref 18–48)
RELATIVE MONOCYTE (OHS): 6.3 % (ref 4–15)
RELATIVE NEUTROPHIL (OHS): 80.9 % (ref 38–73)
SODIUM SERPL-SCNC: 136 MMOL/L (ref 136–145)
WBC # BLD AUTO: 10.03 K/UL (ref 3.9–12.7)

## 2025-08-12 PROCEDURE — 84100 ASSAY OF PHOSPHORUS: CPT | Performed by: NURSE PRACTITIONER

## 2025-08-12 PROCEDURE — 88305 TISSUE EXAM BY PATHOLOGIST: CPT | Mod: 26,,, | Performed by: STUDENT IN AN ORGANIZED HEALTH CARE EDUCATION/TRAINING PROGRAM

## 2025-08-12 PROCEDURE — 27000221 HC OXYGEN, UP TO 24 HOURS

## 2025-08-12 PROCEDURE — 11000001 HC ACUTE MED/SURG PRIVATE ROOM

## 2025-08-12 PROCEDURE — 63600175 PHARM REV CODE 636 W HCPCS: Performed by: NURSE ANESTHETIST, CERTIFIED REGISTERED

## 2025-08-12 PROCEDURE — 88342 IMHCHEM/IMCYTCHM 1ST ANTB: CPT | Mod: 26,,, | Performed by: STUDENT IN AN ORGANIZED HEALTH CARE EDUCATION/TRAINING PROGRAM

## 2025-08-12 PROCEDURE — 99900035 HC TECH TIME PER 15 MIN (STAT)

## 2025-08-12 PROCEDURE — 0DB78ZX EXCISION OF STOMACH, PYLORUS, VIA NATURAL OR ARTIFICIAL OPENING ENDOSCOPIC, DIAGNOSTIC: ICD-10-PCS | Performed by: INTERNAL MEDICINE

## 2025-08-12 PROCEDURE — 25000242 PHARM REV CODE 250 ALT 637 W/ HCPCS: Performed by: INTERNAL MEDICINE

## 2025-08-12 PROCEDURE — 36415 COLL VENOUS BLD VENIPUNCTURE: CPT | Performed by: FAMILY MEDICINE

## 2025-08-12 PROCEDURE — 25000242 PHARM REV CODE 250 ALT 637 W/ HCPCS: Performed by: SPECIALIST

## 2025-08-12 PROCEDURE — 94761 N-INVAS EAR/PLS OXIMETRY MLT: CPT

## 2025-08-12 PROCEDURE — 63600175 PHARM REV CODE 636 W HCPCS: Performed by: INTERNAL MEDICINE

## 2025-08-12 PROCEDURE — 94640 AIRWAY INHALATION TREATMENT: CPT

## 2025-08-12 PROCEDURE — 27201012 HC FORCEPS, HOT/COLD, DISP: Performed by: SPECIALIST

## 2025-08-12 PROCEDURE — 85025 COMPLETE CBC W/AUTO DIFF WBC: CPT | Performed by: FAMILY MEDICINE

## 2025-08-12 PROCEDURE — 84295 ASSAY OF SERUM SODIUM: CPT | Performed by: NURSE PRACTITIONER

## 2025-08-12 PROCEDURE — 25000003 PHARM REV CODE 250

## 2025-08-12 PROCEDURE — 83735 ASSAY OF MAGNESIUM: CPT | Performed by: NURSE PRACTITIONER

## 2025-08-12 PROCEDURE — 43239 EGD BIOPSY SINGLE/MULTIPLE: CPT | Performed by: INTERNAL MEDICINE

## 2025-08-12 PROCEDURE — 88305 TISSUE EXAM BY PATHOLOGIST: CPT | Mod: TC | Performed by: INTERNAL MEDICINE

## 2025-08-12 PROCEDURE — 25000003 PHARM REV CODE 250: Performed by: INTERNAL MEDICINE

## 2025-08-12 PROCEDURE — 63600175 PHARM REV CODE 636 W HCPCS: Mod: JZ,TB | Performed by: SPECIALIST

## 2025-08-12 PROCEDURE — 36415 COLL VENOUS BLD VENIPUNCTURE: CPT | Performed by: NURSE PRACTITIONER

## 2025-08-12 RX ORDER — PROPOFOL 10 MG/ML
VIAL (ML) INTRAVENOUS
Status: DISCONTINUED | OUTPATIENT
Start: 2025-08-12 | End: 2025-08-12

## 2025-08-12 RX ORDER — HYDROCODONE BITARTRATE AND ACETAMINOPHEN 5; 325 MG/1; MG/1
1 TABLET ORAL EVERY 6 HOURS PRN
Refills: 0 | Status: DISCONTINUED | OUTPATIENT
Start: 2025-08-12 | End: 2025-08-13 | Stop reason: HOSPADM

## 2025-08-12 RX ORDER — PREDNISONE 20 MG/1
40 TABLET ORAL DAILY
Status: DISCONTINUED | OUTPATIENT
Start: 2025-08-13 | End: 2025-08-13 | Stop reason: HOSPADM

## 2025-08-12 RX ORDER — IPRATROPIUM BROMIDE AND ALBUTEROL SULFATE 2.5; .5 MG/3ML; MG/3ML
3 SOLUTION RESPIRATORY (INHALATION)
Status: DISCONTINUED | OUTPATIENT
Start: 2025-08-12 | End: 2025-08-13 | Stop reason: HOSPADM

## 2025-08-12 RX ORDER — LIDOCAINE HYDROCHLORIDE 10 MG/ML
INJECTION, SOLUTION EPIDURAL; INFILTRATION; INTRACAUDAL; PERINEURAL
Status: DISCONTINUED | OUTPATIENT
Start: 2025-08-12 | End: 2025-08-12

## 2025-08-12 RX ADMIN — METHYLPREDNISOLONE SODIUM SUCCINATE 40 MG: 40 INJECTION, POWDER, FOR SOLUTION INTRAMUSCULAR; INTRAVENOUS at 08:08

## 2025-08-12 RX ADMIN — PANTOPRAZOLE SODIUM 40 MG: 40 INJECTION, POWDER, FOR SOLUTION INTRAVENOUS at 08:08

## 2025-08-12 RX ADMIN — HYDROCODONE BITARTRATE AND ACETAMINOPHEN 1 TABLET: 5; 325 TABLET ORAL at 04:08

## 2025-08-12 RX ADMIN — LIDOCAINE HYDROCHLORIDE 50 MG: 10 SOLUTION INTRAVENOUS at 10:08

## 2025-08-12 RX ADMIN — BUDESONIDE INHALATION 0.5 MG: 0.5 SUSPENSION RESPIRATORY (INHALATION) at 07:08

## 2025-08-12 RX ADMIN — CEFTRIAXONE 2 G: 2 INJECTION, POWDER, FOR SOLUTION INTRAMUSCULAR; INTRAVENOUS at 02:08

## 2025-08-12 RX ADMIN — IPRATROPIUM BROMIDE AND ALBUTEROL SULFATE 3 ML: 2.5; .5 SOLUTION RESPIRATORY (INHALATION) at 02:08

## 2025-08-12 RX ADMIN — IPRATROPIUM BROMIDE AND ALBUTEROL SULFATE 3 ML: 2.5; .5 SOLUTION RESPIRATORY (INHALATION) at 07:08

## 2025-08-12 RX ADMIN — PROPOFOL 30 MG: 10 INJECTION, EMULSION INTRAVENOUS at 10:08

## 2025-08-12 RX ADMIN — MUPIROCIN: 20 OINTMENT TOPICAL at 08:08

## 2025-08-12 RX ADMIN — HYDROCODONE BITARTRATE AND ACETAMINOPHEN 1 TABLET: 5; 325 TABLET ORAL at 10:08

## 2025-08-12 RX ADMIN — PROPOFOL 70 MG: 10 INJECTION, EMULSION INTRAVENOUS at 10:08

## 2025-08-12 RX ADMIN — PROPOFOL 40 MG: 10 INJECTION, EMULSION INTRAVENOUS at 10:08

## 2025-08-12 RX ADMIN — PANTOPRAZOLE SODIUM 40 MG: 40 INJECTION, POWDER, FOR SOLUTION INTRAVENOUS at 10:08

## 2025-08-13 VITALS
DIASTOLIC BLOOD PRESSURE: 64 MMHG | SYSTOLIC BLOOD PRESSURE: 109 MMHG | TEMPERATURE: 99 F | HEIGHT: 70 IN | BODY MASS INDEX: 18.06 KG/M2 | RESPIRATION RATE: 16 BRPM | WEIGHT: 126.13 LBS | HEART RATE: 79 BPM | OXYGEN SATURATION: 97 %

## 2025-08-13 LAB
ABSOLUTE EOSINOPHIL (OHS): 0.14 K/UL
ABSOLUTE MONOCYTE (OHS): 0.6 K/UL (ref 0.3–1)
ABSOLUTE NEUTROPHIL COUNT (OHS): 6.21 K/UL (ref 1.8–7.7)
ALBUMIN SERPL BCP-MCNC: 3.3 G/DL (ref 3.5–5.2)
ALP SERPL-CCNC: 59 UNIT/L (ref 40–150)
ALT SERPL W/O P-5'-P-CCNC: 8 UNIT/L (ref 10–44)
ANION GAP (OHS): 7 MMOL/L (ref 8–16)
AST SERPL-CCNC: 12 UNIT/L (ref 11–45)
BASOPHILS # BLD AUTO: 0.02 K/UL
BASOPHILS NFR BLD AUTO: 0.2 %
BILIRUB SERPL-MCNC: 0.4 MG/DL (ref 0.1–1)
BUN SERPL-MCNC: 19 MG/DL (ref 8–23)
CALCIUM SERPL-MCNC: 8.5 MG/DL (ref 8.7–10.5)
CHLORIDE SERPL-SCNC: 105 MMOL/L (ref 95–110)
CO2 SERPL-SCNC: 26 MMOL/L (ref 23–29)
CREAT SERPL-MCNC: 0.7 MG/DL (ref 0.5–1.4)
ERYTHROCYTE [DISTWIDTH] IN BLOOD BY AUTOMATED COUNT: 20.9 % (ref 11.5–14.5)
GFR SERPLBLD CREATININE-BSD FMLA CKD-EPI: >60 ML/MIN/1.73/M2
GLUCOSE SERPL-MCNC: 196 MG/DL (ref 70–110)
HCT VFR BLD AUTO: 29.4 % (ref 40–54)
HGB BLD-MCNC: 8.6 GM/DL (ref 14–18)
IMM GRANULOCYTES # BLD AUTO: 0.07 K/UL (ref 0–0.04)
IMM GRANULOCYTES NFR BLD AUTO: 0.8 % (ref 0–0.5)
LYMPHOCYTES # BLD AUTO: 1.37 K/UL (ref 1–4.8)
MAGNESIUM SERPL-MCNC: 2 MG/DL (ref 1.6–2.6)
MCH RBC QN AUTO: 24.6 PG (ref 27–31)
MCHC RBC AUTO-ENTMCNC: 29.3 G/DL (ref 32–36)
MCV RBC AUTO: 84 FL (ref 82–98)
NUCLEATED RBC (/100WBC) (OHS): 0 /100 WBC
PHOSPHATE SERPL-MCNC: 2.9 MG/DL (ref 2.7–4.5)
PLATELET # BLD AUTO: 236 K/UL (ref 150–450)
PMV BLD AUTO: 9.4 FL (ref 9.2–12.9)
POTASSIUM SERPL-SCNC: 3.6 MMOL/L (ref 3.5–5.1)
PROT SERPL-MCNC: 5.6 GM/DL (ref 6–8.4)
RBC # BLD AUTO: 3.5 M/UL (ref 4.6–6.2)
RELATIVE EOSINOPHIL (OHS): 1.7 %
RELATIVE LYMPHOCYTE (OHS): 16.3 % (ref 18–48)
RELATIVE MONOCYTE (OHS): 7.1 % (ref 4–15)
RELATIVE NEUTROPHIL (OHS): 73.9 % (ref 38–73)
SODIUM SERPL-SCNC: 138 MMOL/L (ref 136–145)
WBC # BLD AUTO: 8.41 K/UL (ref 3.9–12.7)

## 2025-08-13 PROCEDURE — 25000242 PHARM REV CODE 250 ALT 637 W/ HCPCS: Performed by: INTERNAL MEDICINE

## 2025-08-13 PROCEDURE — 63600175 PHARM REV CODE 636 W HCPCS: Performed by: INTERNAL MEDICINE

## 2025-08-13 PROCEDURE — 99900035 HC TECH TIME PER 15 MIN (STAT)

## 2025-08-13 PROCEDURE — 84100 ASSAY OF PHOSPHORUS: CPT | Performed by: INTERNAL MEDICINE

## 2025-08-13 PROCEDURE — 86038 ANTINUCLEAR ANTIBODIES: CPT | Performed by: INTERNAL MEDICINE

## 2025-08-13 PROCEDURE — 94645 CONT INHLJ TX EACH ADDL HOUR: CPT

## 2025-08-13 PROCEDURE — 83735 ASSAY OF MAGNESIUM: CPT | Performed by: INTERNAL MEDICINE

## 2025-08-13 PROCEDURE — 82247 BILIRUBIN TOTAL: CPT | Performed by: INTERNAL MEDICINE

## 2025-08-13 PROCEDURE — 25000003 PHARM REV CODE 250

## 2025-08-13 PROCEDURE — 36415 COLL VENOUS BLD VENIPUNCTURE: CPT | Performed by: INTERNAL MEDICINE

## 2025-08-13 PROCEDURE — 94640 AIRWAY INHALATION TREATMENT: CPT

## 2025-08-13 PROCEDURE — 85025 COMPLETE CBC W/AUTO DIFF WBC: CPT | Performed by: INTERNAL MEDICINE

## 2025-08-13 PROCEDURE — 94618 PULMONARY STRESS TESTING: CPT

## 2025-08-13 PROCEDURE — 25000003 PHARM REV CODE 250: Performed by: INTERNAL MEDICINE

## 2025-08-13 PROCEDURE — 94761 N-INVAS EAR/PLS OXIMETRY MLT: CPT

## 2025-08-13 PROCEDURE — 86036 ANCA SCREEN EACH ANTIBODY: CPT | Performed by: INTERNAL MEDICINE

## 2025-08-13 PROCEDURE — 27000221 HC OXYGEN, UP TO 24 HOURS

## 2025-08-13 RX ORDER — LEVOFLOXACIN 750 MG/1
750 TABLET, FILM COATED ORAL DAILY
Qty: 5 TABLET | Refills: 0 | Status: SHIPPED | OUTPATIENT
Start: 2025-08-13 | End: 2025-08-18

## 2025-08-13 RX ORDER — HYDROCODONE BITARTRATE AND ACETAMINOPHEN 5; 325 MG/1; MG/1
1 TABLET ORAL EVERY 6 HOURS PRN
Qty: 10 TABLET | Refills: 0 | Status: SHIPPED | OUTPATIENT
Start: 2025-08-13 | End: 2025-08-18

## 2025-08-13 RX ORDER — LEVOFLOXACIN 500 MG/1
500 TABLET, FILM COATED ORAL DAILY
Qty: 5 TABLET | Refills: 0 | Status: SHIPPED | OUTPATIENT
Start: 2025-08-13 | End: 2025-08-13 | Stop reason: HOSPADM

## 2025-08-13 RX ORDER — PANTOPRAZOLE SODIUM 40 MG/1
40 TABLET, DELAYED RELEASE ORAL 2 TIMES DAILY
Qty: 60 TABLET | Refills: 0 | Status: SHIPPED | OUTPATIENT
Start: 2025-08-13 | End: 2026-08-13

## 2025-08-13 RX ORDER — PREDNISONE 10 MG/1
TABLET ORAL
Qty: 19 TABLET | Refills: 0 | Status: SHIPPED | OUTPATIENT
Start: 2025-08-14 | End: 2025-08-25

## 2025-08-13 RX ORDER — MULTIVITAMIN/IRON/FOLIC ACID 18MG-0.4MG
1 TABLET ORAL DAILY
Qty: 30 TABLET | Refills: 0 | Status: SHIPPED | OUTPATIENT
Start: 2025-08-14

## 2025-08-13 RX ADMIN — MUPIROCIN: 20 OINTMENT TOPICAL at 09:08

## 2025-08-13 RX ADMIN — HYDROCODONE BITARTRATE AND ACETAMINOPHEN 1 TABLET: 5; 325 TABLET ORAL at 07:08

## 2025-08-13 RX ADMIN — IPRATROPIUM BROMIDE AND ALBUTEROL SULFATE 3 ML: 2.5; .5 SOLUTION RESPIRATORY (INHALATION) at 07:08

## 2025-08-13 RX ADMIN — PREDNISONE 40 MG: 20 TABLET ORAL at 09:08

## 2025-08-13 RX ADMIN — BUDESONIDE INHALATION 0.5 MG: 0.5 SUSPENSION RESPIRATORY (INHALATION) at 07:08

## 2025-08-13 RX ADMIN — PANTOPRAZOLE SODIUM 40 MG: 40 INJECTION, POWDER, FOR SOLUTION INTRAVENOUS at 09:08

## 2025-08-13 RX ADMIN — THERA TABS 1 TABLET: TAB at 09:08

## 2025-08-14 ENCOUNTER — TELEPHONE (OUTPATIENT)
Dept: HOME HEALTH SERVICES | Facility: CLINIC | Age: 65
End: 2025-08-14
Payer: MEDICARE

## 2025-08-14 LAB
ANA (OHS): NORMAL
ESTROGEN SERPL-MCNC: NORMAL PG/ML
INSULIN SERPL-ACNC: NORMAL U[IU]/ML
LAB AP CLINICAL INFORMATION: NORMAL
LAB AP GROSS DESCRIPTION: NORMAL
LAB AP PERFORMING LOCATION(S): NORMAL
LAB AP REPORT FOOTNOTES: NORMAL
T3RU NFR SERPL: NORMAL %

## 2025-08-15 ENCOUNTER — OFFICE VISIT (OUTPATIENT)
Dept: HOME HEALTH SERVICES | Facility: CLINIC | Age: 65
End: 2025-08-15
Payer: MEDICARE

## 2025-08-15 VITALS
SYSTOLIC BLOOD PRESSURE: 116 MMHG | DIASTOLIC BLOOD PRESSURE: 58 MMHG | OXYGEN SATURATION: 97 % | HEART RATE: 82 BPM | RESPIRATION RATE: 18 BRPM

## 2025-08-15 DIAGNOSIS — D64.9 SYMPTOMATIC ANEMIA: ICD-10-CM

## 2025-08-15 DIAGNOSIS — Z09 HOSPITAL DISCHARGE FOLLOW-UP: Primary | ICD-10-CM

## 2025-08-15 DIAGNOSIS — J18.9 PNEUMONIA OF LEFT LOWER LOBE DUE TO INFECTIOUS ORGANISM: ICD-10-CM

## 2025-08-15 DIAGNOSIS — R04.2 HEMOPTYSIS: ICD-10-CM

## 2025-08-15 LAB
BACTERIA BLD CULT: NORMAL
BACTERIA BLD CULT: NORMAL

## 2025-08-15 PROCEDURE — 3044F HG A1C LEVEL LT 7.0%: CPT | Mod: CPTII,S$GLB,,

## 2025-08-15 PROCEDURE — 3074F SYST BP LT 130 MM HG: CPT | Mod: CPTII,S$GLB,,

## 2025-08-15 PROCEDURE — 1159F MED LIST DOCD IN RCRD: CPT | Mod: CPTII,S$GLB,,

## 2025-08-15 PROCEDURE — 99349 HOME/RES VST EST MOD MDM 40: CPT | Mod: S$GLB,,,

## 2025-08-15 PROCEDURE — 1160F RVW MEDS BY RX/DR IN RCRD: CPT | Mod: CPTII,S$GLB,,

## 2025-08-15 PROCEDURE — 1111F DSCHRG MED/CURRENT MED MERGE: CPT | Mod: CPTII,S$GLB,,

## 2025-08-15 PROCEDURE — 3078F DIAST BP <80 MM HG: CPT | Mod: CPTII,S$GLB,,

## 2025-08-16 ENCOUNTER — TELEPHONE (OUTPATIENT)
Dept: ADMINISTRATIVE | Facility: CLINIC | Age: 65
End: 2025-08-16
Payer: MEDICARE

## 2025-08-18 ENCOUNTER — TELEPHONE (OUTPATIENT)
Dept: INTERNAL MEDICINE | Facility: CLINIC | Age: 65
End: 2025-08-18

## 2025-08-18 ENCOUNTER — OFFICE VISIT (OUTPATIENT)
Dept: INTERNAL MEDICINE | Facility: CLINIC | Age: 65
End: 2025-08-18
Payer: MEDICARE

## 2025-08-18 VITALS
TEMPERATURE: 96 F | DIASTOLIC BLOOD PRESSURE: 60 MMHG | SYSTOLIC BLOOD PRESSURE: 122 MMHG | BODY MASS INDEX: 17.58 KG/M2 | OXYGEN SATURATION: 98 % | HEIGHT: 70 IN | HEART RATE: 80 BPM | RESPIRATION RATE: 21 BRPM | WEIGHT: 122.81 LBS

## 2025-08-18 DIAGNOSIS — D64.9 SYMPTOMATIC ANEMIA: ICD-10-CM

## 2025-08-18 DIAGNOSIS — R04.2 HEMOPTYSIS: ICD-10-CM

## 2025-08-18 DIAGNOSIS — J18.9 PNEUMONIA OF LEFT LOWER LOBE DUE TO INFECTIOUS ORGANISM: Primary | ICD-10-CM

## 2025-08-18 LAB
W C-ANCA: NORMAL TITER
W P-ANCA: NORMAL TITER

## 2025-08-18 PROCEDURE — 3078F DIAST BP <80 MM HG: CPT | Mod: CPTII,S$GLB,, | Performed by: PHYSICIAN ASSISTANT

## 2025-08-18 PROCEDURE — 3044F HG A1C LEVEL LT 7.0%: CPT | Mod: CPTII,S$GLB,, | Performed by: PHYSICIAN ASSISTANT

## 2025-08-18 PROCEDURE — 3074F SYST BP LT 130 MM HG: CPT | Mod: CPTII,S$GLB,, | Performed by: PHYSICIAN ASSISTANT

## 2025-08-18 PROCEDURE — 1159F MED LIST DOCD IN RCRD: CPT | Mod: CPTII,S$GLB,, | Performed by: PHYSICIAN ASSISTANT

## 2025-08-18 PROCEDURE — 3288F FALL RISK ASSESSMENT DOCD: CPT | Mod: CPTII,S$GLB,, | Performed by: PHYSICIAN ASSISTANT

## 2025-08-18 PROCEDURE — 99213 OFFICE O/P EST LOW 20 MIN: CPT | Mod: S$GLB,,, | Performed by: PHYSICIAN ASSISTANT

## 2025-08-18 PROCEDURE — 3008F BODY MASS INDEX DOCD: CPT | Mod: CPTII,S$GLB,, | Performed by: PHYSICIAN ASSISTANT

## 2025-08-18 PROCEDURE — 1111F DSCHRG MED/CURRENT MED MERGE: CPT | Mod: CPTII,S$GLB,, | Performed by: PHYSICIAN ASSISTANT

## 2025-08-18 PROCEDURE — 1125F AMNT PAIN NOTED PAIN PRSNT: CPT | Mod: CPTII,S$GLB,, | Performed by: PHYSICIAN ASSISTANT

## 2025-08-18 PROCEDURE — 99999 PR PBB SHADOW E&M-EST. PATIENT-LVL IV: CPT | Mod: PBBFAC,,, | Performed by: PHYSICIAN ASSISTANT

## 2025-08-18 PROCEDURE — 1160F RVW MEDS BY RX/DR IN RCRD: CPT | Mod: CPTII,S$GLB,, | Performed by: PHYSICIAN ASSISTANT

## 2025-08-18 PROCEDURE — 1101F PT FALLS ASSESS-DOCD LE1/YR: CPT | Mod: CPTII,S$GLB,, | Performed by: PHYSICIAN ASSISTANT

## 2025-08-22 ENCOUNTER — TELEPHONE (OUTPATIENT)
Dept: ADMINISTRATIVE | Facility: CLINIC | Age: 65
End: 2025-08-22
Payer: MEDICARE

## 2025-08-22 ENCOUNTER — PATIENT OUTREACH (OUTPATIENT)
Dept: ADMINISTRATIVE | Facility: OTHER | Age: 65
End: 2025-08-22
Payer: MEDICARE

## 2025-08-28 ENCOUNTER — OFFICE VISIT (OUTPATIENT)
Dept: PULMONOLOGY | Facility: CLINIC | Age: 65
End: 2025-08-28
Payer: MEDICARE

## 2025-08-28 ENCOUNTER — HOSPITAL ENCOUNTER (OUTPATIENT)
Dept: RADIOLOGY | Facility: HOSPITAL | Age: 65
Discharge: HOME | End: 2025-08-28
Attending: INTERNAL MEDICINE
Payer: MEDICARE

## 2025-08-28 VITALS
RESPIRATION RATE: 20 BRPM | SYSTOLIC BLOOD PRESSURE: 108 MMHG | HEIGHT: 70 IN | BODY MASS INDEX: 17.73 KG/M2 | OXYGEN SATURATION: 95 % | DIASTOLIC BLOOD PRESSURE: 62 MMHG | WEIGHT: 123.88 LBS | HEART RATE: 60 BPM

## 2025-08-28 DIAGNOSIS — R04.2 HEMOPTYSIS: Primary | ICD-10-CM

## 2025-08-28 DIAGNOSIS — J47.9 ADULT BRONCHIECTASIS: Chronic | ICD-10-CM

## 2025-08-28 DIAGNOSIS — J84.10 PULMONARY FIBROSIS, POSTINFLAMMATORY: Chronic | ICD-10-CM

## 2025-08-28 DIAGNOSIS — J18.0 DIFFUSE PNEUMONIA: ICD-10-CM

## 2025-08-28 PROCEDURE — 3074F SYST BP LT 130 MM HG: CPT | Mod: CPTII,S$GLB,, | Performed by: INTERNAL MEDICINE

## 2025-08-28 PROCEDURE — 3078F DIAST BP <80 MM HG: CPT | Mod: CPTII,S$GLB,, | Performed by: INTERNAL MEDICINE

## 2025-08-28 PROCEDURE — 1111F DSCHRG MED/CURRENT MED MERGE: CPT | Mod: CPTII,S$GLB,, | Performed by: INTERNAL MEDICINE

## 2025-08-28 PROCEDURE — 99999 PR PBB SHADOW E&M-EST. PATIENT-LVL IV: CPT | Mod: PBBFAC,,, | Performed by: INTERNAL MEDICINE

## 2025-08-28 PROCEDURE — 3044F HG A1C LEVEL LT 7.0%: CPT | Mod: CPTII,S$GLB,, | Performed by: INTERNAL MEDICINE

## 2025-08-28 PROCEDURE — 1125F AMNT PAIN NOTED PAIN PRSNT: CPT | Mod: CPTII,S$GLB,, | Performed by: INTERNAL MEDICINE

## 2025-08-28 PROCEDURE — 3008F BODY MASS INDEX DOCD: CPT | Mod: CPTII,S$GLB,, | Performed by: INTERNAL MEDICINE

## 2025-08-28 PROCEDURE — 1159F MED LIST DOCD IN RCRD: CPT | Mod: CPTII,S$GLB,, | Performed by: INTERNAL MEDICINE

## 2025-08-28 PROCEDURE — 3288F FALL RISK ASSESSMENT DOCD: CPT | Mod: CPTII,S$GLB,, | Performed by: INTERNAL MEDICINE

## 2025-08-28 PROCEDURE — 1101F PT FALLS ASSESS-DOCD LE1/YR: CPT | Mod: CPTII,S$GLB,, | Performed by: INTERNAL MEDICINE

## 2025-08-28 PROCEDURE — 99215 OFFICE O/P EST HI 40 MIN: CPT | Mod: S$GLB,,, | Performed by: INTERNAL MEDICINE

## 2025-08-28 PROCEDURE — 71250 CT THORAX DX C-: CPT | Mod: TC

## 2025-08-28 PROCEDURE — 71250 CT THORAX DX C-: CPT | Mod: 26,,, | Performed by: RADIOLOGY

## 2025-08-28 PROCEDURE — 1160F RVW MEDS BY RX/DR IN RCRD: CPT | Mod: CPTII,S$GLB,, | Performed by: INTERNAL MEDICINE

## 2025-08-29 ENCOUNTER — TELEPHONE (OUTPATIENT)
Dept: CARDIOLOGY | Facility: CLINIC | Age: 65
End: 2025-08-29
Payer: MEDICARE

## 2025-09-03 ENCOUNTER — TELEPHONE (OUTPATIENT)
Dept: SMOKING CESSATION | Facility: CLINIC | Age: 65
End: 2025-09-03
Payer: MEDICARE

## 2025-09-05 PROBLEM — F19.10 SUBSTANCE ABUSE: Status: ACTIVE | Noted: 2025-09-05
